# Patient Record
Sex: MALE | Race: WHITE | NOT HISPANIC OR LATINO | Employment: OTHER | URBAN - METROPOLITAN AREA
[De-identification: names, ages, dates, MRNs, and addresses within clinical notes are randomized per-mention and may not be internally consistent; named-entity substitution may affect disease eponyms.]

---

## 2023-02-20 LAB
ALANINE AMINOTRANSFERASE (SGPT) (U/L) IN SER/PLAS: 19 U/L (ref 10–52)
ALBUMIN (G/DL) IN SER/PLAS: 4.2 G/DL (ref 3.4–5)
ALKALINE PHOSPHATASE (U/L) IN SER/PLAS: 92 U/L (ref 33–136)
ANION GAP IN SER/PLAS: 14 MMOL/L (ref 10–20)
ASPARTATE AMINOTRANSFERASE (SGOT) (U/L) IN SER/PLAS: 25 U/L (ref 9–39)
BILIRUBIN TOTAL (MG/DL) IN SER/PLAS: 1.3 MG/DL (ref 0–1.2)
CALCIUM (MG/DL) IN SER/PLAS: 10.4 MG/DL (ref 8.6–10.6)
CARBON DIOXIDE, TOTAL (MMOL/L) IN SER/PLAS: 25 MMOL/L (ref 21–32)
CHLORIDE (MMOL/L) IN SER/PLAS: 110 MMOL/L (ref 98–107)
CREATININE (MG/DL) IN SER/PLAS: 1.31 MG/DL (ref 0.5–1.3)
GFR MALE: 54 ML/MIN/1.73M2
GLUCOSE (MG/DL) IN SER/PLAS: 88 MG/DL (ref 74–99)
POTASSIUM (MMOL/L) IN SER/PLAS: 5.3 MMOL/L (ref 3.5–5.3)
PROTEIN TOTAL: 6.7 G/DL (ref 6.4–8.2)
SODIUM (MMOL/L) IN SER/PLAS: 144 MMOL/L (ref 136–145)
UREA NITROGEN (MG/DL) IN SER/PLAS: 39 MG/DL (ref 6–23)

## 2023-09-04 PROBLEM — M81.0 OSTEOPOROSIS: Status: ACTIVE | Noted: 2023-09-04

## 2023-09-04 PROBLEM — K86.89: Status: ACTIVE | Noted: 2023-09-04

## 2023-09-04 PROBLEM — E78.5 DYSLIPIDEMIA: Status: ACTIVE | Noted: 2023-09-04

## 2023-09-04 PROBLEM — R35.0 BENIGN PROSTATIC HYPERPLASIA WITH URINARY FREQUENCY: Status: ACTIVE | Noted: 2023-09-04

## 2023-09-04 PROBLEM — R60.0 BILATERAL LEG EDEMA: Status: ACTIVE | Noted: 2023-09-04

## 2023-09-04 PROBLEM — I35.0 NONRHEUMATIC AORTIC VALVE STENOSIS: Status: ACTIVE | Noted: 2023-09-04

## 2023-09-04 PROBLEM — I49.3 PVC (PREMATURE VENTRICULAR CONTRACTION): Status: ACTIVE | Noted: 2023-09-04

## 2023-09-04 PROBLEM — K83.1 BILE DUCT STRICTURE (CMS-HCC): Status: ACTIVE | Noted: 2023-09-04

## 2023-09-04 PROBLEM — M65.30 TRIGGER FINGER, RIGHT: Status: ACTIVE | Noted: 2023-09-04

## 2023-09-04 PROBLEM — D64.9 ANEMIA: Status: ACTIVE | Noted: 2023-09-04

## 2023-09-04 PROBLEM — N40.0 BPH (BENIGN PROSTATIC HYPERPLASIA): Status: ACTIVE | Noted: 2023-09-04

## 2023-09-04 PROBLEM — I49.8 SINUS ARRHYTHMIA: Status: ACTIVE | Noted: 2023-09-04

## 2023-09-04 PROBLEM — R63.4 WEIGHT LOSS: Status: ACTIVE | Noted: 2023-09-04

## 2023-09-04 PROBLEM — D53.1 MEGALOBLASTIC ANEMIA: Status: ACTIVE | Noted: 2023-09-04

## 2023-09-04 PROBLEM — L98.9 SKIN LESION: Status: ACTIVE | Noted: 2023-09-04

## 2023-09-04 PROBLEM — F32.A DEPRESSION: Status: ACTIVE | Noted: 2023-09-04

## 2023-09-04 PROBLEM — N18.2 CHRONIC RENAL IMPAIRMENT, STAGE 2 (MILD): Status: ACTIVE | Noted: 2023-09-04

## 2023-09-04 PROBLEM — Z87.19 HISTORY OF GI BLEED: Status: ACTIVE | Noted: 2023-09-04

## 2023-09-04 PROBLEM — M70.61 GREATER TROCHANTERIC BURSITIS, RIGHT: Status: ACTIVE | Noted: 2023-09-04

## 2023-09-04 PROBLEM — K83.9 BILE LEAK: Status: ACTIVE | Noted: 2023-09-04

## 2023-09-04 PROBLEM — K80.50 CALCULUS OF COMMON BILE DUCT WITH ACUTE PANCREATITIS (HHS-HCC): Status: ACTIVE | Noted: 2023-09-04

## 2023-09-04 PROBLEM — I35.8 AORTIC VALVE SCLEROSIS: Status: ACTIVE | Noted: 2023-09-04

## 2023-09-04 PROBLEM — R97.20 ABNORMAL PROSTATE SPECIFIC ANTIGEN (PSA): Status: ACTIVE | Noted: 2023-09-04

## 2023-09-04 PROBLEM — M41.9 KYPHOSCOLIOSIS: Status: ACTIVE | Noted: 2023-09-04

## 2023-09-04 PROBLEM — K85.90 CALCULUS OF COMMON BILE DUCT WITH ACUTE PANCREATITIS (HHS-HCC): Status: ACTIVE | Noted: 2023-09-04

## 2023-09-04 PROBLEM — I10 BENIGN ESSENTIAL HYPERTENSION: Status: ACTIVE | Noted: 2023-09-04

## 2023-09-04 PROBLEM — E03.9 HYPOTHYROIDISM: Status: ACTIVE | Noted: 2023-09-04

## 2023-09-04 PROBLEM — L02.11 ABSCESS OF SKIN OF NECK: Status: ACTIVE | Noted: 2023-09-04

## 2023-09-04 PROBLEM — N40.1 BENIGN PROSTATIC HYPERPLASIA WITH URINARY FREQUENCY: Status: ACTIVE | Noted: 2023-09-04

## 2023-09-04 RX ORDER — DESONIDE 0.5 MG/G
CREAM TOPICAL
COMMUNITY
Start: 2013-09-30

## 2023-09-04 RX ORDER — AZILSARTAN KAMEDOXOMIL 40 MG/1
1 TABLET ORAL DAILY
COMMUNITY
Start: 2017-05-09 | End: 2023-11-28

## 2023-09-04 RX ORDER — LEVOTHYROXINE SODIUM 88 UG/1
1 TABLET ORAL DAILY
COMMUNITY
Start: 2013-12-02 | End: 2023-10-26

## 2023-09-04 RX ORDER — METOPROLOL SUCCINATE 50 MG/1
1 TABLET, EXTENDED RELEASE ORAL DAILY
COMMUNITY
Start: 2020-01-17 | End: 2023-10-26

## 2023-09-04 RX ORDER — TAMSULOSIN HYDROCHLORIDE 0.4 MG/1
1 CAPSULE ORAL DAILY
COMMUNITY
Start: 2021-07-21 | End: 2023-11-22

## 2023-09-04 RX ORDER — PANCRELIPASE 36000; 180000; 114000 [USP'U]/1; [USP'U]/1; [USP'U]/1
1 CAPSULE, DELAYED RELEASE PELLETS ORAL 3 TIMES DAILY
COMMUNITY
Start: 2017-02-09

## 2023-09-04 RX ORDER — HYDROCHLOROTHIAZIDE 12.5 MG/1
12.5 TABLET ORAL DAILY
COMMUNITY

## 2023-09-04 RX ORDER — ATORVASTATIN CALCIUM 10 MG/1
1 TABLET, FILM COATED ORAL DAILY
COMMUNITY
Start: 2013-12-13 | End: 2023-10-26

## 2023-09-04 RX ORDER — PANCRELIPASE LIPASE, PANCRELIPASE PROTEASE, PANCRELIPASE AMYLASE 40000; 126000; 168000 [USP'U]/1; [USP'U]/1; [USP'U]/1
1 CAPSULE, DELAYED RELEASE ORAL 3 TIMES DAILY
COMMUNITY
Start: 2017-07-22

## 2023-09-04 RX ORDER — DENOSUMAB 60 MG/ML
60 INJECTION SUBCUTANEOUS
COMMUNITY
Start: 2019-07-10

## 2023-09-04 RX ORDER — CHLORHEXIDINE GLUCONATE ORAL RINSE 1.2 MG/ML
SOLUTION DENTAL
COMMUNITY
Start: 2021-05-11

## 2023-09-04 RX ORDER — FLUOROURACIL 50 MG/G
CREAM TOPICAL
COMMUNITY
Start: 2013-09-13

## 2023-09-11 PROBLEM — H31.003 CHORIORETINAL SCAR OF BOTH EYES: Status: ACTIVE | Noted: 2020-09-17

## 2023-09-11 PROBLEM — I10 ESSENTIAL HYPERTENSION: Status: ACTIVE | Noted: 2017-08-23

## 2023-09-11 PROBLEM — H52.4 PRESBYOPIA: Status: ACTIVE | Noted: 2020-03-17

## 2023-09-11 PROBLEM — K86.89 PANCREATIC INSUFFICIENCY (HHS-HCC): Status: ACTIVE | Noted: 2017-08-23

## 2023-09-11 PROBLEM — Z96.1 S/P LENS IMPLANT: Status: ACTIVE | Noted: 2019-01-15

## 2023-09-11 PROBLEM — H25.12 NUCLEAR SCLEROTIC CATARACT, LEFT: Status: ACTIVE | Noted: 2019-03-14

## 2023-09-11 PROBLEM — H35.3132 INTERMEDIATE STAGE NONEXUDATIVE AGE-RELATED MACULAR DEGENERATION OF BOTH EYES: Status: ACTIVE | Noted: 2019-01-15

## 2023-09-11 PROBLEM — E78.5 HYPERLIPIDEMIA: Status: ACTIVE | Noted: 2017-08-23

## 2023-09-14 LAB
ALANINE AMINOTRANSFERASE (SGPT) (U/L) IN SER/PLAS: 23 U/L (ref 10–52)
ALBUMIN (G/DL) IN SER/PLAS: 4 G/DL (ref 3.4–5)
ALKALINE PHOSPHATASE (U/L) IN SER/PLAS: 91 U/L (ref 33–136)
ANION GAP IN SER/PLAS: 12 MMOL/L (ref 10–20)
ASPARTATE AMINOTRANSFERASE (SGOT) (U/L) IN SER/PLAS: 23 U/L (ref 9–39)
BILIRUBIN TOTAL (MG/DL) IN SER/PLAS: 1.1 MG/DL (ref 0–1.2)
CALCIUM (MG/DL) IN SER/PLAS: 10.1 MG/DL (ref 8.6–10.6)
CARBON DIOXIDE, TOTAL (MMOL/L) IN SER/PLAS: 28 MMOL/L (ref 21–32)
CHLORIDE (MMOL/L) IN SER/PLAS: 108 MMOL/L (ref 98–107)
CREATININE (MG/DL) IN SER/PLAS: 1.06 MG/DL (ref 0.5–1.3)
GFR MALE: 69 ML/MIN/1.73M2
GLUCOSE (MG/DL) IN SER/PLAS: 65 MG/DL (ref 74–99)
POTASSIUM (MMOL/L) IN SER/PLAS: 4.4 MMOL/L (ref 3.5–5.3)
PROTEIN TOTAL: 6.9 G/DL (ref 6.4–8.2)
SODIUM (MMOL/L) IN SER/PLAS: 144 MMOL/L (ref 136–145)
UREA NITROGEN (MG/DL) IN SER/PLAS: 34 MG/DL (ref 6–23)

## 2023-10-03 ENCOUNTER — TELEPHONE (OUTPATIENT)
Dept: PRIMARY CARE | Facility: CLINIC | Age: 83
End: 2023-10-03
Payer: MEDICARE

## 2023-10-06 NOTE — TELEPHONE ENCOUNTER
Spoke with pt. Let him know that the RSV vaccine is up to him, the doctor is not for or against the vaccine at this time.

## 2023-10-10 ENCOUNTER — OFFICE VISIT (OUTPATIENT)
Dept: RHEUMATOLOGY | Facility: CLINIC | Age: 83
End: 2023-10-10
Payer: MEDICARE

## 2023-10-10 VITALS
BODY MASS INDEX: 16.49 KG/M2 | DIASTOLIC BLOOD PRESSURE: 62 MMHG | HEART RATE: 63 BPM | TEMPERATURE: 96.8 F | WEIGHT: 115.2 LBS | HEIGHT: 70 IN | SYSTOLIC BLOOD PRESSURE: 131 MMHG

## 2023-10-10 DIAGNOSIS — M81.0 OSTEOPOROSIS WITHOUT CURRENT PATHOLOGICAL FRACTURE, UNSPECIFIED OSTEOPOROSIS TYPE: Primary | ICD-10-CM

## 2023-10-10 PROCEDURE — 99213 OFFICE O/P EST LOW 20 MIN: CPT | Performed by: INTERNAL MEDICINE

## 2023-10-10 PROCEDURE — 1159F MED LIST DOCD IN RCRD: CPT | Performed by: INTERNAL MEDICINE

## 2023-10-10 PROCEDURE — 1126F AMNT PAIN NOTED NONE PRSNT: CPT | Performed by: INTERNAL MEDICINE

## 2023-10-10 PROCEDURE — 3075F SYST BP GE 130 - 139MM HG: CPT | Performed by: INTERNAL MEDICINE

## 2023-10-10 PROCEDURE — 99213 OFFICE O/P EST LOW 20 MIN: CPT | Mod: PO | Performed by: INTERNAL MEDICINE

## 2023-10-10 PROCEDURE — 3078F DIAST BP <80 MM HG: CPT | Performed by: INTERNAL MEDICINE

## 2023-10-10 ASSESSMENT — PAIN SCALES - GENERAL: PAINLEVEL: 0-NO PAIN

## 2023-10-10 NOTE — PROGRESS NOTES
YAMILETH Franks is a 83 y.o. male  history of hypothyroidism, pancreatic insufficiency, macrocytic anemia, BPH, hypertension, osteopenia on Prolia injections every 6 months who presents for osteoporosis follow up     He has not fallen. He has not had any recent fractures. He continues to take Prolia injections every 6 months. He has been taking calcium and vitamin D supplements.     He has some mild bursitis and lateral aspect of the right hip but otherwise has been feeling fine. No joint pain or swelling. No morning stiffness.     Exercises three times a week, aerobics and treadmill and a little bit with the weight machines.     He has not been losing any more weight since taking Creon for pancreatic insufficiency but hasn't gained any either.    Was hospitalized in 06/2023 for acute pancreatitis with dilated pancreatic ducts. ERCP was performed and he has a biliary drain. No fever, chills, weight loss. No nausea, vomiting, abdominal pain, constipation, diarrhea, melena or hematochezia    Patient Active Problem List   Diagnosis    Abnormal prostate specific antigen (PSA)    Abscess of skin of neck    Anemia    Aortic valve sclerosis    Atrophy, pancreas    Benign essential hypertension    BPH (benign prostatic hyperplasia)    Depression    Osteoporosis    Dyslipidemia    Benign prostatic hyperplasia with urinary frequency    Greater trochanteric bursitis, right    Hypothyroidism    Kyphoscoliosis    Megaloblastic anemia    Nonrheumatic aortic valve stenosis    PVC (premature ventricular contraction)    Sinus arrhythmia    Skin lesion    Trigger finger, right    Weight loss    Bilateral leg edema    Bile duct stricture    Bile leak    Calculus of common bile duct with acute pancreatitis    Chronic renal impairment, stage 2 (mild)    History of GI bleed    Chorioretinal scar of both eyes    Conjunctival cyst of left eye    Conjunctival hemorrhage of left eye    Hyperlipidemia    Intermediate stage nonexudative  age-related macular degeneration of both eyes    Nuclear sclerotic cataract, left    Posterior subcapsular polar senile cataract    Presbyopia    S/P lens implant    Pancreatic insufficiency    Essential hypertension    Retinal vascular occlusion       Past Medical History:   Diagnosis Date    Epigastric pain 12/28/2016    Abdominal pain, epigastric    Gastrojejunal ulcer, unspecified as acute or chronic, without hemorrhage or perforation     Jejunal ulcer    Gilbert syndrome     Gilbert syndrome    Iron deficiency anemia secondary to blood loss (chronic)     Iron deficiency anemia due to chronic blood loss    Iron deficiency anemia secondary to blood loss (chronic) 12/27/2016    Anemia due to blood loss    Other long term (current) drug therapy 06/17/2016    High risk medication use    Personal history of (healed) traumatic fracture     History of fracture of left hip    Tinnitus, right ear 06/17/2016    Tinnitus of right ear       Past Surgical History:   Procedure Laterality Date    CHOLECYSTECTOMY  04/15/2014    Cholecystectomy    COLONOSCOPY  06/09/2015    Complete Colonoscopy    OTHER SURGICAL HISTORY  04/15/2014    Partial Gastrectomy [place] II    US GUIDED ABSCESS DRAIN  7/6/2023    US GUIDED ABSCESS DRAIN 7/6/2023 [facility]       Social History     Socioeconomic History    Marital status: Single     Spouse name: Not on file    Number of children: Not on file    Years of education: Not on file    Highest education level: Not on file   Occupational History    Not on file   Tobacco Use    Smoking status: Not on file    Smokeless tobacco: Not on file   Substance and Sexual Activity    Alcohol use: Not on file    Drug use: Not on file    Sexual activity: Not on file   Other Topics Concern    Not on file   Social History Narrative    Not on file     Social Determinants of Health     Financial Resource Strain: Not on file   Food Insecurity: Not on file   Transportation Needs: Not on file   Physical Activity: Not on  file   Stress: Not on file   Social Connections: Not on file   Intimate Partner Violence: Not on file   Housing Stability: Not on file       No Known Allergies      Current Outpatient Medications:     atorvastatin (Lipitor) 10 mg tablet, Take 1 tablet (10 mg) by mouth once daily., Disp: , Rfl:     calcium citrate/vitamin D3 (CITRACAL REGULAR ORAL), Take by mouth., Disp: , Rfl:     chlorhexidine (Peridex) 0.12 % solution, Use in the mouth or throat. AFTER BREAKFAST AND BEFORE BEDTIME FOR 1 WEEK, Disp: , Rfl:     denosumab (Prolia) 60 mg/mL syringe, Inject 1 mL (60 mg) under the skin every 6 months., Disp: , Rfl:     desonide (DesOwen) 0.05 % cream, Desonide 0.05 % External Cream  Quantity: 60  Refills: 0      Start : 30-Sep-2013  Active, Disp: , Rfl:     Edarbi 40 mg tablet, Take 1 tablet by mouth once daily., Disp: , Rfl:     fluorouracil (Efudex) 5 % cream, Fluorouracil 5 % External Cream  Quantity: 40  Refills: 0      Start : 13-Sep-2013  Active, Disp: , Rfl:     hydroCHLOROthiazide (HYDRODiuril) 12.5 mg tablet, Take 1 tablet (12.5 mg) by mouth once daily., Disp: , Rfl:     levothyroxine (Synthroid, Levoxyl) 88 mcg tablet, Take 1 tablet (88 mcg) by mouth once daily., Disp: , Rfl:     lipase-protease-amylase (Zenpep) 40,000-126,000- 168,000 unit capsule, Take 1 capsule by mouth 3 times a day., Disp: , Rfl:     metoprolol succinate XL (Toprol-XL) 50 mg 24 hr tablet, Take 1 tablet (50 mg) by mouth once daily., Disp: , Rfl:     pancrelipase, Lip-Prot-Amyl, (Creon) 36,000-114,000- 180,000 unit capsule,delayed release(DR/EC) capsule, Take 1 capsule by mouth 3 times a day., Disp: , Rfl:     tamsulosin (Flomax) 0.4 mg 24 hr capsule, Take 1 capsule (0.4 mg) by mouth once daily., Disp: , Rfl:     Objective     Visit Vitals  /62 (BP Location: Left arm, Patient Position: Sitting)   Pulse 63   Temp 36 °C (96.8 °F)     Physical Exam  He has a very thin body habitus. The abdomen benign. Extremities without edema.  Neurologic examination shows a mild apraxia on tandem gait. The Romberg test is normal. The musculoskeletal lamination does not show any joint effusions.     Lab Results   Component Value Date    WBC CANCELED 07/09/2023    HGB CANCELED 07/09/2023    HCT CANCELED 07/09/2023    MCV CANCELED 07/09/2023    PLT CANCELED 07/09/2023       Chemistry    Lab Results   Component Value Date/Time     09/14/2023 1518    K 4.4 09/14/2023 1518     (H) 09/14/2023 1518    CO2 28 09/14/2023 1518    BUN 34 (H) 09/14/2023 1518    CREATININE 1.06 09/14/2023 1518    Lab Results   Component Value Date/Time    CALCIUM 10.1 09/14/2023 1518    ALKPHOS 91 09/14/2023 1518    AST 23 09/14/2023 1518    ALT 23 09/14/2023 1518    BILITOT 1.1 09/14/2023 1518          Lab Results   Component Value Date    CALCIUM 10.1 09/14/2023    PHOS 3.2 02/14/2019     Alkaline Phosphatase   Date Value Ref Range Status   09/14/2023 91 33 - 136 U/L Final     AST   Date Value Ref Range Status   09/14/2023 23 9 - 39 U/L Final     Urea Nitrogen   Date Value Ref Range Status   09/14/2023 34 (H) 6 - 23 mg/dL Final     Sodium   Date Value Ref Range Status   09/14/2023 144 136 - 145 mmol/L Final     Potassium   Date Value Ref Range Status   09/14/2023 4.4 3.5 - 5.3 mmol/L Final     Bicarbonate   Date Value Ref Range Status   09/14/2023 28 21 - 32 mmol/L Final     ALT (SGPT)   Date Value Ref Range Status   09/14/2023 23 10 - 52 U/L Final     Comment:      Patients treated with Sulfasalazine may generate    falsely decreased results for ALT.       Vitamin D, 25-Hydroxy   Date Value Ref Range Status   06/26/2019 43 ng/mL Final     Comment:     .  DEFICIENCY:         < 20  NG/ML  INSUFFICIENCY:      20-29 NG/ML  OPTIMUM LEVEL:      30-80 NG/ML  POSSIBLE TOXICITY:  > 80  NG/ML    THIS ASSAY ACCURATELY QUANTIFIES THE SUM OF  VITAMIN D3, 25-HYDROXY AND VIT D2,25-HYDROXY.           Assessment/Plan   An 83 year old M here for osteopenia with high frax follow up.      DEXA bone density T score....... (3/11/2022)...... (6/6/2019)  L1-L4.............................................. -1.2.................. -1.5  Right femoral neck......................... -2.2.................. -2.3  Right total femur............................. -2.0.................. -2.3    - Labs from 09/2023 reviewed.    - He is to continue with Prolia injections every 6 months (scheduled for 03/2024). He is to do weightbearing exercises and take calcium and vitamin D supplements.   - CMP ordered for 03/2023.   - He already has an order for DEXA for 03/2023, he will call to make an appointment.   - RTC at next available appointment     Aurea Ramon MD  Rheumatology Fellow

## 2023-10-11 ENCOUNTER — TELEPHONE (OUTPATIENT)
Dept: PRIMARY CARE | Facility: CLINIC | Age: 83
End: 2023-10-11

## 2023-10-12 ENCOUNTER — PHARMACY VISIT (OUTPATIENT)
Dept: PHARMACY | Facility: CLINIC | Age: 83
End: 2023-10-12

## 2023-10-12 PROCEDURE — RXMED WILLOW AMBULATORY MEDICATION CHARGE

## 2023-10-12 RX ORDER — SODIUM CHLORIDE 0.9 % (FLUSH) 0.9 %
SYRINGE (ML) INJECTION
Qty: 600 ML | Refills: 1 | OUTPATIENT
Start: 2023-10-12 | End: 2024-03-08 | Stop reason: ALTCHOICE

## 2023-10-12 RX ORDER — SODIUM CHLORIDE 9 MG/ML
INJECTION, SOLUTION INTRAMUSCULAR; INTRAVENOUS; SUBCUTANEOUS
Qty: 600 ML | Refills: 1 | OUTPATIENT
Start: 2023-10-12

## 2023-10-13 ENCOUNTER — TELEPHONE (OUTPATIENT)
Dept: PRIMARY CARE | Facility: CLINIC | Age: 83
End: 2023-10-13
Payer: MEDICARE

## 2023-10-18 ENCOUNTER — TELEMEDICINE (OUTPATIENT)
Dept: GASTROENTEROLOGY | Facility: HOSPITAL | Age: 83
End: 2023-10-18
Payer: MEDICARE

## 2023-10-18 DIAGNOSIS — K83.1 BILE DUCT STRICTURE (CMS-HCC): Primary | ICD-10-CM

## 2023-10-18 PROCEDURE — 99214 OFFICE O/P EST MOD 30 MIN: CPT | Performed by: INTERNAL MEDICINE

## 2023-10-18 PROCEDURE — 99214 OFFICE O/P EST MOD 30 MIN: CPT | Mod: 95 | Performed by: INTERNAL MEDICINE

## 2023-10-18 NOTE — PROGRESS NOTES
"Department of Gastroenterology and Hepatology   Gastroenterology Clinic Progress Note     Subjective  83 year old with pertinent PMH of PUD s/p surgery in 1984, cholecystectomy, jaundice in 1989 s/p \"another surgery,\" chronic pancreatitis with biliary stricture and pancreatic insufficiency on pancreatic enzymes who was recently admitted to [facility] beginning of July this year for acute pancreatitis with abdominal pain and elevated lipase > 2000, elevated wbc, and slightly elevated bili. All resolved spontaneously. Since then he is doing well.    At [facility] we first tried ERCP. He had B2 anatomy but failed bile duct cannulation. This was followed by PTHC and wire. Repeat ERCP failed to see wire in ampulla. F/U PTHC shows that he had choledochojej. He still has PTHC drain.       LABS:  Lab Results   Component Value Date    WBC CANCELED 07/09/2023    WBC 10.8 07/08/2023    WBC 11.6 (H) 07/07/2023    WBC 9.6 07/06/2023    WBC 7.9 07/04/2023    HGB CANCELED 07/09/2023    HGB 9.4 (L) 07/08/2023    HGB 9.6 (L) 07/07/2023    HGB 9.3 (L) 07/06/2023    HGB 9.5 (L) 07/04/2023    HCT CANCELED 07/09/2023    HCT 28.9 (L) 07/08/2023    HCT 29.8 (L) 07/07/2023    HCT 28.5 (L) 07/06/2023    HCT 29.9 (L) 07/04/2023    MCV CANCELED 07/09/2023    MCV 94 07/08/2023    MCV 95 07/07/2023    MCV 94 07/06/2023    MCV 96 07/04/2023    PLT CANCELED 07/09/2023     07/08/2023     07/07/2023     07/06/2023     07/04/2023      Lab Results   Component Value Date    CREATININE 1.06 09/14/2023    CREATININE CANCELED 07/09/2023    CREATININE 1.58 (H) 07/08/2023    BUN 34 (H) 09/14/2023    BUN CANCELED 07/09/2023    BUN 23 07/08/2023     09/14/2023    NA CANCELED 07/09/2023     07/08/2023    K 4.4 09/14/2023    K CANCELED 07/09/2023    K 3.3 (L) 07/08/2023     (H) 09/14/2023    CL CANCELED 07/09/2023     (H) 07/08/2023    CO2 28 09/14/2023    CO2 CANCELED 07/09/2023    CO2 22 07/08/2023      Lab Results "   Component Value Date    INR 1.1 07/05/2023    PROTIME 12.4 07/05/2023      Lab Results   Component Value Date    ALT 23 09/14/2023    ALT CANCELED 07/09/2023    ALT 20 07/08/2023    AST 23 09/14/2023    AST CANCELED 07/09/2023    AST 22 07/08/2023    ALKPHOS 91 09/14/2023    ALKPHOS CANCELED 07/09/2023    ALKPHOS 56 07/08/2023    BILITOT 1.1 09/14/2023    BILITOT CANCELED 07/09/2023    BILITOT 0.6 07/08/2023       A&P:   Complicated history. Suspect he had B2 in '84 followed by [facility] choledochojej. Current [facility] is in choledochojej. However, his episode in [place] must have been GS pancreatitis through native bile duct and papolla. Ideally would do sphincterotomy but don't know if that is possible.     Repeat ERCP     Staffed with Dr. Gaudencio Souza MD

## 2023-11-22 DIAGNOSIS — R35.0 FREQUENCY OF MICTURITION: ICD-10-CM

## 2023-11-22 DIAGNOSIS — N40.1 BENIGN PROSTATIC HYPERPLASIA WITH LOWER URINARY TRACT SYMPTOMS: ICD-10-CM

## 2023-11-22 RX ORDER — TAMSULOSIN HYDROCHLORIDE 0.4 MG/1
0.4 CAPSULE ORAL DAILY
Qty: 90 CAPSULE | Refills: 0 | Status: SHIPPED | OUTPATIENT
Start: 2023-11-22 | End: 2024-02-23

## 2023-11-27 DIAGNOSIS — I10 BENIGN ESSENTIAL HYPERTENSION: ICD-10-CM

## 2023-11-28 RX ORDER — AZILSARTAN KAMEDOXOMIL 40 MG/1
1 TABLET ORAL DAILY
Qty: 90 TABLET | Refills: 0 | Status: SHIPPED | OUTPATIENT
Start: 2023-11-28 | End: 2024-01-30

## 2023-11-30 ENCOUNTER — HOSPITAL ENCOUNTER (OUTPATIENT)
Dept: GASTROENTEROLOGY | Facility: HOSPITAL | Age: 83
Setting detail: OUTPATIENT SURGERY
Discharge: HOME | End: 2023-11-30
Payer: MEDICARE

## 2023-11-30 ENCOUNTER — ANESTHESIA EVENT (OUTPATIENT)
Dept: GASTROENTEROLOGY | Facility: HOSPITAL | Age: 83
End: 2023-11-30
Payer: MEDICARE

## 2023-11-30 ENCOUNTER — ANESTHESIA (OUTPATIENT)
Dept: GASTROENTEROLOGY | Facility: HOSPITAL | Age: 83
End: 2023-11-30
Payer: MEDICARE

## 2023-11-30 VITALS
TEMPERATURE: 96.4 F | HEIGHT: 70 IN | BODY MASS INDEX: 17.18 KG/M2 | WEIGHT: 120 LBS | OXYGEN SATURATION: 96 % | HEART RATE: 78 BPM | RESPIRATION RATE: 22 BRPM | SYSTOLIC BLOOD PRESSURE: 136 MMHG | DIASTOLIC BLOOD PRESSURE: 75 MMHG

## 2023-11-30 DIAGNOSIS — K83.1 BILE DUCT STRICTURE (CMS-HCC): ICD-10-CM

## 2023-11-30 PROCEDURE — 7100000009 HC PHASE TWO TIME - INITIAL BASE CHARGE: Performed by: INTERNAL MEDICINE

## 2023-11-30 PROCEDURE — A43264 PR ERCP REMOVE CALCULI/DEBRIS BILIARY/PANCREAS DUCT

## 2023-11-30 PROCEDURE — 2720000007 HC OR 272 NO HCPCS: Performed by: INTERNAL MEDICINE

## 2023-11-30 PROCEDURE — 3700000002 HC GENERAL ANESTHESIA TIME - EACH INCREMENTAL 1 MINUTE: Performed by: INTERNAL MEDICINE

## 2023-11-30 PROCEDURE — 7100000002 HC RECOVERY ROOM TIME - EACH INCREMENTAL 1 MINUTE: Performed by: INTERNAL MEDICINE

## 2023-11-30 PROCEDURE — 3700000001 HC GENERAL ANESTHESIA TIME - INITIAL BASE CHARGE: Performed by: INTERNAL MEDICINE

## 2023-11-30 PROCEDURE — 7100000010 HC PHASE TWO TIME - EACH INCREMENTAL 1 MINUTE: Performed by: INTERNAL MEDICINE

## 2023-11-30 PROCEDURE — C1769 GUIDE WIRE: HCPCS | Performed by: INTERNAL MEDICINE

## 2023-11-30 PROCEDURE — 7100000001 HC RECOVERY ROOM TIME - INITIAL BASE CHARGE: Performed by: INTERNAL MEDICINE

## 2023-11-30 PROCEDURE — 2500000005 HC RX 250 GENERAL PHARMACY W/O HCPCS

## 2023-11-30 PROCEDURE — 99100 ANES PT EXTEME AGE<1 YR&>70: CPT | Performed by: STUDENT IN AN ORGANIZED HEALTH CARE EDUCATION/TRAINING PROGRAM

## 2023-11-30 PROCEDURE — 43264 ERCP REMOVE DUCT CALCULI: CPT | Performed by: INTERNAL MEDICINE

## 2023-11-30 PROCEDURE — 2500000004 HC RX 250 GENERAL PHARMACY W/ HCPCS (ALT 636 FOR OP/ED)

## 2023-11-30 PROCEDURE — A43264 PR ERCP REMOVE CALCULI/DEBRIS BILIARY/PANCREAS DUCT: Performed by: STUDENT IN AN ORGANIZED HEALTH CARE EDUCATION/TRAINING PROGRAM

## 2023-11-30 RX ORDER — ONDANSETRON HYDROCHLORIDE 2 MG/ML
4 INJECTION, SOLUTION INTRAVENOUS ONCE AS NEEDED
OUTPATIENT
Start: 2023-11-30

## 2023-11-30 RX ORDER — DEXAMETHASONE SODIUM PHOSPHATE 4 MG/ML
INJECTION, SOLUTION INTRA-ARTICULAR; INTRALESIONAL; INTRAMUSCULAR; INTRAVENOUS; SOFT TISSUE AS NEEDED
Status: DISCONTINUED | OUTPATIENT
Start: 2023-11-30 | End: 2023-11-30

## 2023-11-30 RX ORDER — ALBUTEROL SULFATE 0.83 MG/ML
2.5 SOLUTION RESPIRATORY (INHALATION) ONCE AS NEEDED
OUTPATIENT
Start: 2023-11-30

## 2023-11-30 RX ORDER — SODIUM CHLORIDE, SODIUM LACTATE, POTASSIUM CHLORIDE, CALCIUM CHLORIDE 600; 310; 30; 20 MG/100ML; MG/100ML; MG/100ML; MG/100ML
100 INJECTION, SOLUTION INTRAVENOUS CONTINUOUS
OUTPATIENT
Start: 2023-11-30

## 2023-11-30 RX ORDER — HYDROMORPHONE HYDROCHLORIDE 1 MG/ML
0.5 INJECTION, SOLUTION INTRAMUSCULAR; INTRAVENOUS; SUBCUTANEOUS EVERY 5 MIN PRN
OUTPATIENT
Start: 2023-11-30

## 2023-11-30 RX ORDER — PHENYLEPHRINE HCL IN 0.9% NACL 0.4MG/10ML
SYRINGE (ML) INTRAVENOUS AS NEEDED
Status: DISCONTINUED | OUTPATIENT
Start: 2023-11-30 | End: 2023-11-30

## 2023-11-30 RX ORDER — OXYCODONE HYDROCHLORIDE 5 MG/1
10 TABLET ORAL EVERY 4 HOURS PRN
OUTPATIENT
Start: 2023-11-30

## 2023-11-30 RX ORDER — LIDOCAINE HCL/PF 100 MG/5ML
SYRINGE (ML) INTRAVENOUS AS NEEDED
Status: DISCONTINUED | OUTPATIENT
Start: 2023-11-30 | End: 2023-11-30

## 2023-11-30 RX ORDER — OXYCODONE HYDROCHLORIDE 5 MG/1
5 TABLET ORAL EVERY 4 HOURS PRN
OUTPATIENT
Start: 2023-11-30

## 2023-11-30 RX ORDER — ROCURONIUM BROMIDE 10 MG/ML
INJECTION, SOLUTION INTRAVENOUS AS NEEDED
Status: DISCONTINUED | OUTPATIENT
Start: 2023-11-30 | End: 2023-11-30

## 2023-11-30 RX ORDER — ONDANSETRON HYDROCHLORIDE 2 MG/ML
INJECTION, SOLUTION INTRAVENOUS AS NEEDED
Status: DISCONTINUED | OUTPATIENT
Start: 2023-11-30 | End: 2023-11-30

## 2023-11-30 RX ORDER — HYDROMORPHONE HYDROCHLORIDE 1 MG/ML
0.2 INJECTION, SOLUTION INTRAMUSCULAR; INTRAVENOUS; SUBCUTANEOUS EVERY 5 MIN PRN
OUTPATIENT
Start: 2023-11-30

## 2023-11-30 RX ORDER — PROPOFOL 10 MG/ML
INJECTION, EMULSION INTRAVENOUS AS NEEDED
Status: DISCONTINUED | OUTPATIENT
Start: 2023-11-30 | End: 2023-11-30

## 2023-11-30 RX ORDER — ESMOLOL HYDROCHLORIDE 10 MG/ML
INJECTION INTRAVENOUS AS NEEDED
Status: DISCONTINUED | OUTPATIENT
Start: 2023-11-30 | End: 2023-11-30

## 2023-11-30 RX ORDER — ACETAMINOPHEN 325 MG/1
650 TABLET ORAL EVERY 4 HOURS PRN
OUTPATIENT
Start: 2023-11-30

## 2023-11-30 RX ADMIN — LIDOCAINE HYDROCHLORIDE 90 MG: 20 INJECTION INTRAVENOUS at 11:56

## 2023-11-30 RX ADMIN — ROCURONIUM BROMIDE 30 MG: 50 INJECTION, SOLUTION INTRAVENOUS at 11:57

## 2023-11-30 RX ADMIN — Medication 120 MCG: at 11:56

## 2023-11-30 RX ADMIN — DEXAMETHASONE SODIUM PHOSPHATE 4 MG: 4 INJECTION, SOLUTION INTRA-ARTICULAR; INTRALESIONAL; INTRAMUSCULAR; INTRAVENOUS; SOFT TISSUE at 12:03

## 2023-11-30 RX ADMIN — SUGAMMADEX 200 MG: 100 INJECTION, SOLUTION INTRAVENOUS at 12:46

## 2023-11-30 RX ADMIN — Medication 120 MCG: at 12:28

## 2023-11-30 RX ADMIN — Medication 120 MCG: at 12:06

## 2023-11-30 RX ADMIN — PROPOFOL 30 MG: 10 INJECTION, EMULSION INTRAVENOUS at 12:10

## 2023-11-30 RX ADMIN — PROPOFOL 150 MG: 10 INJECTION, EMULSION INTRAVENOUS at 11:56

## 2023-11-30 RX ADMIN — SODIUM CHLORIDE, SODIUM LACTATE, POTASSIUM CHLORIDE, AND CALCIUM CHLORIDE: 600; 310; 30; 20 INJECTION, SOLUTION INTRAVENOUS at 11:56

## 2023-11-30 RX ADMIN — ESMOLOL HYDROCHLORIDE 30 MG: 100 INJECTION, SOLUTION INTRAVENOUS at 12:27

## 2023-11-30 RX ADMIN — ONDANSETRON 4 MG: 2 INJECTION INTRAMUSCULAR; INTRAVENOUS at 12:03

## 2023-11-30 SDOH — HEALTH STABILITY: MENTAL HEALTH: CURRENT SMOKER: 1

## 2023-11-30 ASSESSMENT — PAIN - FUNCTIONAL ASSESSMENT
PAIN_FUNCTIONAL_ASSESSMENT: 0-10

## 2023-11-30 ASSESSMENT — PAIN SCALES - GENERAL
PAINLEVEL_OUTOF10: 0 - NO PAIN
PAIN_LEVEL: 0
PAINLEVEL_OUTOF10: 0 - NO PAIN

## 2023-11-30 ASSESSMENT — COLUMBIA-SUICIDE SEVERITY RATING SCALE - C-SSRS
2. HAVE YOU ACTUALLY HAD ANY THOUGHTS OF KILLING YOURSELF?: NO
1. IN THE PAST MONTH, HAVE YOU WISHED YOU WERE DEAD OR WISHED YOU COULD GO TO SLEEP AND NOT WAKE UP?: NO
6. HAVE YOU EVER DONE ANYTHING, STARTED TO DO ANYTHING, OR PREPARED TO DO ANYTHING TO END YOUR LIFE?: NO

## 2023-11-30 NOTE — ANESTHESIA PREPROCEDURE EVALUATION
Patient: Israel Franks    Procedure Information       Date/Time: 11/30/23 1200    Scheduled providers: Earnest Ratliff MD; Baldomero Cotton DO; Jazmine Padilla RN    Procedure: ERCP    Location: [facility]            Relevant Problems   Anesthesia (within normal limits)      Cardiovascular   (+) Aortic valve sclerosis   (+) Benign essential hypertension   (+) Essential hypertension   (+) Hyperlipidemia   (+) Nonrheumatic aortic valve stenosis   (+) PVC (premature ventricular contraction)   (+) Sinus arrhythmia      Endocrine   (+) Hypothyroidism      /Renal   (+) Chronic renal impairment, stage 2 (mild)      Neuro/Psych   (+) Depression      GI/Hepatic   (+) Calculus of common bile duct with acute pancreatitis      Hematology   (+) Anemia   (+) Megaloblastic anemia      Musculoskeletal   (+) Kyphoscoliosis       Clinical information reviewed:   Tobacco  Allergies  Meds   Med Hx  Surg Hx   Fam Hx  Soc Hx        NPO Detail:  NPO/Void Status  Date of Last Liquid: 11/30/23  Time of Last Liquid: 0800  Date of Last Solid: 11/29/23  Time of Last Solid: 2000  Last Intake Type: Clear fluids         Physical Exam    Airway  Mallampati: II  Neck ROM: full     Cardiovascular - normal exam     Dental - normal exam     Pulmonary - normal exam  Breath sounds clear to auscultation     Abdominal - normal exam             Anesthesia Plan    ASA 2     general     The patient is a current smoker.    intravenous induction   Postoperative administration of opioids is intended.  Trial extubation is planned.  Anesthetic plan and risks discussed with patient.    Plan discussed with CRNA.

## 2023-11-30 NOTE — ANESTHESIA POSTPROCEDURE EVALUATION
Patient: Israel Franks    Procedure Summary       Date: 11/30/23 Room / Location: [facility]    Anesthesia Start: 1150 Anesthesia Stop: 1256    Procedure: ERCP Diagnosis: Bile duct stricture    Scheduled Providers: Earnest Ratliff MD; Baldomero Cotton DO; Jazmine Padilla RN Responsible Provider: Baldomero Cotton DO    Anesthesia Type: general ASA Status: 2            Anesthesia Type: general    Vitals Value Taken Time   /73 11/30/23 1323   Temp 35.8 °C (96.4 °F) 11/30/23 1253   Pulse 66 11/30/23 1323   Resp 16 11/30/23 1323   SpO2 97 % 11/30/23 1323       Anesthesia Post Evaluation    Patient location during evaluation: PACU  Patient participation: complete - patient participated  Level of consciousness: awake  Pain score: 0  Pain management: adequate  Multimodal analgesia pain management approach  Airway patency: patent  Two or more strategies used to mitigate risk of obstructive sleep apnea  Cardiovascular status: acceptable  Respiratory status: acceptable  Hydration status: acceptable  Postoperative Nausea and Vomiting: none        There were no known notable events for this encounter.

## 2023-11-30 NOTE — ANESTHESIA PROCEDURE NOTES
Airway  Date/Time: 11/30/2023 11:59 AM  Urgency: elective    Airway not difficult    Staffing  Performed: CRNA   Authorized by: Baldomero Cotton DO    Performed by: DALIA Weber-RASHMI  Patient location during procedure: OR    Indications and Patient Condition  Indications for airway management: anesthesia  Spontaneous Ventilation: absent  Sedation level: deep  Preoxygenated: yes  Patient position: sniffing  Mask difficulty assessment: 1 - vent by mask  Planned trial extubation    Final Airway Details  Final airway type: endotracheal airway      Successful airway: ETT  Cuffed: yes   Successful intubation technique: direct laryngoscopy  Facilitating devices/methods: intubating stylet  Endotracheal tube insertion site: oral  Blade: Basilio  Blade size: #4  ETT size (mm): 7.5  Cormack-Lehane Classification: grade I - full view of glottis  Placement verified by: capnometry   Measured from: lips  ETT to lips (cm): 22  Number of attempts at approach: 1  Number of other approaches attempted: 0

## 2023-11-30 NOTE — H&P
Subjective     History of Present Illness:   Israel Franks is a 83 y.o. male who presents to endoscopy    Physical Exam  General: not in acute distress  CV: regular rate and rhythm  Resp: non-labored breathing

## 2024-01-29 DIAGNOSIS — I10 BENIGN ESSENTIAL HYPERTENSION: ICD-10-CM

## 2024-01-30 RX ORDER — AZILSARTAN KAMEDOXOMIL 40 MG/1
1 TABLET ORAL DAILY
Qty: 90 TABLET | Refills: 3 | Status: SHIPPED | OUTPATIENT
Start: 2024-01-30

## 2024-02-23 DIAGNOSIS — N40.1 BENIGN PROSTATIC HYPERPLASIA WITH LOWER URINARY TRACT SYMPTOMS: ICD-10-CM

## 2024-02-23 DIAGNOSIS — R35.0 FREQUENCY OF MICTURITION: ICD-10-CM

## 2024-02-23 RX ORDER — TAMSULOSIN HYDROCHLORIDE 0.4 MG/1
0.4 CAPSULE ORAL DAILY
Qty: 90 CAPSULE | Refills: 0 | Status: SHIPPED | OUTPATIENT
Start: 2024-02-23 | End: 2024-05-28

## 2024-02-29 DIAGNOSIS — E03.9 ACQUIRED HYPOTHYROIDISM: ICD-10-CM

## 2024-02-29 RX ORDER — LEVOTHYROXINE SODIUM 88 UG/1
88 TABLET ORAL DAILY
Qty: 90 TABLET | Refills: 0 | Status: SHIPPED | OUTPATIENT
Start: 2024-02-29 | End: 2024-05-21

## 2024-03-04 ENCOUNTER — TELEPHONE (OUTPATIENT)
Dept: RHEUMATOLOGY | Facility: CLINIC | Age: 84
End: 2024-03-04
Payer: MEDICARE

## 2024-03-04 NOTE — TELEPHONE ENCOUNTER
Patient called and wants Prolia re-scheduled from 3/18. He wants Tuesday, Thursday, or Friday of the following week. Please call him at [phone].

## 2024-03-05 DIAGNOSIS — E78.00 HYPERCHOLESTEROLEMIA: ICD-10-CM

## 2024-03-05 DIAGNOSIS — M81.0 OSTEOPOROSIS, UNSPECIFIED OSTEOPOROSIS TYPE, UNSPECIFIED PATHOLOGICAL FRACTURE PRESENCE: Primary | ICD-10-CM

## 2024-03-05 RX ORDER — ATORVASTATIN CALCIUM 10 MG/1
10 TABLET, FILM COATED ORAL DAILY
Qty: 90 TABLET | Refills: 3 | Status: SHIPPED | OUTPATIENT
Start: 2024-03-05 | End: 2024-03-08 | Stop reason: SDUPTHER

## 2024-03-05 NOTE — TELEPHONE ENCOUNTER
Pt returned this nurse's call on direct line. This nurse and pt discussed availability for rescheduling of Prolia. Pt made this nurse aware that provider appt was already done and that he just needed injection rescheduled. After discussing with provider, pt rescheduled for a nurse visit only for injection due to fuv with provider being too far out to have attached to provider schedule. No other concerns to address. Pharmacy team made aware date change for Prolia. Pt also added to Prolia calendar. Encouraged to call office with any other concerns.

## 2024-03-08 ENCOUNTER — LAB (OUTPATIENT)
Dept: LAB | Facility: LAB | Age: 84
End: 2024-03-08
Payer: MEDICARE

## 2024-03-08 ENCOUNTER — OFFICE VISIT (OUTPATIENT)
Dept: PRIMARY CARE | Facility: CLINIC | Age: 84
End: 2024-03-08
Payer: MEDICARE

## 2024-03-08 VITALS
BODY MASS INDEX: 17.18 KG/M2 | SYSTOLIC BLOOD PRESSURE: 120 MMHG | DIASTOLIC BLOOD PRESSURE: 70 MMHG | HEIGHT: 70 IN | HEART RATE: 72 BPM | RESPIRATION RATE: 16 BRPM | WEIGHT: 120 LBS

## 2024-03-08 DIAGNOSIS — K83.1 BILE DUCT STRICTURE (CMS-HCC): ICD-10-CM

## 2024-03-08 DIAGNOSIS — K86.89 PANCREATIC INSUFFICIENCY (HHS-HCC): ICD-10-CM

## 2024-03-08 DIAGNOSIS — E03.9 ACQUIRED HYPOTHYROIDISM: ICD-10-CM

## 2024-03-08 DIAGNOSIS — R35.0 BENIGN PROSTATIC HYPERPLASIA WITH URINARY FREQUENCY: ICD-10-CM

## 2024-03-08 DIAGNOSIS — E78.00 HYPERCHOLESTEROLEMIA: ICD-10-CM

## 2024-03-08 DIAGNOSIS — R13.12 OROPHARYNGEAL DYSPHAGIA: ICD-10-CM

## 2024-03-08 DIAGNOSIS — N40.1 BENIGN PROSTATIC HYPERPLASIA WITH URINARY FREQUENCY: ICD-10-CM

## 2024-03-08 DIAGNOSIS — K86.89 PANCREATIC INSUFFICIENCY (HHS-HCC): Primary | ICD-10-CM

## 2024-03-08 DIAGNOSIS — K86.89: ICD-10-CM

## 2024-03-08 DIAGNOSIS — E78.49 OTHER HYPERLIPIDEMIA: ICD-10-CM

## 2024-03-08 DIAGNOSIS — Z23 NEED FOR VACCINATION WITH 20-POLYVALENT PNEUMOCOCCAL CONJUGATE VACCINE: ICD-10-CM

## 2024-03-08 DIAGNOSIS — I35.8 AORTIC VALVE SCLEROSIS: ICD-10-CM

## 2024-03-08 DIAGNOSIS — I10 BENIGN ESSENTIAL HYPERTENSION: ICD-10-CM

## 2024-03-08 DIAGNOSIS — D53.1 MEGALOBLASTIC ANEMIA: ICD-10-CM

## 2024-03-08 DIAGNOSIS — N18.2 CHRONIC RENAL IMPAIRMENT, STAGE 2 (MILD): ICD-10-CM

## 2024-03-08 DIAGNOSIS — E78.5 DYSLIPIDEMIA: ICD-10-CM

## 2024-03-08 DIAGNOSIS — I49.3 PVC (PREMATURE VENTRICULAR CONTRACTION): ICD-10-CM

## 2024-03-08 LAB
ALBUMIN SERPL BCP-MCNC: 4.2 G/DL (ref 3.4–5)
ALP SERPL-CCNC: 86 U/L (ref 33–136)
ALT SERPL W P-5'-P-CCNC: 24 U/L (ref 10–52)
AMYLASE SERPL-CCNC: 48 U/L (ref 29–103)
ANION GAP SERPL CALC-SCNC: 12 MMOL/L (ref 10–20)
AST SERPL W P-5'-P-CCNC: 26 U/L (ref 9–39)
BASOPHILS # BLD AUTO: 0.05 X10*3/UL (ref 0–0.1)
BASOPHILS NFR BLD AUTO: 0.6 %
BILIRUB SERPL-MCNC: 1 MG/DL (ref 0–1.2)
BUN SERPL-MCNC: 35 MG/DL (ref 6–23)
CALCIUM SERPL-MCNC: 9.8 MG/DL (ref 8.6–10.6)
CHLORIDE SERPL-SCNC: 105 MMOL/L (ref 98–107)
CHOLEST SERPL-MCNC: 162 MG/DL (ref 0–199)
CHOLESTEROL/HDL RATIO: 2.3
CO2 SERPL-SCNC: 30 MMOL/L (ref 21–32)
CREAT SERPL-MCNC: 1.22 MG/DL (ref 0.5–1.3)
EGFRCR SERPLBLD CKD-EPI 2021: 58 ML/MIN/1.73M*2
EOSINOPHIL # BLD AUTO: 0 X10*3/UL (ref 0–0.4)
EOSINOPHIL NFR BLD AUTO: 0 %
ERYTHROCYTE [DISTWIDTH] IN BLOOD BY AUTOMATED COUNT: 13.2 % (ref 11.5–14.5)
GLUCOSE SERPL-MCNC: 86 MG/DL (ref 74–99)
HCT VFR BLD AUTO: 40.2 % (ref 41–52)
HDLC SERPL-MCNC: 71.3 MG/DL
HGB BLD-MCNC: 12.7 G/DL (ref 13.5–17.5)
IMM GRANULOCYTES # BLD AUTO: 0.02 X10*3/UL (ref 0–0.5)
IMM GRANULOCYTES NFR BLD AUTO: 0.2 % (ref 0–0.9)
LDLC SERPL CALC-MCNC: 77 MG/DL
LIPASE SERPL-CCNC: 16 U/L (ref 9–82)
LYMPHOCYTES # BLD AUTO: 1.27 X10*3/UL (ref 0.8–3)
LYMPHOCYTES NFR BLD AUTO: 15.1 %
MCH RBC QN AUTO: 31.5 PG (ref 26–34)
MCHC RBC AUTO-ENTMCNC: 31.6 G/DL (ref 32–36)
MCV RBC AUTO: 100 FL (ref 80–100)
MONOCYTES # BLD AUTO: 0.7 X10*3/UL (ref 0.05–0.8)
MONOCYTES NFR BLD AUTO: 8.3 %
NEUTROPHILS # BLD AUTO: 6.37 X10*3/UL (ref 1.6–5.5)
NEUTROPHILS NFR BLD AUTO: 75.8 %
NON HDL CHOLESTEROL: 91 MG/DL (ref 0–149)
NRBC BLD-RTO: 0 /100 WBCS (ref 0–0)
PLATELET # BLD AUTO: 240 X10*3/UL (ref 150–450)
POTASSIUM SERPL-SCNC: 4.7 MMOL/L (ref 3.5–5.3)
PROT SERPL-MCNC: 7.2 G/DL (ref 6.4–8.2)
PSA SERPL-MCNC: 7.23 NG/ML
RBC # BLD AUTO: 4.03 X10*6/UL (ref 4.5–5.9)
SODIUM SERPL-SCNC: 142 MMOL/L (ref 136–145)
TRIGL SERPL-MCNC: 71 MG/DL (ref 0–149)
TSH SERPL-ACNC: 7.18 MIU/L (ref 0.44–3.98)
VLDL: 14 MG/DL (ref 0–40)
WBC # BLD AUTO: 8.4 X10*3/UL (ref 4.4–11.3)

## 2024-03-08 PROCEDURE — 1036F TOBACCO NON-USER: CPT | Performed by: INTERNAL MEDICINE

## 2024-03-08 PROCEDURE — 83690 ASSAY OF LIPASE: CPT

## 2024-03-08 PROCEDURE — 1126F AMNT PAIN NOTED NONE PRSNT: CPT | Performed by: INTERNAL MEDICINE

## 2024-03-08 PROCEDURE — 84439 ASSAY OF FREE THYROXINE: CPT

## 2024-03-08 PROCEDURE — 84153 ASSAY OF PSA TOTAL: CPT

## 2024-03-08 PROCEDURE — 3078F DIAST BP <80 MM HG: CPT | Performed by: INTERNAL MEDICINE

## 2024-03-08 PROCEDURE — 36415 COLL VENOUS BLD VENIPUNCTURE: CPT

## 2024-03-08 PROCEDURE — 99214 OFFICE O/P EST MOD 30 MIN: CPT | Performed by: INTERNAL MEDICINE

## 2024-03-08 PROCEDURE — G0009 ADMIN PNEUMOCOCCAL VACCINE: HCPCS | Performed by: INTERNAL MEDICINE

## 2024-03-08 PROCEDURE — 90677 PCV20 VACCINE IM: CPT | Performed by: INTERNAL MEDICINE

## 2024-03-08 PROCEDURE — 85025 COMPLETE CBC W/AUTO DIFF WBC: CPT

## 2024-03-08 PROCEDURE — 80053 COMPREHEN METABOLIC PANEL: CPT

## 2024-03-08 PROCEDURE — 84443 ASSAY THYROID STIM HORMONE: CPT

## 2024-03-08 PROCEDURE — 3074F SYST BP LT 130 MM HG: CPT | Performed by: INTERNAL MEDICINE

## 2024-03-08 PROCEDURE — 1159F MED LIST DOCD IN RCRD: CPT | Performed by: INTERNAL MEDICINE

## 2024-03-08 PROCEDURE — 80061 LIPID PANEL: CPT

## 2024-03-08 PROCEDURE — 82150 ASSAY OF AMYLASE: CPT

## 2024-03-08 RX ORDER — ATORVASTATIN CALCIUM 10 MG/1
10 TABLET, FILM COATED ORAL DAILY
Qty: 90 TABLET | Refills: 0 | Status: SHIPPED | OUTPATIENT
Start: 2024-03-08

## 2024-03-08 ASSESSMENT — ENCOUNTER SYMPTOMS
MUSCULOSKELETAL NEGATIVE: 1
NEUROLOGICAL NEGATIVE: 1
CONSTITUTIONAL NEGATIVE: 1
GASTROINTESTINAL NEGATIVE: 1
RESPIRATORY NEGATIVE: 1
PSYCHIATRIC NEGATIVE: 1
CARDIOVASCULAR NEGATIVE: 1
ENDOCRINE NEGATIVE: 1
HEMATOLOGIC/LYMPHATIC NEGATIVE: 1
EYES NEGATIVE: 1
ALLERGIC/IMMUNOLOGIC NEGATIVE: 1

## 2024-03-08 NOTE — PROGRESS NOTES
"Subjective   Patient ID: Israel Franks is a 84 y.o. male who presents for Follow-up (Follow up for refills ).    HPI     Review of Systems   Constitutional: Negative.    HENT: Negative.     Eyes: Negative.    Respiratory: Negative.     Cardiovascular: Negative.    Gastrointestinal: Negative.    Endocrine: Negative.    Musculoskeletal: Negative.    Skin: Negative.    Allergic/Immunologic: Negative.    Neurological: Negative.    Hematological: Negative.    Psychiatric/Behavioral: Negative.     All other systems reviewed and are negative.      Objective   Ht 1.778 m (5' 10\")   Wt 54.4 kg (120 lb)   BMI 17.22 kg/m²   Blood pressure 120/70, pulse 72, resp. rate 16, height 1.778 m (5' 10\"), weight 54.4 kg (120 lb).   Physical Exam  Vitals and nursing note reviewed.   Constitutional:       Appearance: Normal appearance.   HENT:      Head: Normocephalic and atraumatic.      Right Ear: Tympanic membrane, ear canal and external ear normal.      Left Ear: Tympanic membrane, ear canal and external ear normal. There is no impacted cerumen.      Nose: Nose normal.      Mouth/Throat:      Mouth: Mucous membranes are moist.      Pharynx: Oropharynx is clear.   Eyes:      Extraocular Movements: Extraocular movements intact.      Conjunctiva/sclera: Conjunctivae normal.      Pupils: Pupils are equal, round, and reactive to light.   Cardiovascular:      Rate and Rhythm: Normal rate and regular rhythm.      Pulses: Normal pulses.      Heart sounds: Normal heart sounds. No murmur heard.  Pulmonary:      Effort: Pulmonary effort is normal. No respiratory distress.      Breath sounds: Normal breath sounds. No stridor. No wheezing, rhonchi or rales.   Chest:      Chest wall: No tenderness.   Abdominal:      General: Abdomen is flat. Bowel sounds are normal. There is no distension.      Palpations: Abdomen is soft. There is no mass.      Tenderness: There is no abdominal tenderness. There is no right CVA tenderness, left CVA tenderness, " guarding or rebound.      Hernia: No hernia is present.   Musculoskeletal:         General: Normal range of motion.      Cervical back: Normal range of motion and neck supple.   Skin:     General: Skin is warm.      Capillary Refill: Capillary refill takes less than 2 seconds.   Neurological:      General: No focal deficit present.      Mental Status: He is alert.      Cranial Nerves: No cranial nerve deficit.      Sensory: No sensory deficit.      Motor: No weakness.      Coordination: Coordination normal.      Gait: Gait normal.      Deep Tendon Reflexes: Reflexes normal.   Psychiatric:         Mood and Affect: Mood normal.         Behavior: Behavior normal. Behavior is cooperative.         Thought Content: Thought content normal.         Judgment: Judgment normal.         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    Chronic renal impairment, 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    Hyperlipidemia E78.5    Pancreatic insufficiency K86.89    Relevant Orders    Amylase    Lipase     Other Visit Diagnoses         Codes    Hypercholesterolemia     E78.00    Relevant Medications    atorvastatin (Lipitor) 10 mg tablet    Other Relevant Orders    Lipid Panel    Need for vaccination with 20-polyvalent pneumococcal conjugate vaccine     Z23    Relevant Orders    Pneumococcal conjugate vaccine, 20-valent (PREVNAR 20)            Status Post  chronic pancreatitis due to pancreatic and biliary ducts  stones - with a drainage placement =      Leg edema and swelling - due to Venous Insufficiency. Improved and feels well - now - Recommend Compression stockings at 18-30 mmHg( and elevation of legs. Avoid standing still.Recommend walking and increase in exercise activity and weight loss. Also rule out sleep apnea and nocturnal hypoxia with overnight oximetry.Avoid sitting with legs down for along period of time (more than 30 to 40 minutes at the time). Recommend low salt diet - and rule out DVT in light of recent hospitalization - and immobilization - start Eliquis 5 mg BID pending Ultrasound of the legs to rule out DVT      HTN - hypertension well/controlled.Target BP < 130/80 well/not achieved. Educate low salt diet and exercise with weight loss. Educate home self monitoring and diary keeping. Educate risks of elevate blood pressure and benefits of prompt treatment. Hold the Edarbi 40 mg daily because his blood pressure now is OK and continues the Metoprolol succinate 25 mg daily      CRI - Chronic Renal Insuficiency. Secondary to DM/HTN/Atherosclerosis. Follow BUN/Cr. and lytes. RENOPROTECTION with ACEI/ARB (). Monitor microalbuminuria. Avoid hypotension and renal hypoperfusion. The patient was instructed to avoid NSAIDS and educate compliance with medications to control HTN(hypertension) and cholesterol and diabetes. get US of the kidneys and monitor function -     Anemia - possible of chronic disease - check Thyroid function and give B12 IM now and consider possible Metformin effect - and monitor colonoscopy and EGD - also monitor Hematocrit and Hemoglobin      BPH - Benign PROSTATIC Hypertrophy, + /Nocturia, Reviewed with the patient the PSA, prescribe Tamsulosin 0.4 mg qD. Educate exercises and Change in life style, prescribe vitamin E 400 IU qD. Consider referral to urology.     Dehydration - check renal function and encourage PO fluid intake -      Hypothyroidism - Symptoms well controlled (weight gain,  fatigue, constipation, cold intolerance). Check TSH and continues dose of Synthroid/Levothyroxine of 88 mcg/qD.     Aortic stenosis - moderate and asymptomatic - Reviewed with the patient the ECHO - cardiogram and monitor closely and Educate extensively symptoms of worsening      Tachycardia and Sinus arrhythmia - with a first degree AV block - asymptomatic but irregular rhythm- monitor and get an ECHO - cardiogram and increase the Metoprolol succinate 25 mg daily to 50 mg daily      Skin abscess - use rubbing alcohol post shave and start Doxycycline 100 mg BID for 14 days      Hypercholesterolemia - Monitor lipid profile and educate patient upon risks of high cholesterol and targets. Educate diet and change in lifestyle and increase in exercises - Refill: Atorvastatin 10 mg daily and educate compliance with medication and diet.      PVC - frequent - Reviewed with the patient the EKG and Rhythm strip and start Metoprolol 25 mg daily and get a Holter monitor for 2 weeks - and follows with cardiology -      Pancreatic insufficiency - cause ?? - supplement with Creon - 68555 units daily - monitor pancreas function - and weight loss -      High complexity visit of 45 minutes face-to-face with at least half of the time discussing my clinical findings, diagnosis and results of the blood work. The patient demonstrated good understanding of instructions and plans.      Risk Factors Identified During Visit: None.   Influenza: influenza vaccine was previously given.   Pneumovax 23/Prevnar 15: Pneumovax 23/Prevnar 15 vaccine was previously given.   Prevnar 20: Prevnar 20 vaccine was previously given.   Shingles Vaccine: Shingles vaccine was previously given.   Prostate cancer screening: Screening is current.   Colorectal Cancer Screening: screening is current.   Abdominal Aortic Aneurysm screening: screening is current.   HIV screening: screening not indicated.

## 2024-03-09 LAB — T4 FREE SERPL-MCNC: 0.94 NG/DL (ref 0.78–1.48)

## 2024-03-12 ENCOUNTER — TELEPHONE (OUTPATIENT)
Dept: PRIMARY CARE | Facility: CLINIC | Age: 84
End: 2024-03-12
Payer: MEDICARE

## 2024-03-12 DIAGNOSIS — R97.20 ABNORMAL PSA: ICD-10-CM

## 2024-03-14 NOTE — TELEPHONE ENCOUNTER
Pt informed of blood work results, he has already scheduled to see dr. Markham. Pt states that he was not on synthroid at the time of the blood test but has since restarted the medication so he would like to hold off on the increase at this time.

## 2024-03-18 ENCOUNTER — APPOINTMENT (OUTPATIENT)
Dept: GASTROENTEROLOGY | Facility: CLINIC | Age: 84
End: 2024-03-18
Payer: MEDICARE

## 2024-03-18 ENCOUNTER — APPOINTMENT (OUTPATIENT)
Dept: RHEUMATOLOGY | Facility: CLINIC | Age: 84
End: 2024-03-18
Payer: MEDICARE

## 2024-03-19 ENCOUNTER — CLINICAL SUPPORT (OUTPATIENT)
Dept: GASTROENTEROLOGY | Facility: CLINIC | Age: 84
End: 2024-03-19
Payer: MEDICARE

## 2024-03-19 PROCEDURE — 96372 THER/PROPH/DIAG INJ SC/IM: CPT | Performed by: INTERNAL MEDICINE

## 2024-03-19 PROCEDURE — 2500000004 HC RX 250 GENERAL PHARMACY W/ HCPCS (ALT 636 FOR OP/ED): Mod: JZ | Performed by: INTERNAL MEDICINE

## 2024-03-19 RX ADMIN — DENOSUMAB 60 MG: 60 INJECTION SUBCUTANEOUS at 13:04

## 2024-03-19 NOTE — PROGRESS NOTES
Patient received denosumab Prolia 60mg/ml in left upper arm subcutaneously once per order. Tolerated well. Received medication from [facility] pharmacy.

## 2024-04-10 ENCOUNTER — OFFICE VISIT (OUTPATIENT)
Dept: UROLOGY | Facility: CLINIC | Age: 84
End: 2024-04-10
Payer: MEDICARE

## 2024-04-10 VITALS — BODY MASS INDEX: 16.8 KG/M2 | WEIGHT: 120 LBS | HEIGHT: 71 IN | TEMPERATURE: 97.6 F

## 2024-04-10 DIAGNOSIS — R97.20 ABNORMAL PSA: Primary | ICD-10-CM

## 2024-04-10 LAB
POC APPEARANCE, URINE: CLEAR
POC BILIRUBIN, URINE: NEGATIVE
POC BLOOD, URINE: NEGATIVE
POC COLOR, URINE: YELLOW
POC GLUCOSE, URINE: NEGATIVE MG/DL
POC KETONES, URINE: NEGATIVE MG/DL
POC LEUKOCYTES, URINE: NEGATIVE
POC NITRITE,URINE: NEGATIVE
POC PH, URINE: 5.5 PH
POC PROTEIN, URINE: NEGATIVE MG/DL
POC SPECIFIC GRAVITY, URINE: 1.02
POC UROBILINOGEN, URINE: 0.2 EU/DL

## 2024-04-10 PROCEDURE — 51798 US URINE CAPACITY MEASURE: CPT | Performed by: UROLOGY

## 2024-04-10 PROCEDURE — 1159F MED LIST DOCD IN RCRD: CPT | Performed by: UROLOGY

## 2024-04-10 PROCEDURE — 1126F AMNT PAIN NOTED NONE PRSNT: CPT | Performed by: UROLOGY

## 2024-04-10 PROCEDURE — 99213 OFFICE O/P EST LOW 20 MIN: CPT | Performed by: UROLOGY

## 2024-04-10 PROCEDURE — 81002 URINALYSIS NONAUTO W/O SCOPE: CPT | Performed by: UROLOGY

## 2024-04-10 PROCEDURE — 1036F TOBACCO NON-USER: CPT | Performed by: UROLOGY

## 2024-04-10 ASSESSMENT — PAIN SCALES - GENERAL: PAINLEVEL: 0-NO PAIN

## 2024-04-10 NOTE — PROGRESS NOTES
History of Present Illness (HPI):  TODAY: (04/10/24)  Israel Franks is a 84 y.o. male with history of elevated PSA BPH controlled with Tamsulosin,     Presents today for follow up of his trend of rising PSA.     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    Iron deficiency anemia secondary to blood loss (chronic) 12/27/2016    Anemia due to blood loss    Other long term (current) drug therapy 06/17/2016    High risk medication use    Personal history of (healed) traumatic fracture     History of fracture of left hip    Tinnitus, right ear 06/17/2016    Tinnitus of right ear     Past Surgical History:   Procedure Laterality Date    CHOLECYSTECTOMY  04/15/2014    Cholecystectomy    COLONOSCOPY  06/09/2015    Complete Colonoscopy    OTHER SURGICAL HISTORY  04/15/2014    Partial Gastrectomy [place] II    US GUIDED ABSCESS DRAIN  7/6/2023    US GUIDED ABSCESS DRAIN 7/6/2023 [facility]     Family History   Problem Relation Name Age of Onset    Hypertension Father      Colon cancer Sister       Social History     Tobacco Use   Smoking Status Never   Smokeless Tobacco Never     Current Outpatient Medications   Medication Sig Dispense Refill    0.9 % sodium chloride (sodium chloride, PF, 0.9%) injection Flush biliary drain with 10 mL of solution twice daily. 600 mL 1    atorvastatin (Lipitor) 10 mg tablet Take 1 tablet (10 mg) by mouth once daily. 90 tablet 0    calcium citrate/vitamin D3 (CITRACAL REGULAR ORAL) Take by mouth.       chlorhexidine (Peridex) 0.12 % solution Use in the mouth or throat. AFTER BREAKFAST AND BEFORE BEDTIME FOR 1 WEEK      denosumab (Prolia) 60 mg/mL syringe Inject 1 mL (60 mg) under the skin every 6 months.      desonide (DesOwen) 0.05 % cream Desonide 0.05 % External Cream   Quantity: 60  Refills: 0        Start : 30-Sep-2013   Active      Edarbi 40 mg tablet TAKE 1 TABLET BY MOUTH DAILY 90 tablet 3    fluorouracil (Efudex) 5 % cream Fluorouracil 5 % External Cream   Quantity: 40  Refills: 0        Start : 13-Sep-2013   Active      hydroCHLOROthiazide (HYDRODiuril) 12.5 mg tablet Take 1 tablet (12.5 mg) by mouth once daily.      levothyroxine (Synthroid, Levoxyl) 88 mcg tablet Take 1 tablet (88 mcg) by mouth once daily. 90 tablet 0    lipase-protease-amylase (Zenpep) 40,000-126,000- 168,000 unit capsule Take 1 capsule by mouth 3 times a day.      metoprolol succinate XL (Toprol-XL) 50 mg 24 hr tablet TAKE 1 TABLET BY MOUTH DAILY 90 tablet 3    pancrelipase, Lip-Prot-Amyl, (Creon) 36,000-114,000- 180,000 unit capsule,delayed release(DR/EC) capsule Take 1 capsule by mouth 3 times a day.      tamsulosin (Flomax) 0.4 mg 24 hr capsule TAKE 1 CAPSULE BY MOUTH EVERY DAY 90 capsule 0     Current Facility-Administered Medications   Medication Dose Route Frequency Provider Last Rate Last Admin    denosumab (Prolia) injection 60 mg  60 mg subcutaneous q6 months Gio Leroy MD   60 mg at 03/19/24 1304     No Known Allergies  Past medical, surgical, family and social history in the chart was reviewed and is accurate including any additions to what is in this HPI.    Review of systems (ROS):   Pertinent information as listed in the HPI.        Objective   Visit Vitals  Temp 36.4 °C (97.6 °F) (Temporal)     Physical Exam:  Constitutional: NAD  HEENT: AT/NC  Resp: Non labored respirations.  Skin: No jaundice or visible skin lesions.  Neuro: No focal deficits.  Psych: Appropriate mood and affect.    Lab Review:  Lab Results    Component Value Date    WBC 8.4 03/08/2024    RBC 4.03 (L) 03/08/2024    HGB 12.7 (L) 03/08/2024    HCT 40.2 (L) 03/08/2024     03/08/2024      Lab Results   Component Value Date    BUN 35 (H) 03/08/2024    CREATININE 1.22 03/08/2024      Lab Results   Component Value Date    PSA 7.23 (H) 03/08/2024     Lab Results   Component Value Date    CHOL 162 03/08/2024    TRIG 71 03/08/2024    HDL 71.3 03/08/2024    ALT 24 03/08/2024    AST 26 03/08/2024     03/08/2024    K 4.7 03/08/2024     03/08/2024    CO2 30 03/08/2024    TSH 7.18 (H) 03/08/2024    INR 1.1 07/05/2023        ASSESSMENT:  Problem List Items Addressed This Visit    None  Visit Diagnoses       Abnormal PSA        Relevant Orders    Measure post void residual (Completed)    POCT UA (nonautomated) manually resulted (Completed)           PLAN:  -Repeat PSA in 6 months    E&M visit today is associated with current or anticipated ongoing medical care services related to a patient's single, serious condition or a complex condition.    All questions were answered to the patient’s satisfaction.  Patient agrees with the plan and wishes to proceed.  Continue follow-up for ongoing care of  chronic medical conditions.    Scribe Attestation  By signing my name below, ILoli Scribe   attest that this documentation has been prepared under the direction and in the presence of Nia Markham MD.

## 2024-04-12 ENCOUNTER — OFFICE VISIT (OUTPATIENT)
Dept: RHEUMATOLOGY | Facility: CLINIC | Age: 84
End: 2024-04-12
Payer: MEDICARE

## 2024-04-12 VITALS
TEMPERATURE: 98.1 F | HEIGHT: 71 IN | BODY MASS INDEX: 19.8 KG/M2 | HEART RATE: 79 BPM | DIASTOLIC BLOOD PRESSURE: 57 MMHG | WEIGHT: 141.4 LBS | SYSTOLIC BLOOD PRESSURE: 118 MMHG

## 2024-04-12 DIAGNOSIS — M81.0 OSTEOPOROSIS WITHOUT CURRENT PATHOLOGICAL FRACTURE, UNSPECIFIED OSTEOPOROSIS TYPE: Primary | ICD-10-CM

## 2024-04-12 PROCEDURE — 3074F SYST BP LT 130 MM HG: CPT | Performed by: INTERNAL MEDICINE

## 2024-04-12 PROCEDURE — 1036F TOBACCO NON-USER: CPT | Performed by: INTERNAL MEDICINE

## 2024-04-12 PROCEDURE — 3078F DIAST BP <80 MM HG: CPT | Performed by: INTERNAL MEDICINE

## 2024-04-12 PROCEDURE — 1159F MED LIST DOCD IN RCRD: CPT | Performed by: INTERNAL MEDICINE

## 2024-04-12 PROCEDURE — 1160F RVW MEDS BY RX/DR IN RCRD: CPT | Performed by: INTERNAL MEDICINE

## 2024-04-12 PROCEDURE — 1126F AMNT PAIN NOTED NONE PRSNT: CPT | Performed by: INTERNAL MEDICINE

## 2024-04-12 PROCEDURE — 99213 OFFICE O/P EST LOW 20 MIN: CPT | Performed by: INTERNAL MEDICINE

## 2024-04-12 RX ORDER — VIT A/VIT C/VIT E/ZINC/COPPER 4296-226
CAPSULE ORAL
COMMUNITY

## 2024-04-12 ASSESSMENT — PAIN SCALES - GENERAL: PAINLEVEL: 0-NO PAIN

## 2024-04-12 NOTE — PROGRESS NOTES
He presents for follow-up evaluation without any significant musculoskeletal complaints.  He is planned to have follow-up bone mineral density study in 6/2024.  The most recent Prolia injection was in 3/2024.  He has not fallen.  He has not noted any fractures.  He did not have any side effects from the Prolia injection.  He has been followed by urology for the elevated PSA and is on thyroid hormone replacement therapy for hypothyroidism.    He has a thin body habitus.  There is slight limitation to extension and right and leftward rotation of the neck.  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  There is preserved passive range of motion of the upper and lower extremity joints.  The lungs are clear to auscultation.  The heart has a regular rate and rhythm with a grade 3/6 systolic murmur at the left sternal border with extension to the apex and into the carotids.  The abdomen is benign.  The extremities are without edema.    DEXA bone density T-score..  (6/6/2019).  (3/11/2022)  L1-L4................................................. -1.5................. -1.2  Right femoral neck...................... -2.3................. -2.2  Right total femur...........................-2.3................. -2.0    Laboratory (3/8/2024) calcium 9.8, albumin 4.2, PSA 7.23, AST 26, ALT 24, alkaline phosphatase 86, BUN 35, creatinine 1.22, glucose 86, TSH 7.18, lipase 16, amylase 48.    He has elevated PSA, BPH, hypothyroidism, pancreatic insufficiency, hypertension, macrocytic anemia, osteopenia on Prolia, biliary duct dilatation status post ERCP, moderate aortic stenosis with mild aortic regurgitation.    He is to continue with plans for follow-up bone mineral density study.  He is to continue with plans for repeat Prolia injection in 9/2024.  He is to return in 4 to 5 months for reevaluation.

## 2024-04-30 NOTE — PROGRESS NOTES
History of Present Illness    Israel Franks is a 84 y.o. male who is seen at the request of Dr. Judah White.  Approximately a month ago or so he had an episode of having some difficulty swallowing that lasted maybe a week or 2.  He is now back to his baseline.  He does have some occasional choking.  He also occasionally will have some things that will tend to catch up on the right side of his throat and he uses Q-tips to dislodge the particle.  He denies any pain or discomfort.      Past Medical History    His past medical history and review the system shows elevated blood pressure, elevated cholesterol, hypothyroidism, prostate issues.  His medications are documented in the chart.  He does not have any known allergies to medicine.  He has not smoked in many many years.  He drinks minimally.  He is retired from [employer].  He is here alone today.    Physical Exam    The patient is alert and oriented. Examination of the external ears, ear canals, and eardrums, is within normal limits. Examination of the anterior and external nose is negative. Examination of the oral cavity and oropharynx is negative except from some scarring from the prior tonsillectomy when he was a kid.  In the right tonsillar fossa he has a string of mucosa that sits in the breeze.  I went ahead and I cut that string of mucosa.  There is no evidence of any mucosal lesions. There is good mobility of the tongue and palate. There is good mandibular excursion. Palpation of the parotid, neck, and thyroid field fails to show any worrisome masses or adenopathies.    A flexible laryngoscopy was carried out. Under topical Xylocaine and Pranav-Synephrine the scope was introduced through the nostril. The nasopharynx, base of tongue, hypopharynx, and larynx are visualized.  The vocal cords are normally mobile. There is no pooling of secretions in the piriform sinuses. There is no evidence of any mucosal lesions.    Assessment and Plan    Brief episode of  significant dysphagia which has now resolved.  1 has to wonder if this was not secondary to reflux.  I gave him a list of instructions for dietary modifications.    Mucosal band in the right tonsillar fossa status post tonsillectomy many years ago.  I elected to divide that level band.  That should prevent food from getting caught in that area.    I will see him on a as needed basis.

## 2024-05-01 ENCOUNTER — OFFICE VISIT (OUTPATIENT)
Dept: OTOLARYNGOLOGY | Facility: CLINIC | Age: 84
End: 2024-05-01
Payer: MEDICARE

## 2024-05-01 VITALS — TEMPERATURE: 97.1 F | WEIGHT: 118.8 LBS | HEIGHT: 71 IN | BODY MASS INDEX: 16.63 KG/M2

## 2024-05-01 DIAGNOSIS — R13.12 OROPHARYNGEAL DYSPHAGIA: ICD-10-CM

## 2024-05-01 PROCEDURE — 1036F TOBACCO NON-USER: CPT | Performed by: OTOLARYNGOLOGY

## 2024-05-01 PROCEDURE — 31575 DIAGNOSTIC LARYNGOSCOPY: CPT | Performed by: OTOLARYNGOLOGY

## 2024-05-01 PROCEDURE — 1160F RVW MEDS BY RX/DR IN RCRD: CPT | Performed by: OTOLARYNGOLOGY

## 2024-05-01 PROCEDURE — 1159F MED LIST DOCD IN RCRD: CPT | Performed by: OTOLARYNGOLOGY

## 2024-05-01 PROCEDURE — 99203 OFFICE O/P NEW LOW 30 MIN: CPT | Performed by: OTOLARYNGOLOGY

## 2024-05-20 DIAGNOSIS — E03.9 ACQUIRED HYPOTHYROIDISM: ICD-10-CM

## 2024-05-21 RX ORDER — LEVOTHYROXINE SODIUM 88 UG/1
88 TABLET ORAL DAILY
Qty: 90 TABLET | Refills: 0 | Status: SHIPPED | OUTPATIENT
Start: 2024-05-21

## 2024-05-26 DIAGNOSIS — R35.0 FREQUENCY OF MICTURITION: ICD-10-CM

## 2024-05-26 DIAGNOSIS — N40.1 BENIGN PROSTATIC HYPERPLASIA WITH LOWER URINARY TRACT SYMPTOMS: ICD-10-CM

## 2024-05-28 RX ORDER — TAMSULOSIN HYDROCHLORIDE 0.4 MG/1
0.4 CAPSULE ORAL DAILY
Qty: 90 CAPSULE | Refills: 0 | Status: SHIPPED | OUTPATIENT
Start: 2024-05-28

## 2024-06-06 ENCOUNTER — HOSPITAL ENCOUNTER (OUTPATIENT)
Dept: RADIOLOGY | Facility: EXTERNAL LOCATION | Age: 84
Discharge: HOME | End: 2024-06-06

## 2024-06-06 DIAGNOSIS — M25.551 RIGHT HIP PAIN: ICD-10-CM

## 2024-06-06 DIAGNOSIS — M54.50 LOW BACK PAIN, UNSPECIFIED BACK PAIN LATERALITY, UNSPECIFIED CHRONICITY, UNSPECIFIED WHETHER SCIATICA PRESENT: ICD-10-CM

## 2024-06-07 ENCOUNTER — APPOINTMENT (OUTPATIENT)
Dept: PRIMARY CARE | Facility: CLINIC | Age: 84
End: 2024-06-07
Payer: MEDICARE

## 2024-06-12 ENCOUNTER — APPOINTMENT (OUTPATIENT)
Dept: PRIMARY CARE | Facility: CLINIC | Age: 84
End: 2024-06-12
Payer: MEDICARE

## 2024-06-12 VITALS
BODY MASS INDEX: 16.24 KG/M2 | WEIGHT: 116 LBS | DIASTOLIC BLOOD PRESSURE: 72 MMHG | HEART RATE: 72 BPM | RESPIRATION RATE: 16 BRPM | HEIGHT: 71 IN | SYSTOLIC BLOOD PRESSURE: 130 MMHG

## 2024-06-12 DIAGNOSIS — E78.5 DYSLIPIDEMIA: ICD-10-CM

## 2024-06-12 DIAGNOSIS — R97.20 ABNORMAL PROSTATE SPECIFIC ANTIGEN (PSA): ICD-10-CM

## 2024-06-12 DIAGNOSIS — M70.61 GREATER TROCHANTERIC BURSITIS, RIGHT: ICD-10-CM

## 2024-06-12 DIAGNOSIS — I10 BENIGN ESSENTIAL HYPERTENSION: ICD-10-CM

## 2024-06-12 DIAGNOSIS — Z00.00 MEDICARE ANNUAL WELLNESS VISIT, SUBSEQUENT: Primary | ICD-10-CM

## 2024-06-12 DIAGNOSIS — E03.9 ACQUIRED HYPOTHYROIDISM: ICD-10-CM

## 2024-06-12 PROBLEM — E80.4 GILBERT'S SYNDROME: Status: ACTIVE | Noted: 2024-06-12

## 2024-06-12 PROBLEM — D50.0 ANEMIA DUE TO CHRONIC BLOOD LOSS: Status: ACTIVE | Noted: 2024-06-12

## 2024-06-12 PROBLEM — R13.10 DYSPHAGIA: Status: ACTIVE | Noted: 2024-05-20

## 2024-06-12 PROBLEM — S37.009A INJURY OF KIDNEY: Status: ACTIVE | Noted: 2024-06-12

## 2024-06-12 PROBLEM — M79.89 SWELLING OF LOWER EXTREMITY: Status: ACTIVE | Noted: 2024-06-12

## 2024-06-12 PROBLEM — S69.90XA FINGER INJURY: Status: RESOLVED | Noted: 2024-06-12 | Resolved: 2024-06-12

## 2024-06-12 PROBLEM — M81.8 DISUSE OSTEOPOROSIS: Status: ACTIVE | Noted: 2023-09-04

## 2024-06-12 PROBLEM — H93.19 TINNITUS: Status: ACTIVE | Noted: 2024-06-12

## 2024-06-12 PROBLEM — K28.9 JEJUNAL ULCER: Status: ACTIVE | Noted: 2024-06-12

## 2024-06-12 PROBLEM — I95.9 HYPOTENSION: Status: ACTIVE | Noted: 2024-06-12

## 2024-06-12 PROCEDURE — 1123F ACP DISCUSS/DSCN MKR DOCD: CPT | Performed by: INTERNAL MEDICINE

## 2024-06-12 PROCEDURE — 99214 OFFICE O/P EST MOD 30 MIN: CPT | Performed by: INTERNAL MEDICINE

## 2024-06-12 PROCEDURE — 3075F SYST BP GE 130 - 139MM HG: CPT | Performed by: INTERNAL MEDICINE

## 2024-06-12 PROCEDURE — 1036F TOBACCO NON-USER: CPT | Performed by: INTERNAL MEDICINE

## 2024-06-12 PROCEDURE — 1170F FXNL STATUS ASSESSED: CPT | Performed by: INTERNAL MEDICINE

## 2024-06-12 PROCEDURE — 3078F DIAST BP <80 MM HG: CPT | Performed by: INTERNAL MEDICINE

## 2024-06-12 PROCEDURE — 1160F RVW MEDS BY RX/DR IN RCRD: CPT | Performed by: INTERNAL MEDICINE

## 2024-06-12 PROCEDURE — 1158F ADVNC CARE PLAN TLK DOCD: CPT | Performed by: INTERNAL MEDICINE

## 2024-06-12 PROCEDURE — G0439 PPPS, SUBSEQ VISIT: HCPCS | Performed by: INTERNAL MEDICINE

## 2024-06-12 PROCEDURE — 1159F MED LIST DOCD IN RCRD: CPT | Performed by: INTERNAL MEDICINE

## 2024-06-12 RX ORDER — PREDNISOLONE ACETATE 10 MG/ML
1 SUSPENSION/ DROPS OPHTHALMIC 4 TIMES DAILY
COMMUNITY
Start: 2024-05-13

## 2024-06-12 ASSESSMENT — PATIENT HEALTH QUESTIONNAIRE - PHQ9
1. LITTLE INTEREST OR PLEASURE IN DOING THINGS: NOT AT ALL
SUM OF ALL RESPONSES TO PHQ9 QUESTIONS 1 AND 2: 0
2. FEELING DOWN, DEPRESSED OR HOPELESS: NOT AT ALL

## 2024-06-12 ASSESSMENT — ENCOUNTER SYMPTOMS
CARDIOVASCULAR NEGATIVE: 1
ENDOCRINE NEGATIVE: 1
DEPRESSION: 0
OCCASIONAL FEELINGS OF UNSTEADINESS: 0
EYES NEGATIVE: 1
CONSTITUTIONAL NEGATIVE: 1
RESPIRATORY NEGATIVE: 1
GASTROINTESTINAL NEGATIVE: 1
ALLERGIC/IMMUNOLOGIC NEGATIVE: 1
PSYCHIATRIC NEGATIVE: 1
HEMATOLOGIC/LYMPHATIC NEGATIVE: 1
MUSCULOSKELETAL NEGATIVE: 1
NEUROLOGICAL NEGATIVE: 1
LOSS OF SENSATION IN FEET: 0

## 2024-06-12 ASSESSMENT — ACTIVITIES OF DAILY LIVING (ADL)
TAKING_MEDICATION: INDEPENDENT
DRESSING: INDEPENDENT
DOING_HOUSEWORK: INDEPENDENT
BATHING: INDEPENDENT
GROCERY_SHOPPING: INDEPENDENT
MANAGING_FINANCES: INDEPENDENT

## 2024-06-12 NOTE — ASSESSMENT & PLAN NOTE
HTN - hypertension well/controlled .Target BP < 130/80  achieved. Educate low salt diet and exercise with weight loss. Educate home self monitoring and diary keeping. Educate risks of elevate blood pressure and benefits of prompt treatment.  Refill metoprolol and hydrochlorothiazide

## 2024-06-12 NOTE — ASSESSMENT & PLAN NOTE
Bursitis - of the Right Trochanteric Bursa. Tenderness to deep palpation of the R/L Trochanter. Recommend NSAIDS (Naprosin 500mg BID vs. Arthrotec 75mg BID), rest and mild exercises . Also consider  Injection of 120 mg of KENALOG along with 5cc of 2% Lidocaine into the bursa at the tender site. Procedure was tolerated well. Check X - ray of the Right  hip. Educate stretches and exercises

## 2024-06-12 NOTE — ASSESSMENT & PLAN NOTE
Hypothyroidism - Symptoms are  controlled (weight gain, fatigue, constipation, cold intolerance). Check TSH continues dose of Synthroid/Levothyroxine  of 88 mcg/qD.

## 2024-06-12 NOTE — PROGRESS NOTES
"Subjective   Patient ID: Israel Franks is a 84 y.o. male who presents for Medicare Annual Wellness Visit Subsequent (Mcr/Follow up).    HPI     Review of Systems   Constitutional: Negative.    HENT: Negative.     Eyes: Negative.    Respiratory: Negative.     Cardiovascular: Negative.    Gastrointestinal: Negative.    Endocrine: Negative.    Musculoskeletal: Negative.    Skin: Negative.    Allergic/Immunologic: Negative.    Neurological: Negative.    Hematological: Negative.    Psychiatric/Behavioral: Negative.     All other systems reviewed and are negative.      Objective   Ht 1.803 m (5' 11\")   Wt 52.6 kg (116 lb)   BMI 16.18 kg/m²   Blood pressure 130/72, pulse 72, resp. rate 16, height 1.803 m (5' 11\"), weight 52.6 kg (116 lb).   Physical Exam  Vitals and nursing note reviewed.   Constitutional:       Appearance: Normal appearance.   HENT:      Head: Normocephalic and atraumatic.      Right Ear: Tympanic membrane, ear canal and external ear normal.      Left Ear: Tympanic membrane, ear canal and external ear normal. There is no impacted cerumen.      Nose: Nose normal.      Mouth/Throat:      Mouth: Mucous membranes are moist.      Pharynx: Oropharynx is clear.   Eyes:      Extraocular Movements: Extraocular movements intact.      Conjunctiva/sclera: Conjunctivae normal.      Pupils: Pupils are equal, round, and reactive to light.   Cardiovascular:      Rate and Rhythm: Normal rate and regular rhythm.      Pulses: Normal pulses.      Heart sounds: Normal heart sounds. No murmur heard.  Pulmonary:      Effort: Pulmonary effort is normal. No respiratory distress.      Breath sounds: Normal breath sounds. No stridor. No wheezing, rhonchi or rales.   Chest:      Chest wall: No tenderness.   Abdominal:      General: Abdomen is flat. Bowel sounds are normal. There is no distension.      Palpations: Abdomen is soft. There is no mass.      Tenderness: There is no abdominal tenderness. There is no right CVA tenderness, " left CVA tenderness, guarding or rebound.      Hernia: No hernia is present.   Musculoskeletal:         General: Normal range of motion.      Cervical back: Normal range of motion and neck supple.   Skin:     General: Skin is warm.      Capillary Refill: Capillary refill takes less than 2 seconds.   Neurological:      General: No focal deficit present.      Mental Status: He is alert.      Cranial Nerves: No cranial nerve deficit.      Sensory: No sensory deficit.      Motor: No weakness.      Coordination: Coordination normal.      Gait: Gait normal.      Deep Tendon Reflexes: Reflexes normal.   Psychiatric:         Mood and Affect: Mood normal.         Behavior: Behavior normal. Behavior is cooperative.         Thought Content: Thought content normal.         Judgment: Judgment normal.         Assessment/Plan   Problem List Items Addressed This Visit             ICD-10-CM    Abnormal prostate specific antigen (PSA) R97.20     Monitor and follows the PSA and follows with urology         Benign essential hypertension I10     HTN - hypertension well/controlled .Target BP < 130/80  achieved. Educate low salt diet and exercise with weight loss. Educate home self monitoring and diary keeping. Educate risks of elevate blood pressure and benefits of prompt treatment.  Refill metoprolol and hydrochlorothiazide          Dyslipidemia E78.5     Hypercholesterolemia - Monitor lipid profile and educate patient upon risks of high cholesterol and targets. Educate diet and change in lifestyle and increase in exercises - Refill: Atorvastatin     and educate compliance with medication and diet.           Greater trochanteric bursitis, right M70.61     Bursitis - of the Right Trochanteric Bursa. Tenderness to deep palpation of the R/L Trochanter. Recommend NSAIDS (Naprosin 500mg BID vs. Arthrotec 75mg BID), rest and mild exercises . Also consider  Injection of 120 mg of KENALOG along with 5cc of 2% Lidocaine into the bursa at the  tender site. Procedure was tolerated well. Check X - ray of the Right  hip. Educate stretches and exercises          Hypothyroidism E03.9     Hypothyroidism - Symptoms are  controlled (weight gain, fatigue, constipation, cold intolerance). Check TSH continues dose of Synthroid/Levothyroxine  of 88 mcg/qD.          Medicare annual wellness visit, subsequent - Primary Z00.00     No recent hospitalizations.    All medications reviewed and reconciled by me the provider..  No use of controlled substances or opiates.    Family history, social history reviewed, no changes.    Patient does not smoke.    Patient does not drink.    Patient hydrates adequately daily.  Eats a well-balanced healthy diet.     Exercises adequately including walking and doing weightbearing exercises.    Patient denies any difficulty with memory or cognition.     Denies any difficulty with hearing.  Patient does not wear hearing aids.    No fall risk.  No recent falls.  Denies any difficulty walking.    Patient with no history of depression anxiety, denies any loss of interest, no feeling of sadness, no lack of motivation.    Patient is independent in all ADLs and IADLs.  Independent bathing, dressing, walking.  Takes care of own finances, shopping and cooking.     End-of-life decision-making power of  reviewed with patient.     Risk Factors Identified During Visit: None.   Influenza: influenza vaccine was previously given.   Pneumovax 23: Pneumovax 23 vaccine was previously given.   Prevnar 13: Prevnar 13 vaccine was previously given.   Shingles Vaccine: Shingles vaccine was previously given.   Prostate cancer screening: Screening is current.   Colorectal Cancer Screening: screening is current.   Abdominal Aortic Aneurysm screening: screening is current.   HIV screening: screening not indicated.       Preventive measures - Recommend ASAP : . Colonoscopy (educate patient risks of colon cancer) refer patient to GI specialist. Ophthalmology  and retina exam recommend yearly exams refer patient to an Ophthalmologist. BPH - (Benign Prostatic Hypertrophy) refer patient to an Urologist for rectal exam and PSA check.             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 Improved and feels well - now - Recommend Compression stockings at 18-30 mmHg( and elevation of legs. Avoid standing still.Recommend walking and increase in exercise activity and weight loss. Also rule out sleep apnea and nocturnal hypoxia with overnight oximetry.Avoid sitting with legs down for along period of time (more than 30 to 40 minutes at the time). Recommend low salt diet - and rule out DVT in light of recent hospitalization - and immobilization - start Eliquis 5 mg BID pending Ultrasound of the legs to rule out DVT      HTN - hypertension well/controlled.Target BP < 130/80 well/not achieved. Educate low salt diet and exercise with weight loss. Educate home self monitoring and diary keeping. Educate risks of elevate blood pressure and benefits of prompt treatment. Hold the Edarbi 40 mg daily because his blood pressure now is OK and continues the Metoprolol succinate 25 mg daily      CRI - Chronic Renal Insuficiency. Secondary to DM/HTN/Atherosclerosis. Follow BUN/Cr. and lytes. RENOPROTECTION with ACEI/ARB (). Monitor microalbuminuria. Avoid hypotension and renal hypoperfusion. The patient was instructed to avoid NSAIDS and educate compliance with medications to control HTN(hypertension) and cholesterol and diabetes. get US of the kidneys and monitor function -     Anemia - possible of chronic disease - check Thyroid function and give B12 IM now and consider possible Metformin effect - and monitor colonoscopy and EGD - also monitor Hematocrit and Hemoglobin      BPH - Benign PROSTATIC Hypertrophy, + /Nocturia, Reviewed with the patient the PSA, prescribe  Tamsulosin 0.4 mg qD. Educate exercises and Change in life style, prescribe vitamin E 400 IU qD. Consider referral to urology.     Dehydration - check renal function and encourage PO fluid intake -      Hypothyroidism - Symptoms well controlled (weight gain, fatigue, constipation, cold intolerance). Check TSH and continues dose of Synthroid/Levothyroxine of 88 mcg/qD.     Aortic stenosis - moderate and asymptomatic - Reviewed with the patient the ECHO - cardiogram and monitor closely and Educate extensively symptoms of worsening      Tachycardia and Sinus arrhythmia - with a first degree AV block - asymptomatic but irregular rhythm- monitor and get an ECHO - cardiogram and increase the Metoprolol succinate 25 mg daily to 50 mg daily      Skin abscess - use rubbing alcohol post shave and start Doxycycline 100 mg BID for 14 days      Hypercholesterolemia - Monitor lipid profile and educate patient upon risks of high cholesterol and targets. Educate diet and change in lifestyle and increase in exercises - Refill: Atorvastatin 10 mg daily and educate compliance with medication and diet.      PVC - frequent - Reviewed with the patient the EKG and Rhythm strip and start Metoprolol 25 mg daily and get a Holter monitor for 2 weeks - and follows with cardiology -      Pancreatic insufficiency - cause ?? - supplement with Creon - 69883 units daily - monitor pancreas function - and weight loss -      High complexity visit of 45 minutes face-to-face with at least half of the time discussing my clinical findings, diagnosis and results of the blood work. The patient demonstrated good understanding of instructions and plans.      Risk Factors Identified During Visit: None.   Influenza: influenza vaccine was previously given.   Pneumovax 23/Prevnar 15: Pneumovax 23/Prevnar 15 vaccine was previously given.   Prevnar 20: Prevnar 20 vaccine was previously given.   Shingles Vaccine: Shingles vaccine was previously given.   Prostate  cancer screening: Screening is current.   Colorectal Cancer Screening: screening is current.   Abdominal Aortic Aneurysm screening: screening is current.   HIV screening: screening not indicated.                  Immunizations/Injections      very important  Immunizations from outside sources need reconciliation.      Flu vaccine, quadrivalent, high-dose, preservative free, age 65y+ (FLUZONE)10/24/2022, 10/1/2021, 9/14/2020  Influenza, High Dose Seasonal, Preservative Free10/4/2019  Influenza, Unspecified1/3/2011  Moderna COVID-19 vaccine, bivalent, blue cap/gray label *Check age/dose*10/24/2022  Pneumococcal conjugate vaccine, 13-valent (PREVNAR 13)4/15/2014  Pneumococcal conjugate vaccine, 20-valent (PREVNAR 20)3/8/2024  Pneumococcal polysaccharide vaccine, 23-valent, age 2 years and older (PNEUMOVAX 23)11/30/2007  Tdap vaccine, age 7 year and older (BOOSTRIX, ADACEL)2/21/2012  Zoster vaccine, recombinant, adult (SHINGRIX)12/28/2020, 9/28/2020  Zoster, live5/31/2012

## 2024-06-26 ENCOUNTER — HOSPITAL ENCOUNTER (OUTPATIENT)
Dept: RADIOLOGY | Facility: CLINIC | Age: 84
Discharge: HOME | End: 2024-06-26
Payer: MEDICARE

## 2024-06-26 DIAGNOSIS — M81.0 AGE-RELATED OSTEOPOROSIS WITHOUT CURRENT PATHOLOGICAL FRACTURE: ICD-10-CM

## 2024-06-26 PROCEDURE — 77080 DXA BONE DENSITY AXIAL: CPT | Performed by: RADIOLOGY

## 2024-06-26 PROCEDURE — 77080 DXA BONE DENSITY AXIAL: CPT

## 2024-07-09 ENCOUNTER — APPOINTMENT (OUTPATIENT)
Dept: CARDIOLOGY | Facility: CLINIC | Age: 84
End: 2024-07-09
Payer: MEDICARE

## 2024-07-12 ENCOUNTER — ANCILLARY PROCEDURE (OUTPATIENT)
Dept: CARDIOLOGY | Facility: CLINIC | Age: 84
End: 2024-07-12
Payer: MEDICARE

## 2024-07-12 DIAGNOSIS — I35.8 AORTIC VALVE SCLEROSIS: ICD-10-CM

## 2024-07-12 PROCEDURE — 93306 TTE W/DOPPLER COMPLETE: CPT

## 2024-07-12 PROCEDURE — 93306 TTE W/DOPPLER COMPLETE: CPT | Performed by: INTERNAL MEDICINE

## 2024-07-15 DIAGNOSIS — K64.4 EXTERNAL HEMORRHOID: ICD-10-CM

## 2024-07-15 LAB
AORTIC VALVE MEAN GRADIENT: 35.2 MMHG
AORTIC VALVE PEAK VELOCITY: 3.95 M/S
AV PEAK GRADIENT: 62.5 MMHG
EJECTION FRACTION: 58 %
LEFT VENTRICLE INTERNAL DIMENSION DIASTOLE: 4.57 CM (ref 3.5–6)
LEFT VENTRICULAR OUTFLOW TRACT DIAMETER: 2.09 CM
MITRAL VALVE E/A RATIO: 0.92
RIGHT VENTRICLE FREE WALL PEAK S': 14 CM/S
RIGHT VENTRICLE PEAK SYSTOLIC PRESSURE: 29.2 MMHG
TRICUSPID ANNULAR PLANE SYSTOLIC EXCURSION: 2.5 CM

## 2024-07-15 RX ORDER — HYDROCORTISONE ACETATE PRAMOXINE HCL 1; 1 G/100G; G/100G
CREAM TOPICAL 2 TIMES DAILY
Qty: 30 G | Refills: 0 | Status: SHIPPED | OUTPATIENT
Start: 2024-07-15

## 2024-07-18 DIAGNOSIS — I35.0 AORTIC VALVE STENOSIS, ETIOLOGY OF CARDIAC VALVE DISEASE UNSPECIFIED: ICD-10-CM

## 2024-07-22 DIAGNOSIS — E03.9 ACQUIRED HYPOTHYROIDISM: ICD-10-CM

## 2024-07-23 RX ORDER — LEVOTHYROXINE SODIUM 88 UG/1
88 TABLET ORAL DAILY
Qty: 90 TABLET | Refills: 0 | Status: SHIPPED | OUTPATIENT
Start: 2024-07-23

## 2024-07-31 NOTE — PROGRESS NOTES
Referred by Dr. Miranda for New Patient Visit (Referred by Dr White for aortic valve sclerosis. Pt denies CP, palpitations, and SOB)     HPI:    Israel Franks is a 84 y.o. male with pertinent history of  has a past medical history of Epigastric pain (12/28/2016), Finger injury (06/12/2024), Gastrojejunal ulcer, unspecified as acute or chronic, without hemorrhage or perforation, Gilbert syndrome, Hyperlipidemia, Hypertension, Iron deficiency anemia secondary to blood loss (chronic), Iron deficiency anemia secondary to blood loss (chronic) (12/27/2016), Other long term (current) drug therapy (06/17/2016), Personal history of (healed) traumatic fracture, and Tinnitus, right ear (06/17/2016).   presents to cardiology clinic to establish care.        No exacerbating or relieving factors.  Patient denies chest pain and angina.  Pt denies orthopnea, and paroxysmal nocturnal dyspnea.  Pt denies worsening lower extremity edema.  Pt denies palpitations or syncope.  No recent falls.  No fever or chills.  No cough.  No change in bowel or bladder habits.  No sick contacts.  No recent travel.    12 point review of systems including (Constitutional, Eyes, ENMT, Respiratory, Cardiac, Gastrointestinal, Neurological, Psychiatric, and Hematologic) was performed and is otherwise negative.    Past medical history reviewed:   has a past medical history of Epigastric pain (12/28/2016), Finger injury (06/12/2024), Gastrojejunal ulcer, unspecified as acute or chronic, without hemorrhage or perforation, Gilbert syndrome, Hyperlipidemia, Hypertension, Iron deficiency anemia secondary to blood loss (chronic), Iron deficiency anemia secondary to blood loss (chronic) (12/27/2016), Other long term (current) drug therapy (06/17/2016), Personal history of (healed) traumatic fracture, and Tinnitus, right ear (06/17/2016).    Past surgical history reviewed:   has a past surgical history that includes Cholecystectomy (04/15/2014); Other surgical  "history (04/15/2014); Colonoscopy (06/09/2015); and US guided abscess drain (7/6/2023).    Social history reviewed:   reports that he quit smoking about 61 years ago. His smoking use included cigarettes. He started smoking about 55 years ago. He has never used smokeless tobacco. He reports that he does not currently use alcohol after a past usage of about 3.0 standard drinks of alcohol per week. He reports that he does not use drugs.     Family history reviewed:    Family History   Problem Relation Name Age of Onset    Hypertension Father      Colon cancer Sister         Allergies reviewed: Patient has no known allergies.     Medications reviewed:   Current Outpatient Medications   Medication Instructions    atorvastatin (LIPITOR) 10 mg, oral, Daily    calcium citrate/vitamin D3 (CITRACAL REGULAR ORAL) oral    Edarbi 40 mg tablet 1 tablet, oral, Daily    hydrocortisone-pramoxine (Analpram-HC) 1-1 % rectal cream rectal, 2 times daily    levothyroxine (SYNTHROID, LEVOXYL) 88 mcg, oral, Daily    metoprolol succinate XL (TOPROL-XL) 50 mg, oral, Daily    Prolia 60 mg, subcutaneous, Every 6 months    tamsulosin (FLOMAX) 0.4 mg, oral, Daily    vitamins A,C,E-zinc-copper (ICaps AREDS) 4,296 mcg-226 mg-90 mg capsule oral        Vitals reviewed: Visit Vitals  /69 (Patient Position: Sitting)   Pulse 74       Physical Exam: /69 (Patient Position: Sitting)   Pulse 74   Ht 1.778 m (5' 10\")   Wt 52.2 kg (115 lb)   SpO2 97%   BMI 16.50 kg/m²     General:  Patient is awake, alert, and oriented.  Patient is in no acute distress.  HEENT:  Pupils equal and reactive.  Normocephalic.  Moist mucosa.    Neck:  No thyromegaly.  Normal Jugular Venous Pressure.  Cardiovascular:  Regular rate and rhythm.  Normal S1 and S2.  3/6 KILO.  Pulmonary:  Clear to auscultation bilaterally.  Abdomen:  Soft. Non-tender.   Non-distended.  Positive bowel sounds.  Lower Extremities:  2+ pedal pulses. No LE edema.  Neurologic:  Cranial nerves " "intact.  No focal deficit.   Skin: Skin warm and dry, normal skin turgor.   Psychiatric: Normal affect.    Last Labs:  CBC -      Lab Results   Component Value Date    WBC 8.4 03/08/2024    HGB 12.7 (L) 03/08/2024    HCT 40.2 (L) 03/08/2024     03/08/2024        CMP-  Lab Results   Component Value Date    GLUCOSE 86 03/08/2024     03/08/2024    K 4.7 03/08/2024     03/08/2024    CO2 30 03/08/2024    ANIONGAP 12 03/08/2024    BUN 35 (H) 03/08/2024    CREATININE 1.22 03/08/2024    EGFR 58 (L) 03/08/2024    CALCIUM 9.8 03/08/2024    PHOS 3.2 02/14/2019    PROT 7.2 03/08/2024    ALBUMIN 4.2 03/08/2024    AST 26 03/08/2024    ALT 24 03/08/2024    ALKPHOS 86 03/08/2024    BILITOT 1.0 03/08/2024        LIPIDS-  Lab Results   Component Value Date    CHOL 162 03/08/2024    TRIG 71 03/08/2024    HDL 71.3 03/08/2024    CHHDL 2.3 03/08/2024    VLDL 14 03/08/2024        OTHERS-  No results found for: \"HGBA1C\", \"BNP\"     I personally reviewed the patient's recent vitals, labs, medications, orders, EKGs, pertinent cardiac imaging/ echocardiography and ischemic evaluations including stress testing/ cardiac catheterization.    Echocardiogram 07/2024  CONCLUSIONS:   1. Poorly visualized anatomical structures due to suboptimal image quality.   2. Left ventricular ejection fraction is normal, by visual estimate at 55-60%.   3. Apical 2 Chamber and Apical 3 chamnber were not obtained due to anatomical issues. As such, wall motion is indeterminate. LVEF is estimated off limited views but may not accurately reflect true LVEF.   4. Spectral Doppler shows an abnormal pattern of left ventricular diastolic filling.   5. There is an elevated mean left atrial pressure.   6. There is normal right ventricular global systolic function.   7. There is moderate mitral annular calcification.   8. RVSP within normal limits.   9. Aortic valve appears abnormal.  10. Moderate to severe aortic valve stenosis.  11. There is severe aortic " "valve cusp calcification.  12. Mild to moderate aortic valve regurgitation.  13. Within the limitations of current study, the LVEF appears stable in contect ot limited views. Diastology abnormal in the setting of moderate MAC. Aortic Stenosis has progressed.    Echocardiogram 08/2022  CONCLUSIONS:   1. The left ventricular systolic function is normal with a 65-70% estimated ejection fraction.   2. Spectral Doppler shows an impaired relaxation pattern of left ventricular diastolic filling.   3. There is moderate mitral annular calcification.   4. Moderate aortic valve stenosis.   5. There is moderate to severe aortic valve cusp calcification.   6. There is mild aortic valve regurgitation.       Assessment and Plan:  Problem List Items Addressed This Visit       Benign essential hypertension - Primary    Overview     Blood pressure curerntly well controlled   -- Continue current medication          Relevant Orders    ECG 12 lead (Clinic Performed) (Completed)    Dyslipidemia    Overview     The ASCVD Risk score (Sarbjit ANN, et al., 2019) failed to calculate for the following reasons:    The 2019 ASCVD risk score is only valid for ages 40 to 79    Lab Results   Component Value Date    CHOL 162 03/08/2024    CHOL 93 07/01/2023    CHOL 137 11/04/2022     Lab Results   Component Value Date    HDL 71.3 03/08/2024    HDL 50.1 07/01/2023    HDL 65.9 11/04/2022     Lab Results   Component Value Date    LDLCALC 77 03/08/2024     Lab Results   Component Value Date    TRIG 71 03/08/2024    TRIG 61 07/01/2023    TRIG 72 11/04/2022     No components found for: \"CHOLHDL\"             Current Assessment & Plan     Currently managed with Atorvastatin 10 mg          Nonrheumatic aortic valve stenosis    Overview     Echocardiogram 07/2024  There is evidence of moderate to severe aortic valve stenosis. There is mild to moderate aortic valve regurgitation. The peak instantaneous gradient of the aortic valve is 62.5 mmHg. The mean gradient " "of the aortic valve is 35.2 mmHg.            Current Assessment & Plan     Patient remains asymptomatic and participates in strenuous exercisexs 3X / week   -- He engages with Aerobic Exercises by Treadmill / Eliptical / Exercise Bike for 30-35 minutes. He does 20 minutes with one and then finishes on another machines due to \"boredom\"   -- repeat echocardiogram in 6 months for gradients  -- Anticipate TAVR in the future          Relevant Orders    Transthoracic echo (TTE) limited    PVC (premature ventricular contraction)         Please followup with me in Cardiology clinic within the next 6 months.  Please return to clinic sooner or seek emergent care if your symptoms reoccur or worsen.    Thank you for allowing me to participate in their care.  Please feel free to call me with any further questions or concerns.        Ascencion Miranda DO   Division of Cardiovascular Medicine  [facility], [facility]           "

## 2024-08-01 ENCOUNTER — OFFICE VISIT (OUTPATIENT)
Dept: CARDIOLOGY | Facility: CLINIC | Age: 84
End: 2024-08-01
Payer: MEDICARE

## 2024-08-01 VITALS
HEART RATE: 74 BPM | SYSTOLIC BLOOD PRESSURE: 125 MMHG | WEIGHT: 115 LBS | BODY MASS INDEX: 16.46 KG/M2 | OXYGEN SATURATION: 97 % | HEIGHT: 70 IN | DIASTOLIC BLOOD PRESSURE: 69 MMHG

## 2024-08-01 DIAGNOSIS — I10 BENIGN ESSENTIAL HYPERTENSION: Primary | ICD-10-CM

## 2024-08-01 DIAGNOSIS — E78.5 DYSLIPIDEMIA: ICD-10-CM

## 2024-08-01 DIAGNOSIS — K64.4 EXTERNAL HEMORRHOID: ICD-10-CM

## 2024-08-01 DIAGNOSIS — I35.0 NONRHEUMATIC AORTIC VALVE STENOSIS: ICD-10-CM

## 2024-08-01 DIAGNOSIS — I49.3 PVC (PREMATURE VENTRICULAR CONTRACTION): ICD-10-CM

## 2024-08-01 PROCEDURE — 3074F SYST BP LT 130 MM HG: CPT | Performed by: INTERNAL MEDICINE

## 2024-08-01 PROCEDURE — 93005 ELECTROCARDIOGRAM TRACING: CPT | Performed by: INTERNAL MEDICINE

## 2024-08-01 PROCEDURE — 99214 OFFICE O/P EST MOD 30 MIN: CPT | Performed by: INTERNAL MEDICINE

## 2024-08-01 PROCEDURE — G2211 COMPLEX E/M VISIT ADD ON: HCPCS | Performed by: INTERNAL MEDICINE

## 2024-08-01 PROCEDURE — 99204 OFFICE O/P NEW MOD 45 MIN: CPT | Performed by: INTERNAL MEDICINE

## 2024-08-01 PROCEDURE — 1123F ACP DISCUSS/DSCN MKR DOCD: CPT | Performed by: INTERNAL MEDICINE

## 2024-08-01 PROCEDURE — 3078F DIAST BP <80 MM HG: CPT | Performed by: INTERNAL MEDICINE

## 2024-08-01 PROCEDURE — 1159F MED LIST DOCD IN RCRD: CPT | Performed by: INTERNAL MEDICINE

## 2024-08-01 PROCEDURE — 1126F AMNT PAIN NOTED NONE PRSNT: CPT | Performed by: INTERNAL MEDICINE

## 2024-08-01 RX ORDER — HYDROCORTISONE ACETATE PRAMOXINE HCL 1; 1 G/100G; G/100G
CREAM TOPICAL 2 TIMES DAILY
Qty: 30 G | Refills: 0 | Status: SHIPPED | OUTPATIENT
Start: 2024-08-01

## 2024-08-01 ASSESSMENT — PAIN SCALES - GENERAL: PAINLEVEL: 0-NO PAIN

## 2024-08-01 ASSESSMENT — ENCOUNTER SYMPTOMS
DEPRESSION: 0
LOSS OF SENSATION IN FEET: 0
OCCASIONAL FEELINGS OF UNSTEADINESS: 0

## 2024-08-01 NOTE — ASSESSMENT & PLAN NOTE
"Patient remains asymptomatic and participates in strenuous exercisexs 3X / week   -- He engages with Aerobic Exercises by Treadmill / Eliptical / Exercise Bike for 30-35 minutes. He does 20 minutes with one and then finishes on another machines due to \"boredom\"   -- repeat echocardiogram in 6 months for gradients  -- Anticipate TAVR in the future   "

## 2024-08-05 LAB
ATRIAL RATE: 81 BPM
P AXIS: 88 DEGREES
P OFFSET: 179 MS
P ONSET: 113 MS
PR INTERVAL: 234 MS
Q ONSET: 230 MS
QRS COUNT: 13 BEATS
QRS DURATION: 126 MS
QT INTERVAL: 388 MS
QTC CALCULATION(BAZETT): 450 MS
QTC FREDERICIA: 428 MS
R AXIS: -83 DEGREES
T AXIS: 80 DEGREES
T OFFSET: 424 MS
VENTRICULAR RATE: 81 BPM

## 2024-08-08 NOTE — PATIENT INSTRUCTIONS
"It was a pleasure seeing you today. I would like to see you back in clinic in  6  months. You can call my office if questions arise between now and our next visit.     Today, we talked about your aortic valve   -- You have Aortic Stenosis. At some point, the valve will need to be replaced by a catheter based system ( Not Open Heart) . We will continue to follow up and repeat echcoardiogram about every 6 months to reassess the gradients.     -- It is important to stay as active as you can. This helps us to gauge how you are doing.  Please reach out if you \"Start Slowing down\" or if you begin to struggle with  activities you previously could do.     Below are some Heart Health Tips that we provide to all of our patients. I hope you fing them useful.     - If you are having problems with medications, consider looking at the following websites.   --  \"GoodRx\"   --  \"[facility]\"      - We are happy to supply written prescriptions if needed to allow you to obtain your medications from different pharmacies. Additionally, if you are having issues with mail order delivery, please let us know. We can send a limited supply of your medications to your local pharmacy.     -  We recommend you follow a heart healthy diet. Watch food labels and try not to eat more than 2,500 mg of sodium per day. Avoid foods high in salt like processed meats (lunch meats, alvarez, and sausage), processed foods (boxed dinners, canned soups), fried and fast foods. Monitor serving sizes and if the sodium per serving size is more than 200 mg, avoid those foods. If the sodium per serving size is between 100-200 mg, you can use those in limited quantities. Try to choose foods where the amount of sodium per serving size is less than 100 mg. Try to eat a diet rich in fruits and vegetables, whole grains, low fat dairy products, skinless poultry and fish, nuts, beans, non-tropical vegetable oils. Limit saturated fat, trans fat, sodium, " red meats, and sugar-sweetened beverages.   Limit alcohol     -The combination of a reduced-calorie diet and increased physical activity is recommended. Adults should aim to get at least 150 minutes of moderate physical activity per week (30 minutes of moderate physical activities at least 5 days per week). Examples of moderate physical activities include brisk walking, swimming, aerobic dancing, heavy gardening, jumping rope, bicycling 10 MPH or faster, tennis, hiking uphill or with a heavy backpack. Please let us know if you would like to learn more about your nutrition and calories and additional options including weight loss programs to help you reach your goal.     -If you smoke, stop smoking. If you stop smoking you can help get rid of a major source of stress to your heart. Smoking makes your heart rate and blood pressure go up and increases your risk or developing cardiovascular diseases and worsen symptoms associated with heart failure.     -Obtain a BP monitor and monitor your BP daily. Check it around the same time each day; at least 1 hour after taking your medications. Record your BP in a log and bring your log with you to your doctors appointment.     -F/u with your PCP as recommended.

## 2024-09-03 DIAGNOSIS — M81.0 OSTEOPOROSIS, UNSPECIFIED OSTEOPOROSIS TYPE, UNSPECIFIED PATHOLOGICAL FRACTURE PRESENCE: Primary | ICD-10-CM

## 2024-09-03 RX ORDER — DIPHENHYDRAMINE HYDROCHLORIDE 50 MG/ML
50 INJECTION INTRAMUSCULAR; INTRAVENOUS AS NEEDED
OUTPATIENT
Start: 2024-09-19

## 2024-09-03 RX ORDER — EPINEPHRINE 0.3 MG/.3ML
0.3 INJECTION SUBCUTANEOUS EVERY 5 MIN PRN
OUTPATIENT
Start: 2024-09-19

## 2024-09-03 RX ORDER — ALBUTEROL SULFATE 0.83 MG/ML
3 SOLUTION RESPIRATORY (INHALATION) AS NEEDED
OUTPATIENT
Start: 2024-09-19

## 2024-09-03 RX ORDER — FAMOTIDINE 10 MG/ML
20 INJECTION INTRAVENOUS ONCE AS NEEDED
OUTPATIENT
Start: 2024-09-19

## 2024-09-04 ENCOUNTER — SPECIALTY PHARMACY (OUTPATIENT)
Dept: PHARMACY | Facility: CLINIC | Age: 84
End: 2024-09-04

## 2024-09-04 DIAGNOSIS — I10 PRIMARY HYPERTENSION: ICD-10-CM

## 2024-09-05 ENCOUNTER — DOCUMENTATION (OUTPATIENT)
Dept: INFUSION THERAPY | Facility: CLINIC | Age: 84
End: 2024-09-05
Payer: MEDICARE

## 2024-09-05 RX ORDER — METOPROLOL SUCCINATE 50 MG/1
50 TABLET, EXTENDED RELEASE ORAL DAILY
Qty: 90 TABLET | Refills: 1 | Status: SHIPPED | OUTPATIENT
Start: 2024-09-05

## 2024-09-05 NOTE — PROGRESS NOTES
"CLINICAL CLEARANCE FOR OUTPATIENT INJECTION      Patient to be scheduled for Continuation of Prolia injections.    For Diagnosis: Osteoporosis    Labs..  Calcium drawn on:   Lab Results   Component Value Date    CALCIUM 9.8 03/08/2024    PHOS 3.2 02/14/2019    No results found for: \"CAION\"   Lab Results   Component Value Date    GLUCOSE 86 03/08/2024    CALCIUM 9.8 03/08/2024     03/08/2024    K 4.7 03/08/2024    CO2 30 03/08/2024     03/08/2024    BUN 35 (H) 03/08/2024    CREATININE 1.22 03/08/2024        Chemistry    Lab Results   Component Value Date/Time     03/08/2024 1229    K 4.7 03/08/2024 1229     03/08/2024 1229    CO2 30 03/08/2024 1229    BUN 35 (H) 03/08/2024 1229    CREATININE 1.22 03/08/2024 1229    Lab Results   Component Value Date/Time    CALCIUM 9.8 03/08/2024 1229    ALKPHOS 86 03/08/2024 1229    AST 26 03/08/2024 1229    ALT 24 03/08/2024 1229    BILITOT 1.0 03/08/2024 1229           Calcium >8.6? Yes - will need updated labs prior to injection   (CA must be >8.6mg/dl or ionized calcium WNL.  Hold / Contact provider if <8.6) (must be within 28 days of scheduled injection)    Lab Results   Component Value Date    CREATININE 1.22 03/08/2024        Crcl: 32.302  (Patients with a crcl <30 are at increased risk of hypocalcemia)      *Not a hard stop. If crcl < 30 pt to discuss supplementation with prescribing provider.    Calcium and Vitamin D supplement on medication list? Yes    Current Outpatient Medications:     atorvastatin (Lipitor) 10 mg tablet, Take 1 tablet (10 mg) by mouth once daily., Disp: 90 tablet, Rfl: 0    calcium citrate/vitamin D3 (CITRACAL REGULAR ORAL), Take by mouth., Disp: , Rfl:     denosumab (Prolia) 60 mg/mL syringe, Inject 1 mL (60 mg total) under the skin every 6 months., Disp: 1 mL, Rfl: 1    Edarbi 40 mg tablet, TAKE 1 TABLET BY MOUTH DAILY, Disp: 90 tablet, Rfl: 3    hydrocortisone-pramoxine (Analpram-HC) 1-1 % rectal cream, Insert into the rectum " 2 times a day., Disp: 30 g, Rfl: 0    levothyroxine (Synthroid, Levoxyl) 88 mcg tablet, TAKE 1 TABLET BY MOUTH ONCE  DAILY, Disp: 90 tablet, Rfl: 0    metoprolol succinate XL (Toprol-XL) 50 mg 24 hr tablet, TAKE 1 TABLET BY MOUTH DAILY, Disp: 90 tablet, Rfl: 1    tamsulosin (Flomax) 0.4 mg 24 hr capsule, TAKE 1 CAPSULE BY MOUTH EVERY DAY, Disp: 90 capsule, Rfl: 0    vitamins A,C,E-zinc-copper (ICaps AREDS) 4,296 mcg-226 mg-90 mg capsule, Take by mouth., Disp: , Rfl:    (if no nurse to encourage discussion of supplementation at visit)    No obvious recent dental work per chart review. Nurse to confirm no dental within past/next 4 weeks at encounter.    Urine Hcg test ordered prior to first injection?Not applicable (If female pt <60 years of age and with reproductive capability)       Due:      Signed         Cont of prolia inj - due 9/19/24. Sending referral to [facility]                 Ok to schedule for prolia; please instruct pt to have labs drawn no more than 28 days prior to their scheduled appointment

## 2024-09-13 ENCOUNTER — OFFICE VISIT (OUTPATIENT)
Dept: RHEUMATOLOGY | Facility: CLINIC | Age: 84
End: 2024-09-13
Payer: MEDICARE

## 2024-09-13 VITALS
OXYGEN SATURATION: 94 % | WEIGHT: 114.6 LBS | HEIGHT: 71 IN | DIASTOLIC BLOOD PRESSURE: 50 MMHG | TEMPERATURE: 96.7 F | HEART RATE: 72 BPM | BODY MASS INDEX: 16.04 KG/M2 | SYSTOLIC BLOOD PRESSURE: 91 MMHG

## 2024-09-13 DIAGNOSIS — M81.0 OSTEOPOROSIS WITHOUT CURRENT PATHOLOGICAL FRACTURE, UNSPECIFIED OSTEOPOROSIS TYPE: Primary | ICD-10-CM

## 2024-09-13 PROCEDURE — 1036F TOBACCO NON-USER: CPT | Performed by: INTERNAL MEDICINE

## 2024-09-13 PROCEDURE — 1159F MED LIST DOCD IN RCRD: CPT | Performed by: INTERNAL MEDICINE

## 2024-09-13 PROCEDURE — 1123F ACP DISCUSS/DSCN MKR DOCD: CPT | Performed by: INTERNAL MEDICINE

## 2024-09-13 PROCEDURE — 99213 OFFICE O/P EST LOW 20 MIN: CPT | Performed by: INTERNAL MEDICINE

## 2024-09-13 PROCEDURE — 3078F DIAST BP <80 MM HG: CPT | Performed by: INTERNAL MEDICINE

## 2024-09-13 PROCEDURE — 3074F SYST BP LT 130 MM HG: CPT | Performed by: INTERNAL MEDICINE

## 2024-09-13 NOTE — PROGRESS NOTES
He presents for follow-up evaluation without any significant musculoskeletal pain.  He has not fallen or had any bone fractures.  He does not have any planned dental procedures for the near future.  He has been able to tolerate Prolia injections previously.  He has been doing weightbearing exercise.  He had left cataract extraction (6/3/2024).    He has a thin body habitus with scoliosis of the thoracolumbar spine and exaggerated kyphosis of the dorsal spine.  There are no swollen joints.  There is no tenderness to palpation of the paraspinous muscles.    DEXA bone density T-score..  (6/26/2024)..  (3/11/2022)  L1-L4................................................... -1.9................... -1.2  Right total femur............................ -1.9................... -2.0  Right femoral neck......................... -1.9................... -2.2    He has osteopenia with slight improvement in bone density of the right femur and slight worsening of the bone mineral density of the lumbar spine.  He has history of BPH with elevated PSA, hypothyroidism, pancreatic insufficiency, hypertension, macrocytic anemia, aortic stenosis with mild aortic regurgitation.    He is to continue Prolia injections every 6 months.  He is to have laboratory for comprehensive metabolic panel prior to the Prolia injection.

## 2024-09-27 ENCOUNTER — LAB (OUTPATIENT)
Dept: LAB | Facility: LAB | Age: 84
End: 2024-09-27
Payer: MEDICARE

## 2024-09-27 DIAGNOSIS — M81.0 OSTEOPOROSIS, UNSPECIFIED OSTEOPOROSIS TYPE, UNSPECIFIED PATHOLOGICAL FRACTURE PRESENCE: ICD-10-CM

## 2024-09-27 PROCEDURE — 80053 COMPREHEN METABOLIC PANEL: CPT

## 2024-09-27 PROCEDURE — 36415 COLL VENOUS BLD VENIPUNCTURE: CPT

## 2024-09-28 LAB
ALBUMIN SERPL BCP-MCNC: 4.3 G/DL (ref 3.4–5)
ALP SERPL-CCNC: 71 U/L (ref 33–136)
ALT SERPL W P-5'-P-CCNC: 24 U/L (ref 10–52)
ANION GAP SERPL CALC-SCNC: 13 MMOL/L (ref 10–20)
AST SERPL W P-5'-P-CCNC: 26 U/L (ref 9–39)
BILIRUB SERPL-MCNC: 1 MG/DL (ref 0–1.2)
BUN SERPL-MCNC: 44 MG/DL (ref 6–23)
CALCIUM SERPL-MCNC: 9.8 MG/DL (ref 8.6–10.6)
CHLORIDE SERPL-SCNC: 110 MMOL/L (ref 98–107)
CO2 SERPL-SCNC: 23 MMOL/L (ref 21–32)
CREAT SERPL-MCNC: 1.22 MG/DL (ref 0.5–1.3)
EGFRCR SERPLBLD CKD-EPI 2021: 58 ML/MIN/1.73M*2
GLUCOSE SERPL-MCNC: 81 MG/DL (ref 74–99)
POTASSIUM SERPL-SCNC: 5.1 MMOL/L (ref 3.5–5.3)
PROT SERPL-MCNC: 7.1 G/DL (ref 6.4–8.2)
SODIUM SERPL-SCNC: 141 MMOL/L (ref 136–145)

## 2024-09-30 ENCOUNTER — APPOINTMENT (OUTPATIENT)
Dept: INFUSION THERAPY | Facility: CLINIC | Age: 84
End: 2024-09-30
Payer: MEDICARE

## 2024-09-30 VITALS
DIASTOLIC BLOOD PRESSURE: 59 MMHG | HEART RATE: 68 BPM | SYSTOLIC BLOOD PRESSURE: 127 MMHG | RESPIRATION RATE: 16 BRPM | OXYGEN SATURATION: 97 % | TEMPERATURE: 97.3 F

## 2024-09-30 DIAGNOSIS — M81.0 OSTEOPOROSIS, UNSPECIFIED OSTEOPOROSIS TYPE, UNSPECIFIED PATHOLOGICAL FRACTURE PRESENCE: ICD-10-CM

## 2024-09-30 PROCEDURE — 96372 THER/PROPH/DIAG INJ SC/IM: CPT | Performed by: NURSE PRACTITIONER

## 2024-09-30 RX ORDER — EPINEPHRINE 0.3 MG/.3ML
0.3 INJECTION SUBCUTANEOUS EVERY 5 MIN PRN
OUTPATIENT
Start: 2025-03-26

## 2024-09-30 RX ORDER — ALBUTEROL SULFATE 0.83 MG/ML
3 SOLUTION RESPIRATORY (INHALATION) AS NEEDED
OUTPATIENT
Start: 2025-03-26

## 2024-09-30 RX ORDER — FAMOTIDINE 10 MG/ML
20 INJECTION INTRAVENOUS ONCE AS NEEDED
OUTPATIENT
Start: 2025-03-26

## 2024-09-30 RX ORDER — DIPHENHYDRAMINE HYDROCHLORIDE 50 MG/ML
50 INJECTION INTRAMUSCULAR; INTRAVENOUS AS NEEDED
OUTPATIENT
Start: 2025-03-26

## 2024-09-30 ASSESSMENT — ENCOUNTER SYMPTOMS
COUGH: 0
SHORTNESS OF BREATH: 0
LEG SWELLING: 0
EXTREMITY WEAKNESS: 0
LIGHT-HEADEDNESS: 0
WOUND: 0
NUMBNESS: 0
WHEEZING: 0
PALPITATIONS: 0
DIZZINESS: 0

## 2024-09-30 NOTE — PROGRESS NOTES
[facility]   Infusion Clinic Note   Date: 2024   Name: Israel Franks  : 1940   MRN: [id number]          Reason for Visit: Injections (Every 6 months Prolia 60mg subcutaneous injection)         Today: We administered denosumab.       Visit Type: INJECTION       Ordered By: Gio Leroy MD       Accompanied by: Self       Diagnosis: Osteoporosis, unspecified osteoporosis type, unspecified pathological fracture presence        Allergies:   Allergies as of 2024    (No Known Allergies)          Current Medications has a current medication list which includes the following prescription(s): atorvastatin, calcium citrate/vitamin d3, denosumab, edarbi, hydrocortisone-pramoxine, levothyroxine, metoprolol succinate xl, tamsulosin, and icaps areds.       Vitals:   Vitals:    24 1256   BP: 127/59   Pulse: 68   Resp: 16   Temp: 36.3 °C (97.3 °F)   TempSrc: Temporal   SpO2: 97%             Infusion Pre-procedure Checklist:   - Allergies reviewed: yes   - Medications reviewed: yes       - Previous reaction to current treatment: no      Assess patient for the concerns below. Document provider notification as appropriate.  - Active or recent infection with/without current antibiotic use: no  - Recent or planned invasive dental work: no  - Recent or planned surgeries: no  - Recently received or plans to receive vaccinations: Planning on receiving the Flu and Covid, instructed to wait at least 2 weeks, verbalizes understanding  - Has treatment related toxicities: no  - Is pregnant:  n/a      Pain: 0   - Is the pain different from normal: n/a   - Is your Doctor aware:  n/a                Fall Risk Screening: Jenniffer Fall Risk  History of Falling, Immediate or Within 3 Months: No  Ambulatory Aid: Walks without aid/bedrest/nurse assist  Intravenous Therapy/Heparin Lock: No  Gait/Transferring: Normal/bedrest/immobile  Mental Status: Oriented to own ability       Review Of Systems:  Review  of Systems   Respiratory:  Negative for cough, shortness of breath and wheezing.    Cardiovascular:  Negative for chest pain, leg swelling and palpitations.   Skin:  Negative for itching, rash and wound.   Neurological:  Negative for dizziness, extremity weakness, light-headedness and numbness.         ROS completed? Yes      Infusion Readiness:  - Assessment Concerns Related to Infusion: No  - Provider notified: n/a      Document Below Only If Indicated:   New Patient Education:    N/A (returning patient for continuation of therapy. Ongoing education provided as needed.)  Received Prolia prior, new to the [facility]      Treatment Conditions & Drug Specific Questions:    Denosumab  (PROLIA. XGEVA)    (Unless otherwise specified on patient specific therapy plan):     TREATMENT CONDITIONS:  Unless otherwise specified on patient specific therapy plan HOLD and notify provider prior to proceeding with today's injection if patients:  o Calcium is LESS THAN 8.6 mg/dL OR  Ionized Calcium LESS THAN 1.1 mmol/L  o Recent or planned invasive dental procedure (within 4 weeks)    Lab Results   Component Value Date    CALCIUM 9.8 09/27/2024    PHOS 3.2 02/14/2019      Labs reviewed and patient meets treatment conditions? Yes    DRUG SPECIFIC QUESTIONS:  Is the patient taking calcium and vitamin D? Yes  (Recommended)    Pt Instructed on following risks: (1) hypocalcemia, (2) osteonecrosis of the jaw, (3) atypical femoral fractures, (4) serious infections, and (5) dermatologic reactions?  Yes      REMINDER:  PREGNANCY CATEGORY X DRUG. OBTAIN NEGTATIVE PREGNANCY TEST PRIOR TO FIRST INFUSION FOR WOMEN OF CHILDBEARING ABILITY   REMS DRUG    Recommended Vitals/Observation:  Vitals: Obtain vitals prior to injection.  Observation: Patient may leave immediately following injection.        Weight Based Drug Calculations:    WEIGHT BASED DRUGS: NOT APPLICABLE / FLAT DOSE          Initiated By: Tiffany Conner LPN

## 2024-10-06 DIAGNOSIS — E03.9 ACQUIRED HYPOTHYROIDISM: ICD-10-CM

## 2024-10-07 RX ORDER — LEVOTHYROXINE SODIUM 88 UG/1
88 TABLET ORAL DAILY
Qty: 90 TABLET | Refills: 0 | Status: SHIPPED | OUTPATIENT
Start: 2024-10-07

## 2024-10-14 ENCOUNTER — LAB (OUTPATIENT)
Dept: LAB | Facility: LAB | Age: 84
End: 2024-10-14
Payer: MEDICARE

## 2024-10-14 DIAGNOSIS — R97.20 ABNORMAL PSA: ICD-10-CM

## 2024-10-14 LAB — PSA SERPL-MCNC: 7.43 NG/ML

## 2024-10-14 PROCEDURE — 84153 ASSAY OF PSA TOTAL: CPT

## 2024-10-14 PROCEDURE — 36415 COLL VENOUS BLD VENIPUNCTURE: CPT

## 2024-10-14 NOTE — PROGRESS NOTES
Subjective     This visit was completed via telemedicine. All issues as below were discussed and addressed but no physical exam was performed unless allowed by visual confirmation. If it was felt that the patient should be evaluated in clinic, then they were directed there. Patient verbally consented to visit.      Israel Franks is a 84 y.o. male  with history of elevated PSA and BPH controlled with Tamsulosin. Presents today for follow up of his trend of rising PSA. Patient has no new complaints.        Past Medical History:   Diagnosis Date    Epigastric pain 12/28/2016    Abdominal pain, epigastric    Finger injury 06/12/2024    Gastrojejunal ulcer, unspecified as acute or chronic, without hemorrhage or perforation     Jejunal ulcer    Gilbert syndrome     Gilbert syndrome    Hyperlipidemia     Hypertension     Iron deficiency anemia secondary to blood loss (chronic)     Iron deficiency anemia due to chronic blood loss    Iron deficiency anemia secondary to blood loss (chronic) 12/27/2016    Anemia due to blood loss    Other long term (current) drug therapy 06/17/2016    High risk medication use    Personal history of (healed) traumatic fracture     History of fracture of left hip    Tinnitus, right ear 06/17/2016    Tinnitus of right ear     Past Surgical History:   Procedure Laterality Date    CHOLECYSTECTOMY  04/15/2014    Cholecystectomy    COLONOSCOPY  06/09/2015    Complete Colonoscopy    OTHER SURGICAL HISTORY  04/15/2014    Partial Gastrectomy [place]th II    US GUIDED ABSCESS DRAIN  7/6/2023    US GUIDED ABSCESS DRAIN 7/6/2023 [facility]     Family History   Problem Relation Name Age of Onset    Hypertension Father      Colon cancer Sister       Current Outpatient Medications   Medication Sig Dispense Refill    atorvastatin (Lipitor) 10 mg tablet Take 1 tablet (10 mg) by mouth once daily. 90 tablet 0    calcium citrate/vitamin D3 (CITRACAL REGULAR ORAL) Take by mouth.      denosumab (Prolia) 60 mg/mL syringe  Inject 1 mL (60 mg total) under the skin every 6 months. 1 mL 1    Edarbi 40 mg tablet TAKE 1 TABLET BY MOUTH DAILY 90 tablet 3    hydrocortisone-pramoxine (Analpram-HC) 1-1 % rectal cream Insert into the rectum 2 times a day. (Patient not taking: Reported on 2024) 30 g 0    levothyroxine (Synthroid, Levoxyl) 88 mcg tablet TAKE 1 TABLET BY MOUTH ONCE  DAILY 90 tablet 0    metoprolol succinate XL (Toprol-XL) 50 mg 24 hr tablet TAKE 1 TABLET BY MOUTH DAILY 90 tablet 1    tamsulosin (Flomax) 0.4 mg 24 hr capsule TAKE 1 CAPSULE BY MOUTH EVERY DAY 90 capsule 0    vitamins A,C,E-zinc-copper (ICaps AREDS) 4,296 mcg-226 mg-90 mg capsule Take by mouth.       No current facility-administered medications for this visit.     No Known Allergies  Social History     Socioeconomic History    Marital status: Single     Spouse name: Not on file    Number of children: Not on file    Years of education: Not on file    Highest education level: Not on file   Occupational History    Not on file   Tobacco Use    Smoking status: Former     Types: Cigarettes     Start date:      Quit date:      Years since quittin.8    Smokeless tobacco: Never   Substance and Sexual Activity    Alcohol use: Not Currently     Alcohol/week: 3.0 standard drinks of alcohol     Types: 3 Glasses of wine per week    Drug use: Never    Sexual activity: Not on file   Other Topics Concern    Not on file   Social History Narrative    Not on file     Social Determinants of Health     Financial Resource Strain: Not on file   Food Insecurity: Not on file   Transportation Needs: Not on file   Physical Activity: Not on file   Stress: Not on file   Social Connections: Not on file   Intimate Partner Violence: Not on file   Housing Stability: Not on file       Review of Systems  Pertinent items are noted in HPI.    Objective       Lab Review  Lab Results   Component Value Date    WBC 8.4 2024    RBC 4.03 (L) 2024    HGB 12.7 (L) 2024    HCT  40.2 (L) 03/08/2024     03/08/2024      Lab Results   Component Value Date    BUN 44 (H) 09/27/2024    CREATININE 1.22 09/27/2024        Lab Results   Component Value Date    PSA 7.43 (H) 10/14/2024    PSA 7.23 (H) 03/08/2024    PSA 4.68 (H) 01/06/2023           Assessment/Plan   There are no diagnoses linked to this encounter.  Elevated PSA     PSA is 7.43 on 10/14/2024, stable.    We will check PSA annually and follow up virtually to review.       All questions were answered to the patient's satisfaction. Patient agrees with the plan and wishes to proceed. Follow-up will be scheduled appropriately.     E&M visit today is associated with current or anticipated ongoing medical care services related to a patient's single, serious condition or a complex condition.    Scribed for Dr. Markham by Savanna Morgan. I , Dr Markham, have personally reviewed and agreed with the information entered by the Virtual Scribe.

## 2024-10-15 ENCOUNTER — APPOINTMENT (OUTPATIENT)
Dept: RHEUMATOLOGY | Facility: CLINIC | Age: 84
End: 2024-10-15
Payer: MEDICARE

## 2024-10-15 ENCOUNTER — APPOINTMENT (OUTPATIENT)
Dept: UROLOGY | Facility: CLINIC | Age: 84
End: 2024-10-15
Payer: MEDICARE

## 2024-10-15 DIAGNOSIS — R97.20 ELEVATED PSA: Primary | ICD-10-CM

## 2024-10-15 PROCEDURE — G2211 COMPLEX E/M VISIT ADD ON: HCPCS | Performed by: UROLOGY

## 2024-10-15 PROCEDURE — 1123F ACP DISCUSS/DSCN MKR DOCD: CPT | Performed by: UROLOGY

## 2024-10-15 PROCEDURE — 99213 OFFICE O/P EST LOW 20 MIN: CPT | Performed by: UROLOGY

## 2024-10-28 ENCOUNTER — APPOINTMENT (OUTPATIENT)
Dept: PRIMARY CARE | Facility: CLINIC | Age: 84
End: 2024-10-28
Payer: MEDICARE

## 2024-11-06 ENCOUNTER — APPOINTMENT (OUTPATIENT)
Dept: PRIMARY CARE | Facility: CLINIC | Age: 84
End: 2024-11-06
Payer: MEDICARE

## 2024-11-06 VITALS
WEIGHT: 112 LBS | BODY MASS INDEX: 15.68 KG/M2 | OXYGEN SATURATION: 99 % | HEART RATE: 75 BPM | RESPIRATION RATE: 18 BRPM | SYSTOLIC BLOOD PRESSURE: 140 MMHG | DIASTOLIC BLOOD PRESSURE: 75 MMHG | HEIGHT: 71 IN

## 2024-11-06 DIAGNOSIS — E03.9 ACQUIRED HYPOTHYROIDISM: ICD-10-CM

## 2024-11-06 DIAGNOSIS — Z23 FLU VACCINE NEED: ICD-10-CM

## 2024-11-06 DIAGNOSIS — I10 BENIGN ESSENTIAL HYPERTENSION: Primary | ICD-10-CM

## 2024-11-06 DIAGNOSIS — E78.49 OTHER HYPERLIPIDEMIA: ICD-10-CM

## 2024-11-06 DIAGNOSIS — D53.1 MEGALOBLASTIC ANEMIA: ICD-10-CM

## 2024-11-06 DIAGNOSIS — R97.20 ABNORMAL PSA: ICD-10-CM

## 2024-11-06 DIAGNOSIS — R35.0 FREQUENCY OF MICTURITION: ICD-10-CM

## 2024-11-06 DIAGNOSIS — N18.2 CHRONIC RENAL IMPAIRMENT, STAGE 2 (MILD): ICD-10-CM

## 2024-11-06 DIAGNOSIS — N40.1 BENIGN PROSTATIC HYPERPLASIA WITH LOWER URINARY TRACT SYMPTOMS: ICD-10-CM

## 2024-11-06 PROBLEM — M54.50 LOW BACK PAIN, UNSPECIFIED: Status: ACTIVE | Noted: 2024-06-06

## 2024-11-06 PROCEDURE — 1159F MED LIST DOCD IN RCRD: CPT | Performed by: INTERNAL MEDICINE

## 2024-11-06 PROCEDURE — 3078F DIAST BP <80 MM HG: CPT | Performed by: INTERNAL MEDICINE

## 2024-11-06 PROCEDURE — 99214 OFFICE O/P EST MOD 30 MIN: CPT | Performed by: INTERNAL MEDICINE

## 2024-11-06 PROCEDURE — 1123F ACP DISCUSS/DSCN MKR DOCD: CPT | Performed by: INTERNAL MEDICINE

## 2024-11-06 PROCEDURE — G0008 ADMIN INFLUENZA VIRUS VAC: HCPCS | Performed by: INTERNAL MEDICINE

## 2024-11-06 PROCEDURE — G2211 COMPLEX E/M VISIT ADD ON: HCPCS | Performed by: INTERNAL MEDICINE

## 2024-11-06 PROCEDURE — 90662 IIV NO PRSV INCREASED AG IM: CPT | Performed by: INTERNAL MEDICINE

## 2024-11-06 PROCEDURE — 3077F SYST BP >= 140 MM HG: CPT | Performed by: INTERNAL MEDICINE

## 2024-11-06 RX ORDER — TAMSULOSIN HYDROCHLORIDE 0.4 MG/1
0.4 CAPSULE ORAL DAILY
Qty: 90 CAPSULE | Refills: 0 | Status: SHIPPED | OUTPATIENT
Start: 2024-11-06

## 2024-11-06 ASSESSMENT — ENCOUNTER SYMPTOMS
GASTROINTESTINAL NEGATIVE: 1
CARDIOVASCULAR NEGATIVE: 1
RESPIRATORY NEGATIVE: 1
HEMATOLOGIC/LYMPHATIC NEGATIVE: 1
ENDOCRINE NEGATIVE: 1
CONSTITUTIONAL NEGATIVE: 1
EYES NEGATIVE: 1
ALLERGIC/IMMUNOLOGIC NEGATIVE: 1
PSYCHIATRIC NEGATIVE: 1
MUSCULOSKELETAL NEGATIVE: 1
NEUROLOGICAL NEGATIVE: 1

## 2024-11-06 NOTE — ASSESSMENT & PLAN NOTE
CRI - Chronic Renal Insuficiency. Secondary to DM/HTN/Atherosclerosis. Follow BUN/Cr. and lytes.   RENOPROTECTION with ACEI/ARB (). Monitor microalbuminuria. Avoid hypotension and renal hypoperfusion. The patient was instructed to avoid NSAIDS and educate compliance with medications to control HTN(hypertension) and cholesterol and diabetes.                               declines

## 2024-11-06 NOTE — PROGRESS NOTES
"Subjective   Patient ID: Israel Franks is a 84 y.o. male who presents for Follow-up (3 month follow up).    HPI     Review of Systems   Constitutional: Negative.    HENT: Negative.     Eyes: Negative.    Respiratory: Negative.     Cardiovascular: Negative.    Gastrointestinal: Negative.    Endocrine: Negative.    Musculoskeletal: Negative.    Skin: Negative.    Allergic/Immunologic: Negative.    Neurological: Negative.    Hematological: Negative.    Psychiatric/Behavioral: Negative.     All other systems reviewed and are negative.      Objective   Pulse 75   Resp 18   Ht 1.791 m (5' 10.5\")   Wt 50.8 kg (112 lb)   SpO2 99%   BMI 15.84 kg/m²   Blood pressure 140/75, pulse 75, resp. rate 18, height 1.791 m (5' 10.5\"), weight 50.8 kg (112 lb), SpO2 99%.   Physical Exam  Vitals and nursing note reviewed.   Constitutional:       Appearance: Normal appearance.   HENT:      Head: Normocephalic and atraumatic.      Right Ear: Tympanic membrane, ear canal and external ear normal.      Left Ear: Tympanic membrane, ear canal and external ear normal. There is no impacted cerumen.      Nose: Nose normal.      Mouth/Throat:      Mouth: Mucous membranes are moist.      Pharynx: Oropharynx is clear.   Eyes:      Extraocular Movements: Extraocular movements intact.      Conjunctiva/sclera: Conjunctivae normal.      Pupils: Pupils are equal, round, and reactive to light.   Cardiovascular:      Rate and Rhythm: Normal rate and regular rhythm.      Pulses: Normal pulses.      Heart sounds: Normal heart sounds. No murmur heard.  Pulmonary:      Effort: Pulmonary effort is normal. No respiratory distress.      Breath sounds: Normal breath sounds. No stridor. No wheezing, rhonchi or rales.   Chest:      Chest wall: No tenderness.   Abdominal:      General: Abdomen is flat. Bowel sounds are normal. There is no distension.      Palpations: Abdomen is soft. There is no mass.      Tenderness: There is no abdominal tenderness. There is no " right CVA tenderness, left CVA tenderness, guarding or rebound.      Hernia: No hernia is present.   Musculoskeletal:         General: Normal range of motion.      Cervical back: Normal range of motion and neck supple.   Skin:     General: Skin is warm.      Capillary Refill: Capillary refill takes less than 2 seconds.   Neurological:      General: No focal deficit present.      Mental Status: He is alert.      Cranial Nerves: No cranial nerve deficit.      Sensory: No sensory deficit.      Motor: No weakness.      Coordination: Coordination normal.      Gait: Gait normal.      Deep Tendon Reflexes: Reflexes normal.   Psychiatric:         Mood and Affect: Mood normal.         Behavior: Behavior normal. Behavior is cooperative.         Thought Content: Thought content normal.         Judgment: Judgment normal.         Assessment/Plan   Problem List Items Addressed This Visit             ICD-10-CM    Abnormal PSA R97.20     Monitor and follows the PSA and follows with urology            Benign essential hypertension - Primary I10     HTN - hypertension well/controlled .Target BP < 130/80  achieved. Educate low salt diet and exercise with weight loss. Educate home self monitoring and diary keeping. Educate risks of elevate blood pressure and benefits of prompt treatment.  Refill metoprolol and hydrochlorothiazide          Hypothyroidism E03.9     Hypothyroidism - Symptoms are  controlled (weight gain, fatigue, constipation, cold intolerance). Check TSH continues dose of Synthroid/Levothyroxine  of 88 mcg/qD.          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 Check CBC , B12 and Folic Acid levels, Iron/Ferritn/TIBC, refer patient to endoscopy (upper/ lower). Target HCT > 30 %. 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 Secondary to DM/HTN/Atherosclerosis. Follow BUN/Cr. and lytes.   RENOPROTECTION with ACEI/ARB (). Monitor  microalbuminuria. Avoid hypotension and renal hypoperfusion. The patient was instructed to avoid NSAIDS and educate compliance with medications to control HTN(hypertension) and cholesterol and diabetes.                                     Hyperlipidemia E78.5     Hypercholesterolemia - Monitor lipid profile and educate patient upon risks of high cholesterol and targets. Educate diet and change in lifestyle and increase in exercises - Refill:   and educate compliance with medication and diet.            Other Visit Diagnoses         Codes    Benign prostatic hyperplasia with lower urinary tract symptoms     N40.1    Relevant Medications    tamsulosin (Flomax) 0.4 mg 24 hr capsule    Frequency of micturition     R35.0    Relevant Medications    tamsulosin (Flomax) 0.4 mg 24 hr capsule               Abnormal prostate specific antigen (PSA) R97.20        Monitor and follows the PSA and follows with urology           Benign essential hypertension I10       HTN - hypertension well/controlled .Target BP < 130/80  achieved. Educate low salt diet and exercise with weight loss. Educate home self monitoring and diary keeping. Educate risks of elevate blood pressure and benefits of prompt treatment.  Refill metoprolol and hydrochlorothiazide            Dyslipidemia E78.5       Hypercholesterolemia - Monitor lipid profile and educate patient upon risks of high cholesterol and targets. Educate diet and change in lifestyle and increase in exercises - Refill: Atorvastatin     and educate compliance with medication and diet.             Greater trochanteric bursitis, right M70.61       Bursitis - of the Right Trochanteric Bursa. Tenderness to deep palpation of the R/L Trochanter. Recommend NSAIDS (Naprosin 500mg BID vs. Arthrotec 75mg BID), rest and mild exercises . Also consider  Injection of 120 mg of KENALOG along with 5cc of 2% Lidocaine into the bursa at the tender site. Procedure was tolerated well. Check X - ray of the Right   hip. Educate stretches and exercises            Hypothyroidism E03.9       Hypothyroidism - Symptoms are  controlled (weight gain, fatigue, constipation, cold intolerance). Check TSH continues dose of Synthroid/Levothyroxine  of 88 mcg/qD.

## 2024-12-14 DIAGNOSIS — I10 BENIGN ESSENTIAL HYPERTENSION: ICD-10-CM

## 2024-12-14 DIAGNOSIS — E03.9 ACQUIRED HYPOTHYROIDISM: ICD-10-CM

## 2024-12-16 RX ORDER — LEVOTHYROXINE SODIUM 88 UG/1
88 TABLET ORAL DAILY
Qty: 90 TABLET | Refills: 0 | Status: SHIPPED | OUTPATIENT
Start: 2024-12-16

## 2024-12-16 RX ORDER — AZILSARTAN KAMEDOXOMIL 40 MG/1
1 TABLET ORAL DAILY
Qty: 90 TABLET | Refills: 0 | Status: SHIPPED | OUTPATIENT
Start: 2024-12-16

## 2025-01-19 DIAGNOSIS — I10 BENIGN ESSENTIAL HYPERTENSION: ICD-10-CM

## 2025-01-20 RX ORDER — AZILSARTAN KAMEDOXOMIL 40 MG/1
1 TABLET ORAL DAILY
Qty: 30 TABLET | Refills: 2 | Status: SHIPPED | OUTPATIENT
Start: 2025-01-20

## 2025-01-23 ENCOUNTER — HOSPITAL ENCOUNTER (OUTPATIENT)
Dept: RADIOLOGY | Facility: CLINIC | Age: 85
Discharge: HOME | End: 2025-01-23
Payer: COMMERCIAL

## 2025-01-23 ENCOUNTER — OFFICE VISIT (OUTPATIENT)
Dept: URGENT CARE | Age: 85
End: 2025-01-23
Payer: COMMERCIAL

## 2025-01-23 VITALS
BODY MASS INDEX: 16.97 KG/M2 | WEIGHT: 120 LBS | OXYGEN SATURATION: 96 % | HEART RATE: 84 BPM | RESPIRATION RATE: 16 BRPM | DIASTOLIC BLOOD PRESSURE: 74 MMHG | SYSTOLIC BLOOD PRESSURE: 150 MMHG

## 2025-01-23 DIAGNOSIS — S89.91XA INJURY OF RIGHT KNEE, INITIAL ENCOUNTER: ICD-10-CM

## 2025-01-23 DIAGNOSIS — S89.91XA INJURY OF RIGHT KNEE, INITIAL ENCOUNTER: Primary | ICD-10-CM

## 2025-01-23 PROCEDURE — 73564 X-RAY EXAM KNEE 4 OR MORE: CPT | Mod: RT

## 2025-01-23 ASSESSMENT — PAIN SCALES - GENERAL: PAINLEVEL_OUTOF10: 0-NO PAIN

## 2025-01-23 NOTE — PROGRESS NOTES
HPI:  Patient states that a week ago he was walking into a house and his right foot slipped and he twisted his right knee.  Pt states that swelling started in the knee right after injury. Pt states that he has some pain with weight bearing.  Pt states that swelling has decreased and the knee feels better now.  Pt does not recall previous hx/o injury to the knee.             ROS:  No numbness/weakness  No ankle pain    PE:    A&O x3  NCAT  Normal respiratory effort  +swelling/tndop over right anterior knee  +pain with right knee rotation and flexion/extension  No focal deficit  Judgement normal    Results:  Xray right knee: initial read no fx; arthritic changes; +swelling    A/P:   Right knee injury  Right knee pain    We will call you with xray results if you need further treatment.  Ice.  Elevate.  Rest.  Wrap.  Tylenol as needed for pain.  Follow up with orthopedist for further evaluation.  Keep a diary of symptoms.  Go to the ER if starts getting worse.

## 2025-01-28 ENCOUNTER — OFFICE VISIT (OUTPATIENT)
Dept: ORTHOPEDIC SURGERY | Facility: CLINIC | Age: 85
End: 2025-01-28
Payer: COMMERCIAL

## 2025-01-28 ENCOUNTER — APPOINTMENT (OUTPATIENT)
Dept: ORTHOPEDIC SURGERY | Facility: CLINIC | Age: 85
End: 2025-01-28
Payer: COMMERCIAL

## 2025-01-28 DIAGNOSIS — S76.111A QUADRICEPS TENDON RUPTURE, RIGHT, INITIAL ENCOUNTER: ICD-10-CM

## 2025-01-28 DIAGNOSIS — S89.91XA INJURY OF RIGHT KNEE, INITIAL ENCOUNTER: ICD-10-CM

## 2025-01-28 PROCEDURE — L1833 KO ADJ JNT POS R SUP PRE OTS: HCPCS

## 2025-01-28 PROCEDURE — 1123F ACP DISCUSS/DSCN MKR DOCD: CPT

## 2025-01-28 PROCEDURE — 99204 OFFICE O/P NEW MOD 45 MIN: CPT

## 2025-01-28 NOTE — PROGRESS NOTES
Israel Franks  is a 85 y.o. year-old  male. he  is a new patient to our office and presents with a chief complaint of Right  knee injury. States that on 1/16 he slipped while walking into his house, twisted on the knee and fell landing on his bottom. Had immediate swelling to the knee, minimal pain. He was seen at , xrays negative for fracture, told to follow up with ortho. Notes inability to straighten his leg. Mild pain medially. Notes instability to the knee when walking. No prior knee injury or surgery.       Past Medical, Family, and Social History reviewed   Review of Systems  A complete review of systems was conducted, pertinent only to the HPI noted above.  Constitutional: None  Eyes: No additions to above history  Ears, Nose, Throat: No additions to above history  Cardiovascular: No additions to above history  Respiratory: No additions to above history  GI: No additions to above history  : No additions to above history  Skin/Neuro: No additions to above history  Endocrine/Heme/Lymph: No additions to above history  Immunologic: No additions to above history  Psychiatric: No additions to above history  Musculoskeletal: see above    GEN: Alert and Oriented x 3  Constitutional: Well appearing , in no apparent distress.  Skin: No rashes, erythema, or induration around knee    MUSCULOSKELETAL EXAM:     Right KNEE:  ROM:  [0]/ [0] / 125  Effusion: + large  Alignment: [neutral]      Gait: [normal]  Sensation intact bilaterally sural/saphenous/sp/dp/tibial nerve bilaterally  Motor 5/5 knee flexion/extension/foot DF/PF/EHL/FHL bilaterally  Palpable/symmetric DP and PT pulse bilaterally      PATELLAR/EXTENSOR MECHANISM:   KNEE:  Patellar Crepitus: n  Patellar Compression Pain:n  Patellar Apprehension: [no]  Extensor Mechanism: unable to perform straight leg raise, extensor lag.  Straight Leg Raise: unable to perform       LIGAMENTS:  ACL: Lachmans: [negative]  PCL: [stable]  Valgus at 0 degrees: [stable]  Valgus at  30 degrees: [stable]  Varus at 0 degrees: [stable]  Varus at 30 degrees: [stable]    MENISCUS EXAM:  Joint Line Tenderness:medial joint line tenderness  McMurrays: [negative]  Pain with Deep Flexion: [No]    Xrays independently viewed and interpreted: mild tricompartmental DJD. Arthrosclerosis within vessels noted. Large effusion. No fracture or dislocation    Patient was prescribed a [tscope] for [quad tendon rupture].The patient is ambulatory with or without aid; but, has weakness, instability and/or deformity of their [right] [extremity] which requires stabilization from this orthosis to improve their function. Verbal and written instructions for the use, wear schedule, cleaning and application of this item were given.  Patient was instructed that should the brace result in increased pain, decreased sensation, increased swelling, or an overall worsening of their medical condition, to please contact our office immediately. Orthotic management and training was provided for skin care, modifications due to healing tissues, edema changes, interruption in skin integrity, and safety precautions with the orthosis.    PLAN:  Discussed diagnosis with patient with likely distal quad tendon rupture. Ordered a STAT MRI to evaluate this. He was placed into a Tscope locked in extension. He did drive himself here, it was highly advised to him to have him get someone to drive him home. Discussed with him it is not recommend he drive in the brace and with his knee given his injury. He will need to get medical clearance prior to repair. Surgical date tentative on clearance and availability, will look at getting date of  2/12 at [facility]. Will call him with MRI read. All questions answered, patient in agreement with plan.    67

## 2025-01-29 DIAGNOSIS — I10 PRIMARY HYPERTENSION: ICD-10-CM

## 2025-01-29 RX ORDER — METOPROLOL SUCCINATE 50 MG/1
50 TABLET, EXTENDED RELEASE ORAL DAILY
Qty: 90 TABLET | Refills: 1 | Status: SHIPPED | OUTPATIENT
Start: 2025-01-29

## 2025-01-30 ENCOUNTER — TELEPHONE (OUTPATIENT)
Dept: ORTHOPEDIC SURGERY | Facility: CLINIC | Age: 85
End: 2025-01-30
Payer: COMMERCIAL

## 2025-01-31 ENCOUNTER — HOSPITAL ENCOUNTER (OUTPATIENT)
Dept: RADIOLOGY | Facility: CLINIC | Age: 85
Discharge: HOME | End: 2025-01-31
Payer: COMMERCIAL

## 2025-01-31 DIAGNOSIS — S76.111A QUADRICEPS TENDON RUPTURE, RIGHT, INITIAL ENCOUNTER: ICD-10-CM

## 2025-01-31 PROCEDURE — 73721 MRI JNT OF LWR EXTRE W/O DYE: CPT | Mod: RT

## 2025-02-04 ENCOUNTER — APPOINTMENT (OUTPATIENT)
Dept: CARDIOLOGY | Facility: CLINIC | Age: 85
End: 2025-02-04
Payer: COMMERCIAL

## 2025-02-04 DIAGNOSIS — N40.1 BENIGN PROSTATIC HYPERPLASIA WITH LOWER URINARY TRACT SYMPTOMS: ICD-10-CM

## 2025-02-04 DIAGNOSIS — R35.0 FREQUENCY OF MICTURITION: ICD-10-CM

## 2025-02-04 RX ORDER — TAMSULOSIN HYDROCHLORIDE 0.4 MG/1
0.4 CAPSULE ORAL DAILY
Qty: 90 CAPSULE | Refills: 0 | Status: ON HOLD | OUTPATIENT
Start: 2025-02-04

## 2025-02-05 ENCOUNTER — APPOINTMENT (OUTPATIENT)
Dept: PRIMARY CARE | Facility: CLINIC | Age: 85
End: 2025-02-05
Payer: MEDICARE

## 2025-02-07 ENCOUNTER — OFFICE VISIT (OUTPATIENT)
Dept: ORTHOPEDIC SURGERY | Facility: CLINIC | Age: 85
End: 2025-02-07
Payer: COMMERCIAL

## 2025-02-07 DIAGNOSIS — S76.111A QUADRICEPS TENDON RUPTURE, RIGHT, INITIAL ENCOUNTER: Primary | ICD-10-CM

## 2025-02-07 PROCEDURE — 1123F ACP DISCUSS/DSCN MKR DOCD: CPT | Performed by: STUDENT IN AN ORGANIZED HEALTH CARE EDUCATION/TRAINING PROGRAM

## 2025-02-07 PROCEDURE — 1036F TOBACCO NON-USER: CPT | Performed by: STUDENT IN AN ORGANIZED HEALTH CARE EDUCATION/TRAINING PROGRAM

## 2025-02-07 PROCEDURE — 99214 OFFICE O/P EST MOD 30 MIN: CPT | Performed by: STUDENT IN AN ORGANIZED HEALTH CARE EDUCATION/TRAINING PROGRAM

## 2025-02-07 PROCEDURE — 1159F MED LIST DOCD IN RCRD: CPT | Performed by: STUDENT IN AN ORGANIZED HEALTH CARE EDUCATION/TRAINING PROGRAM

## 2025-02-09 ENCOUNTER — PREP FOR PROCEDURE (OUTPATIENT)
Dept: ORTHOPEDIC SURGERY | Facility: CLINIC | Age: 85
End: 2025-02-09

## 2025-02-09 ENCOUNTER — APPOINTMENT (OUTPATIENT)
Dept: RADIOLOGY | Facility: HOSPITAL | Age: 85
DRG: 500 | End: 2025-02-09
Payer: COMMERCIAL

## 2025-02-09 ENCOUNTER — APPOINTMENT (OUTPATIENT)
Dept: CARDIOLOGY | Facility: HOSPITAL | Age: 85
DRG: 500 | End: 2025-02-09
Payer: COMMERCIAL

## 2025-02-09 ENCOUNTER — HOSPITAL ENCOUNTER (INPATIENT)
Facility: HOSPITAL | Age: 85
End: 2025-02-09
Attending: EMERGENCY MEDICINE | Admitting: INTERNAL MEDICINE
Payer: COMMERCIAL

## 2025-02-09 VITALS
SYSTOLIC BLOOD PRESSURE: 90 MMHG | WEIGHT: 120 LBS | TEMPERATURE: 98.2 F | HEART RATE: 70 BPM | RESPIRATION RATE: 18 BRPM | HEIGHT: 71 IN | BODY MASS INDEX: 16.8 KG/M2 | OXYGEN SATURATION: 96 % | DIASTOLIC BLOOD PRESSURE: 44 MMHG

## 2025-02-09 DIAGNOSIS — K59.00 CONSTIPATION, UNSPECIFIED CONSTIPATION TYPE: ICD-10-CM

## 2025-02-09 DIAGNOSIS — E78.5 DYSLIPIDEMIA: ICD-10-CM

## 2025-02-09 DIAGNOSIS — Z79.899 ON DEEP VEIN THROMBOSIS (DVT) PROPHYLAXIS: ICD-10-CM

## 2025-02-09 DIAGNOSIS — S76.111A RUPTURE OF RIGHT QUADRICEPS TENDON, INITIAL ENCOUNTER: Primary | ICD-10-CM

## 2025-02-09 DIAGNOSIS — K83.9 BILE LEAK: ICD-10-CM

## 2025-02-09 DIAGNOSIS — Z01.818 PRE-OP EVALUATION: ICD-10-CM

## 2025-02-09 DIAGNOSIS — I49.8 SINUS ARRHYTHMIA: ICD-10-CM

## 2025-02-09 DIAGNOSIS — R52 PAIN: ICD-10-CM

## 2025-02-09 DIAGNOSIS — I35.0 NONRHEUMATIC AORTIC VALVE STENOSIS: ICD-10-CM

## 2025-02-09 LAB
ALBUMIN SERPL BCP-MCNC: 4.1 G/DL (ref 3.4–5)
ALP SERPL-CCNC: 81 U/L (ref 33–136)
ALT SERPL W P-5'-P-CCNC: 25 U/L (ref 10–52)
ANION GAP SERPL CALC-SCNC: 13 MMOL/L (ref 10–20)
APPEARANCE UR: CLEAR
AST SERPL W P-5'-P-CCNC: 29 U/L (ref 9–39)
BASOPHILS # BLD AUTO: 0.07 X10*3/UL (ref 0–0.1)
BASOPHILS NFR BLD AUTO: 0.8 %
BILIRUB DIRECT SERPL-MCNC: 0.2 MG/DL (ref 0–0.3)
BILIRUB SERPL-MCNC: 1.1 MG/DL (ref 0–1.2)
BILIRUB UR STRIP.AUTO-MCNC: NEGATIVE MG/DL
BUN SERPL-MCNC: 38 MG/DL (ref 6–23)
CALCIUM SERPL-MCNC: 10 MG/DL (ref 8.6–10.3)
CHLORIDE SERPL-SCNC: 105 MMOL/L (ref 98–107)
CO2 SERPL-SCNC: 26 MMOL/L (ref 21–32)
COLOR UR: YELLOW
CREAT SERPL-MCNC: 1.26 MG/DL (ref 0.5–1.3)
EGFRCR SERPLBLD CKD-EPI 2021: 56 ML/MIN/1.73M*2
EOSINOPHIL # BLD AUTO: 0 X10*3/UL (ref 0–0.4)
EOSINOPHIL NFR BLD AUTO: 0 %
ERYTHROCYTE [DISTWIDTH] IN BLOOD BY AUTOMATED COUNT: 12.8 % (ref 11.5–14.5)
GLUCOSE SERPL-MCNC: 95 MG/DL (ref 74–99)
GLUCOSE UR STRIP.AUTO-MCNC: NORMAL MG/DL
HCT VFR BLD AUTO: 35.7 % (ref 41–52)
HGB BLD-MCNC: 11.5 G/DL (ref 13.5–17.5)
IMM GRANULOCYTES # BLD AUTO: 0.03 X10*3/UL (ref 0–0.5)
IMM GRANULOCYTES NFR BLD AUTO: 0.3 % (ref 0–0.9)
INR PPP: 1 (ref 0.9–1.1)
KETONES UR STRIP.AUTO-MCNC: NEGATIVE MG/DL
LEUKOCYTE ESTERASE UR QL STRIP.AUTO: NEGATIVE
LYMPHOCYTES # BLD AUTO: 1.13 X10*3/UL (ref 0.8–3)
LYMPHOCYTES NFR BLD AUTO: 13.1 %
MCH RBC QN AUTO: 32.1 PG (ref 26–34)
MCHC RBC AUTO-ENTMCNC: 32.2 G/DL (ref 32–36)
MCV RBC AUTO: 100 FL (ref 80–100)
MONOCYTES # BLD AUTO: 0.79 X10*3/UL (ref 0.05–0.8)
MONOCYTES NFR BLD AUTO: 9.2 %
NEUTROPHILS # BLD AUTO: 6.58 X10*3/UL (ref 1.6–5.5)
NEUTROPHILS NFR BLD AUTO: 76.6 %
NITRITE UR QL STRIP.AUTO: NEGATIVE
NRBC BLD-RTO: 0 /100 WBCS (ref 0–0)
PH UR STRIP.AUTO: 5 [PH]
PLATELET # BLD AUTO: 257 X10*3/UL (ref 150–450)
POTASSIUM SERPL-SCNC: 4.5 MMOL/L (ref 3.5–5.3)
PROT SERPL-MCNC: 7.5 G/DL (ref 6.4–8.2)
PROT UR STRIP.AUTO-MCNC: NEGATIVE MG/DL
PROTHROMBIN TIME: 11.5 SECONDS (ref 9.8–12.8)
RBC # BLD AUTO: 3.58 X10*6/UL (ref 4.5–5.9)
RBC # UR STRIP.AUTO: NEGATIVE MG/DL
SODIUM SERPL-SCNC: 139 MMOL/L (ref 136–145)
SP GR UR STRIP.AUTO: 1.02
TSH SERPL-ACNC: 4.71 MIU/L (ref 0.44–3.98)
UROBILINOGEN UR STRIP.AUTO-MCNC: NORMAL MG/DL
WBC # BLD AUTO: 8.6 X10*3/UL (ref 4.4–11.3)

## 2025-02-09 PROCEDURE — 85610 PROTHROMBIN TIME: CPT | Performed by: EMERGENCY MEDICINE

## 2025-02-09 PROCEDURE — 2500000001 HC RX 250 WO HCPCS SELF ADMINISTERED DRUGS (ALT 637 FOR MEDICARE OP)

## 2025-02-09 PROCEDURE — 82248 BILIRUBIN DIRECT: CPT | Performed by: EMERGENCY MEDICINE

## 2025-02-09 PROCEDURE — 71045 X-RAY EXAM CHEST 1 VIEW: CPT | Performed by: RADIOLOGY

## 2025-02-09 PROCEDURE — 36415 COLL VENOUS BLD VENIPUNCTURE: CPT | Performed by: EMERGENCY MEDICINE

## 2025-02-09 PROCEDURE — 99285 EMERGENCY DEPT VISIT HI MDM: CPT | Performed by: EMERGENCY MEDICINE

## 2025-02-09 PROCEDURE — 84443 ASSAY THYROID STIM HORMONE: CPT

## 2025-02-09 PROCEDURE — 1200000002 HC GENERAL ROOM WITH TELEMETRY DAILY

## 2025-02-09 PROCEDURE — 99222 1ST HOSP IP/OBS MODERATE 55: CPT | Performed by: STUDENT IN AN ORGANIZED HEALTH CARE EDUCATION/TRAINING PROGRAM

## 2025-02-09 PROCEDURE — 93010 ELECTROCARDIOGRAM REPORT: CPT | Performed by: STUDENT IN AN ORGANIZED HEALTH CARE EDUCATION/TRAINING PROGRAM

## 2025-02-09 PROCEDURE — 82374 ASSAY BLOOD CARBON DIOXIDE: CPT | Performed by: EMERGENCY MEDICINE

## 2025-02-09 PROCEDURE — 2500000002 HC RX 250 W HCPCS SELF ADMINISTERED DRUGS (ALT 637 FOR MEDICARE OP, ALT 636 FOR OP/ED)

## 2025-02-09 PROCEDURE — 85025 COMPLETE CBC W/AUTO DIFF WBC: CPT | Performed by: EMERGENCY MEDICINE

## 2025-02-09 PROCEDURE — 2500000004 HC RX 250 GENERAL PHARMACY W/ HCPCS (ALT 636 FOR OP/ED)

## 2025-02-09 PROCEDURE — 81003 URINALYSIS AUTO W/O SCOPE: CPT

## 2025-02-09 PROCEDURE — 93005 ELECTROCARDIOGRAM TRACING: CPT

## 2025-02-09 PROCEDURE — 71045 X-RAY EXAM CHEST 1 VIEW: CPT

## 2025-02-09 RX ORDER — ONDANSETRON HYDROCHLORIDE 2 MG/ML
4 INJECTION, SOLUTION INTRAVENOUS EVERY 8 HOURS PRN
Status: ACTIVE | OUTPATIENT
Start: 2025-02-09

## 2025-02-09 RX ORDER — ONDANSETRON 4 MG/1
4 TABLET, FILM COATED ORAL EVERY 8 HOURS PRN
Status: ACTIVE | OUTPATIENT
Start: 2025-02-09

## 2025-02-09 RX ORDER — ATORVASTATIN CALCIUM 10 MG/1
10 TABLET, FILM COATED ORAL NIGHTLY
Status: DISPENSED | OUTPATIENT
Start: 2025-02-09

## 2025-02-09 RX ORDER — TAMSULOSIN HYDROCHLORIDE 0.4 MG/1
0.4 CAPSULE ORAL DAILY
Status: DISPENSED | OUTPATIENT
Start: 2025-02-09

## 2025-02-09 RX ORDER — MULTIVIT-MIN/IRON FUM/FOLIC AC 7.5 MG-4
1 TABLET ORAL
Status: ON HOLD | COMMUNITY

## 2025-02-09 RX ORDER — LOSARTAN POTASSIUM 50 MG/1
50 TABLET ORAL DAILY
Status: ACTIVE | OUTPATIENT
Start: 2025-02-09

## 2025-02-09 RX ORDER — MORPHINE SULFATE 2 MG/ML
2 INJECTION, SOLUTION INTRAMUSCULAR; INTRAVENOUS EVERY 4 HOURS PRN
Status: ACTIVE | OUTPATIENT
Start: 2025-02-09

## 2025-02-09 RX ORDER — HEPARIN SODIUM 5000 [USP'U]/ML
5000 INJECTION, SOLUTION INTRAVENOUS; SUBCUTANEOUS EVERY 8 HOURS
Status: ACTIVE | OUTPATIENT
Start: 2025-02-09

## 2025-02-09 RX ORDER — METOPROLOL SUCCINATE 25 MG/1
25 TABLET, EXTENDED RELEASE ORAL DAILY
Status: ACTIVE | OUTPATIENT
Start: 2025-02-10

## 2025-02-09 RX ORDER — FERROUS SULFATE 325(65) MG
325 TABLET ORAL
Status: DISPENSED | OUTPATIENT
Start: 2025-02-09

## 2025-02-09 RX ORDER — POLYETHYLENE GLYCOL 3350 17 G/17G
17 POWDER, FOR SOLUTION ORAL DAILY
Status: DISPENSED | OUTPATIENT
Start: 2025-02-09

## 2025-02-09 RX ORDER — ACETAMINOPHEN 650 MG/1
650 SUPPOSITORY RECTAL EVERY 4 HOURS PRN
Status: ACTIVE | OUTPATIENT
Start: 2025-02-09

## 2025-02-09 RX ORDER — LEVOTHYROXINE SODIUM 88 UG/1
88 TABLET ORAL DAILY
Status: ACTIVE | OUTPATIENT
Start: 2025-02-10

## 2025-02-09 RX ORDER — ACETAMINOPHEN 160 MG/5ML
650 SOLUTION ORAL EVERY 4 HOURS PRN
Status: ACTIVE | OUTPATIENT
Start: 2025-02-09

## 2025-02-09 RX ORDER — ACETAMINOPHEN 325 MG/1
650 TABLET ORAL EVERY 4 HOURS PRN
Status: ACTIVE | OUTPATIENT
Start: 2025-02-09

## 2025-02-09 RX ORDER — FERROUS SULFATE 325(65) MG
325 TABLET, DELAYED RELEASE (ENTERIC COATED) ORAL
Status: ON HOLD | COMMUNITY

## 2025-02-09 RX ADMIN — FERROUS SULFATE TAB 325 MG (65 MG ELEMENTAL FE) 325 MG: 325 (65 FE) TAB at 12:28

## 2025-02-09 RX ADMIN — TAMSULOSIN HYDROCHLORIDE 0.4 MG: 0.4 CAPSULE ORAL at 12:28

## 2025-02-09 RX ADMIN — ATORVASTATIN CALCIUM 10 MG: 10 TABLET, FILM COATED ORAL at 20:20

## 2025-02-09 RX ADMIN — HEPARIN SODIUM 5000 UNITS: 5000 INJECTION INTRAVENOUS; SUBCUTANEOUS at 16:09

## 2025-02-09 SDOH — SOCIAL STABILITY: SOCIAL INSECURITY: WITHIN THE LAST YEAR, HAVE YOU BEEN HUMILIATED OR EMOTIONALLY ABUSED IN OTHER WAYS BY YOUR PARTNER OR EX-PARTNER?: NO

## 2025-02-09 SDOH — SOCIAL STABILITY: SOCIAL INSECURITY: DOES ANYONE TRY TO KEEP YOU FROM HAVING/CONTACTING OTHER FRIENDS OR DOING THINGS OUTSIDE YOUR HOME?: NO

## 2025-02-09 SDOH — ECONOMIC STABILITY: FOOD INSECURITY
WITHIN THE PAST 12 MONTHS, YOU WORRIED THAT YOUR FOOD WOULD RUN OUT BEFORE YOU GOT THE MONEY TO BUY MORE.: PATIENT DECLINED

## 2025-02-09 SDOH — SOCIAL STABILITY: SOCIAL INSECURITY: HAS ANYONE EVER THREATENED TO HURT YOUR FAMILY OR YOUR PETS?: NO

## 2025-02-09 SDOH — SOCIAL STABILITY: SOCIAL INSECURITY: DO YOU FEEL UNSAFE GOING BACK TO THE PLACE WHERE YOU ARE LIVING?: NO

## 2025-02-09 SDOH — SOCIAL STABILITY: SOCIAL INSECURITY: WITHIN THE LAST YEAR, HAVE YOU BEEN AFRAID OF YOUR PARTNER OR EX-PARTNER?: NO

## 2025-02-09 SDOH — SOCIAL STABILITY: SOCIAL INSECURITY: HAVE YOU HAD THOUGHTS OF HARMING ANYONE ELSE?: NO

## 2025-02-09 SDOH — SOCIAL STABILITY: SOCIAL INSECURITY
WITHIN THE LAST YEAR, HAVE YOU BEEN KICKED, HIT, SLAPPED, OR OTHERWISE PHYSICALLY HURT BY YOUR PARTNER OR EX-PARTNER?: NO

## 2025-02-09 SDOH — ECONOMIC STABILITY: FOOD INSECURITY: WITHIN THE PAST 12 MONTHS, THE FOOD YOU BOUGHT JUST DIDN'T LAST AND YOU DIDN'T HAVE MONEY TO GET MORE.: PATIENT DECLINED

## 2025-02-09 SDOH — SOCIAL STABILITY: SOCIAL INSECURITY: ABUSE: ADULT

## 2025-02-09 SDOH — SOCIAL STABILITY: SOCIAL INSECURITY: DO YOU FEEL ANYONE HAS EXPLOITED OR TAKEN ADVANTAGE OF YOU FINANCIALLY OR OF YOUR PERSONAL PROPERTY?: NO

## 2025-02-09 SDOH — SOCIAL STABILITY: SOCIAL INSECURITY: HAVE YOU HAD ANY THOUGHTS OF HARMING ANYONE ELSE?: NO

## 2025-02-09 SDOH — SOCIAL STABILITY: SOCIAL INSECURITY: ARE YOU OR HAVE YOU BEEN THREATENED OR ABUSED PHYSICALLY, EMOTIONALLY, OR SEXUALLY BY ANYONE?: NO

## 2025-02-09 SDOH — ECONOMIC STABILITY: FOOD INSECURITY: HOW HARD IS IT FOR YOU TO PAY FOR THE VERY BASICS LIKE FOOD, HOUSING, MEDICAL CARE, AND HEATING?: PATIENT DECLINED

## 2025-02-09 SDOH — SOCIAL STABILITY: SOCIAL INSECURITY
WITHIN THE LAST YEAR, HAVE YOU BEEN RAPED OR FORCED TO HAVE ANY KIND OF SEXUAL ACTIVITY BY YOUR PARTNER OR EX-PARTNER?: NO

## 2025-02-09 SDOH — ECONOMIC STABILITY: HOUSING INSECURITY: AT ANY TIME IN THE PAST 12 MONTHS, WERE YOU HOMELESS OR LIVING IN A SHELTER (INCLUDING NOW)?: PATIENT DECLINED

## 2025-02-09 SDOH — ECONOMIC STABILITY: HOUSING INSECURITY: IN THE LAST 12 MONTHS, WAS THERE A TIME WHEN YOU WERE NOT ABLE TO PAY THE MORTGAGE OR RENT ON TIME?: PATIENT DECLINED

## 2025-02-09 SDOH — ECONOMIC STABILITY: TRANSPORTATION INSECURITY
IN THE PAST 12 MONTHS, HAS LACK OF TRANSPORTATION KEPT YOU FROM MEDICAL APPOINTMENTS OR FROM GETTING MEDICATIONS?: PATIENT DECLINED

## 2025-02-09 SDOH — ECONOMIC STABILITY: HOUSING INSECURITY: IN THE PAST 12 MONTHS, HOW MANY TIMES HAVE YOU MOVED WHERE YOU WERE LIVING?: 0

## 2025-02-09 SDOH — ECONOMIC STABILITY: INCOME INSECURITY
IN THE PAST 12 MONTHS HAS THE ELECTRIC, GAS, OIL, OR WATER COMPANY THREATENED TO SHUT OFF SERVICES IN YOUR HOME?: PATIENT DECLINED

## 2025-02-09 SDOH — SOCIAL STABILITY: SOCIAL INSECURITY: ARE THERE ANY APPARENT SIGNS OF INJURIES/BEHAVIORS THAT COULD BE RELATED TO ABUSE/NEGLECT?: NO

## 2025-02-09 SDOH — SOCIAL STABILITY: SOCIAL INSECURITY: WERE YOU ABLE TO COMPLETE ALL THE BEHAVIORAL HEALTH SCREENINGS?: YES

## 2025-02-09 ASSESSMENT — COGNITIVE AND FUNCTIONAL STATUS - GENERAL
CLIMB 3 TO 5 STEPS WITH RAILING: A LITTLE
DAILY ACTIVITIY SCORE: 24
PATIENT BASELINE BEDBOUND: NO
DAILY ACTIVITIY SCORE: 24
CLIMB 3 TO 5 STEPS WITH RAILING: A LITTLE
MOBILITY SCORE: 23
MOBILITY SCORE: 23

## 2025-02-09 ASSESSMENT — PAIN - FUNCTIONAL ASSESSMENT
PAIN_FUNCTIONAL_ASSESSMENT: 0-10
PAIN_FUNCTIONAL_ASSESSMENT: 0-10

## 2025-02-09 ASSESSMENT — ACTIVITIES OF DAILY LIVING (ADL)
HEARING - RIGHT EAR: FUNCTIONAL
ADEQUATE_TO_COMPLETE_ADL: YES
LACK_OF_TRANSPORTATION: PATIENT DECLINED
GROOMING: INDEPENDENT
DRESSING YOURSELF: INDEPENDENT
PATIENT'S MEMORY ADEQUATE TO SAFELY COMPLETE DAILY ACTIVITIES?: YES
TOILETING: INDEPENDENT
WALKS IN HOME: INDEPENDENT
FEEDING YOURSELF: INDEPENDENT
BATHING: INDEPENDENT
JUDGMENT_ADEQUATE_SAFELY_COMPLETE_DAILY_ACTIVITIES: YES
HEARING - LEFT EAR: FUNCTIONAL

## 2025-02-09 ASSESSMENT — LIFESTYLE VARIABLES
AUDIT-C TOTAL SCORE: 0
HOW MANY STANDARD DRINKS CONTAINING ALCOHOL DO YOU HAVE ON A TYPICAL DAY: PATIENT DOES NOT DRINK
HOW OFTEN DO YOU HAVE A DRINK CONTAINING ALCOHOL: NEVER
AUDIT-C TOTAL SCORE: 0
TOTAL SCORE: 0
HAVE PEOPLE ANNOYED YOU BY CRITICIZING YOUR DRINKING: NO
SKIP TO QUESTIONS 9-10: 1
EVER FELT BAD OR GUILTY ABOUT YOUR DRINKING: NO
HAVE YOU EVER FELT YOU SHOULD CUT DOWN ON YOUR DRINKING: NO
EVER HAD A DRINK FIRST THING IN THE MORNING TO STEADY YOUR NERVES TO GET RID OF A HANGOVER: NO
HOW OFTEN DO YOU HAVE 6 OR MORE DRINKS ON ONE OCCASION: NEVER

## 2025-02-09 ASSESSMENT — PATIENT HEALTH QUESTIONNAIRE - PHQ9
2. FEELING DOWN, DEPRESSED OR HOPELESS: NOT AT ALL
1. LITTLE INTEREST OR PLEASURE IN DOING THINGS: NOT AT ALL
SUM OF ALL RESPONSES TO PHQ9 QUESTIONS 1 & 2: 0

## 2025-02-09 ASSESSMENT — PAIN DESCRIPTION - LOCATION: LOCATION: KNEE

## 2025-02-09 ASSESSMENT — PAIN SCALES - GENERAL
PAINLEVEL_OUTOF10: 0 - NO PAIN

## 2025-02-09 ASSESSMENT — PAIN DESCRIPTION - ORIENTATION: ORIENTATION: RIGHT

## 2025-02-09 ASSESSMENT — PAIN SCALES - WONG BAKER: WONGBAKER_NUMERICALRESPONSE: NO HURT

## 2025-02-09 NOTE — PROGRESS NOTES
Pharmacy Medication History Review    Israel Franks is a 85 y.o. male admitted for Rupture of right quadriceps tendon, initial encounter. Pharmacy reviewed the patient's [id number] medications and allergies for accuracy.    The list below reflectives the updated PTA list. Please review each medication in order reconciliation for additional clarification and justification.  Prior to Admission medications    Medication Sig Start Date End Date Authorizing Provider   atorvastatin (Lipitor) 10 mg tablet Take 1 tablet (10 mg) by mouth once daily at bedtime.   Judah White MD   calcium citrate/vitamin D3 (CITRACAL REGULAR ORAL) Take 3 tablets by mouth once daily at bedtime.   Historical Provider, MD   Edarbi 40 mg tablet TAKE 1 TABLET BY MOUTH DAILY   Judah White MD   ferrous sulfate 325 (65 Fe) MG EC tablet Take 1 tablet by mouth once daily at noon. Take with meals.   Historical Provider, MD   levothyroxine (Synthroid, Levoxyl) 88 mcg tablet TAKE 1 TABLET BY MOUTH ONCE  DAILY   Judah White MD   metoprolol succinate XL (Toprol-XL) 50 mg 24 hr tablet Take 0.5 tablets (25 mg) by mouth once daily.   Judah White MD   multivitamin with minerals tablet Take 1 tablet by mouth once daily at noon. Take with meals.   Historical Provider, MD   tamsulosin (Flomax) 0.4 mg 24 hr capsule TAKE 1 CAPSULE BY MOUTH ONCE DAILY.   Judah White MD   UNABLE TO FIND Take 1 capsule by mouth 3 times a day. Med Name: Zenwise capsule   Historical Provider, MD   vitamins A,C,E-zinc-copper (ICaps AREDS) 4,296 mcg-226 mg-90 mg capsule Take 1 capsule by mouth 2 times a day.   Historical Provider, MD   denosumab (Prolia) 60 mg/mL syringe Inject 1 mL (60 mg total) under the skin every 6 months.   Gio Leroy MD        The list below reflectives the updated allergy list. Please review each documented allergy for additional clarification and justification.  Allergies  Reviewed by Khadijah Patel RN on  2/9/2025   No Known Allergies         Below are additional concerns with the patient's PTA list.      Peg Nichols

## 2025-02-09 NOTE — CONSULTS
Reason For Consult  Right quadriceps tendon tear.    History Of Present Illness  Israel Franks is a 85 y.o. male presenting with right quadriceps tendon tear.  The patient slipped and fell on 1/16/2025 resulting in a distal quadriceps tendon rupture.  He has been having difficulty ambulating due to the pain and extensor mechanism lag.  No previous history of surgery or injections to the right knee.  Based on his social situation of living at home alone and concern for steadiness/falls we have recommended operative reconstruction.  He presents to the emergency department for admission and surgical intervention and possible rehab placement postoperatively.     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    Surgical History  He has a past surgical history that includes Cholecystectomy (04/15/2014); Other surgical history (04/15/2014); Colonoscopy (06/09/2015); and US guided abscess drain (7/6/2023).     Social History  He reports that he quit smoking about 62 years ago. His smoking use included cigarettes. He started smoking about 56 years ago. He has never used smokeless tobacco. He reports that he does not currently use alcohol after a past usage of about 3.0 standard drinks of alcohol per week. He reports that he does not use drugs.    Family History  Family History   Problem Relation Name Age of Onset    Hypertension Father      Colon cancer Sister          Allergies  Patient has no known allergies.    Review of Systems  As per HPI.     Physical  "Exam  Laterality: Right knee Exam  This is a well-appearing patient in no acute distress.  There is mild effusion to the knee.  He is unable to straight leg raise and has a 40 degree extensor mechanism lag.  There is a palpable defect to the distal quadriceps insertion into the patella.  5/5 Strength TA, EHL, GS     NEUROVASCULAR:  - Neurovascular Status: sensation intact to light touch distally, lower extremity motor intact  - Capillary refill brisk at extremities, Bilateral dorsalis pedis pulse 2+     Imaging: Multiple views of the affected right knee(s) demonstrate: No evidence of acute fracture or dislocation.  There is a low-lying patella concerning for quadriceps tendon rupture.   X-rays were personally reviewed and interpreted by me.  Radiology reports were reviewed by me as well, if readily available at the time.     MRI of the right knee dated 1/31/2025 reveals high-grade near full-thickness tear of the distal quadriceps tendon.     Last Recorded Vitals  Blood pressure 122/59, pulse 73, temperature 36.9 °C (98.4 °F), temperature source Temporal, resp. rate 16, height 1.803 m (5' 11\"), weight 54.4 kg (120 lb), SpO2 98%.    Relevant Results      Scheduled medications  atorvastatin, 10 mg, oral, Nightly  azilsartan medoxomiL, 1 tablet, oral, Daily  ferrous sulfate (325 mg ferrous sulfate), 325 mg, oral, Daily with lunch  heparin (porcine), 5,000 Units, subcutaneous, q8h  [START ON 2/10/2025] levothyroxine, 88 mcg, oral, Daily  [START ON 2/10/2025] metoprolol succinate XL, 25 mg, oral, Daily  polyethylene glycol, 17 g, oral, Daily  tamsulosin, 0.4 mg, oral, Daily      Continuous medications     PRN medications  PRN medications: acetaminophen **OR** acetaminophen **OR** acetaminophen, morphine, ondansetron **OR** ondansetron  Results for orders placed or performed during the hospital encounter of 02/09/25 (from the past 24 hours)   CBC and Auto Differential   Result Value Ref Range    WBC 8.6 4.4 - 11.3 x10*3/uL "    nRBC 0.0 0.0 - 0.0 /100 WBCs    RBC 3.58 (L) 4.50 - 5.90 x10*6/uL    Hemoglobin 11.5 (L) 13.5 - 17.5 g/dL    Hematocrit 35.7 (L) 41.0 - 52.0 %     80 - 100 fL    MCH 32.1 26.0 - 34.0 pg    MCHC 32.2 32.0 - 36.0 g/dL    RDW 12.8 11.5 - 14.5 %    Platelets 257 150 - 450 x10*3/uL    Neutrophils % 76.6 40.0 - 80.0 %    Immature Granulocytes %, Automated 0.3 0.0 - 0.9 %    Lymphocytes % 13.1 13.0 - 44.0 %    Monocytes % 9.2 2.0 - 10.0 %    Eosinophils % 0.0 0.0 - 6.0 %    Basophils % 0.8 0.0 - 2.0 %    Neutrophils Absolute 6.58 (H) 1.60 - 5.50 x10*3/uL    Immature Granulocytes Absolute, Automated 0.03 0.00 - 0.50 x10*3/uL    Lymphocytes Absolute 1.13 0.80 - 3.00 x10*3/uL    Monocytes Absolute 0.79 0.05 - 0.80 x10*3/uL    Eosinophils Absolute 0.00 0.00 - 0.40 x10*3/uL    Basophils Absolute 0.07 0.00 - 0.10 x10*3/uL   Basic metabolic panel   Result Value Ref Range    Glucose 95 74 - 99 mg/dL    Sodium 139 136 - 145 mmol/L    Potassium 4.5 3.5 - 5.3 mmol/L    Chloride 105 98 - 107 mmol/L    Bicarbonate 26 21 - 32 mmol/L    Anion Gap 13 10 - 20 mmol/L    Urea Nitrogen 38 (H) 6 - 23 mg/dL    Creatinine 1.26 0.50 - 1.30 mg/dL    eGFR 56 (L) >60 mL/min/1.73m*2    Calcium 10.0 8.6 - 10.3 mg/dL   Hepatic function panel   Result Value Ref Range    Albumin 4.1 3.4 - 5.0 g/dL    Bilirubin, Total 1.1 0.0 - 1.2 mg/dL    Bilirubin, Direct 0.2 0.0 - 0.3 mg/dL    Alkaline Phosphatase 81 33 - 136 U/L    ALT 25 10 - 52 U/L    AST 29 9 - 39 U/L    Total Protein 7.5 6.4 - 8.2 g/dL   Protime-INR   Result Value Ref Range    Protime 11.5 9.8 - 12.8 seconds    INR 1.0 0.9 - 1.1   TSH   Result Value Ref Range    Thyroid Stimulating Hormone 4.71 (H) 0.44 - 3.98 mIU/L        Assessment/Plan     This is an 85-year-old male with right quadriceps tendon rupture and extensor mechanism lag.At this time, we have discussed nonoperative versus operative management options. Based on his subjective instability, full-thickness rupture with  quadriceps tear and extensor mechanism lag, and concern for dysfunction and/or instability during ambulation I recommended operative repair. Risk of the procedure include but are not limited to infection, nerve damage, blood loss, persistent pain, failure of repair.    - Admission for operative repair of the right quadriceps tendon.  - Hold DVT prophylaxis prior to the OR.  - Pain medication as needed.  - Preoperative medical optimization as per medical team.  - N.p.o. at midnight for planned operative intervention on 2/10/2025.    Torsten Teixeira MD MPH

## 2025-02-09 NOTE — H&P
"History Of Present Illness/patient seen and examined by me.  History obtained from the patient and the NP's notes and the ED notes reviewed.  Israel Franks is a very pleasant 85 y.o. male with a past medical history of hypertension, heart murmur/seen 8/2024 and had 2D echocardiogram done 7/2024 which showed moderate to severe aortic valve stenosis and severe aortic valve calcification and mild to moderate aortic valve regurgitation and moderate mitral annular calcification and left ventricular ejection fraction is normal about 55 to 60%/followed by cardiologist at /Dr. 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  Of note, patient recently seen in his orthopedic surgery office on 1/28 for right knee injury.  Patient slipped while walking into his house on 1/16 and twisted his knee.  Patient was seen at an urgent care.  His x-rays were negative for fracture and he was told to follow-up with Ortho.  Patient was diagnosed with a likely right distal quadricep tendon rupture.  He was then placed into a T scope locked in extension. MRI ordered 1/31- results below.  Patient states that the brace he was given from his orthopedic office was \"hard to get on\" and so he has been using just a sleeve on it.  Patient denies current pain.  Patient states he is still able to ambulate on his own.  He denies fever/chills, headache, dizziness, URI symptoms, chest pain, shortness of breath, palpitations, abdominal pain, nausea/vomiting, diarrhea, constipation, urinary symptoms, numbness/tingling, weakness.  He denies tobacco use, states that he drinks wine occasionally on the weekends.    MRI knee right w/o IV contrast 1/31/2025:  High-grade, near " full-thickness tear of the distal quadriceps tendon  with a few fibers of the vastus lateralis remaining intact.  Retraction of the torn fibers with a fluid-filled gap of 3 cm.  Moderate muscle edema in the distal quadriceps musculature. Moderate  size suprapatellar joint effusion.     ED course: On arrival, patient's /61, heart rate 79, respirations 18, afebrile, saturating 98% on room air.  Labs and imaging performed, revealing BUN 38, creatinine 1.26.  LFTs WNL.  No white count.  Hemoglobin slightly decreased at 11.5 (12.7 on 3/8/2024).  Platelets WNL.  Chest x-ray shows emphysematous changes with chronic appearing interstitial changes.  Orthopedic surgery consulted in the emergency department.  Patient has been admitted from the ED for further treatment and evaluation of his ongoing symptoms as above.  Patient lives at home by himself.  Patient states that he quit smoking about 61 years ago.  He drinks 3 drinks per week.  He denies any drug use.  2D echocardiogram done 7/2024 showed left ventricular ejection fraction was normal/about 55 to 60% normal right ventricular global systolic function/moderate mitral annular calcification/moderate to severe aortic valve stenosis and mild to moderate aortic valve regurgitation/severe aortic valve cusp calcification.  Patient states that he exercises 3 times a week at the gym about an hour and a half and has no symptoms of dizziness or chest pain.  As per cardiology he has been advised to do an echocardiogram in 6 months and as per his note TAV R is anticipated in the future.  Patient is laying in bed and denies any acute complaints except his knee pain.  Patient denies any chest pain or shortness of breath or cough.  Patient denies any he has no dizziness.  He denies any headaches.  Patient has no abdominal pain constipation diarrhea or dysuria.  He takes Flomax for his BPH.  Past Medical History  Past Medical History:   Diagnosis Date    Epigastric pain 12/28/2016     Abdominal pain, epigastric    Finger injury 06/12/2024    Gastrojejunal ulcer, unspecified as acute or chronic, without hemorrhage or perforation     Jejunal ulcer    Gilbert syndrome     Gilbert syndrome    Hyperlipidemia     Hypertension     Iron deficiency anemia secondary to blood loss (chronic)     Iron deficiency anemia due to chronic blood loss    Iron deficiency anemia secondary to blood loss (chronic) 12/27/2016    Anemia due to blood loss    Other long term (current) drug therapy 06/17/2016    High risk medication use    Personal history of (healed) traumatic fracture     History of fracture of left hip    Tinnitus, right ear 06/17/2016    Tinnitus of right ear       Surgical History  Past Surgical History:   Procedure Laterality Date    CHOLECYSTECTOMY  04/15/2014    Cholecystectomy    COLONOSCOPY  06/09/2015    Complete Colonoscopy    OTHER SURGICAL HISTORY  04/15/2014    Partial Gastrectomy Billroth II    US GUIDED ABSCESS DRAIN  7/6/2023    US GUIDED ABSCESS DRAIN 7/6/2023 [facility]        Social History  He reports that he quit smoking about 62 years ago. His smoking use included cigarettes. He started smoking about 56 years ago. He has never used smokeless tobacco. He reports that he does not currently use alcohol after a past usage of about 3.0 standard drinks of alcohol per week. He reports that he does not use drugs.    Family History  Family History   Problem Relation Name Age of Onset    Hypertension Father      Colon cancer Sister          Allergies  Patient has no known allergies.    Review of Systems  Negative except as stated above in HPI.     Physical Exam  Constitutional:       General: He is not in acute distress.     Appearance: Normal appearance. He is not ill-appearing.      Comments: Patient pleasant and conversant.  Alert and oriented x 3.  No apparent distress.   HENT:      Head: Normocephalic and atraumatic.      Nose: Nose normal.      Mouth/Throat:      Mouth: Mucous membranes  "are moist.      Pharynx: Oropharynx is clear.   Eyes:      Extraocular Movements: Extraocular movements intact.      Conjunctiva/sclera: Conjunctivae normal.      Pupils: Pupils are equal, round, and reactive to light.   Cardiovascular:      Rate and Rhythm: Normal rate and regular rhythm.      Heart sounds: Murmur (Aortic) heard.   Pulmonary:      Effort: Pulmonary effort is normal.      Breath sounds: Normal breath sounds. No wheezing or rales.   Abdominal:      General: Abdomen is flat. Bowel sounds are normal.      Palpations: Abdomen is soft.      Tenderness: There is no abdominal tenderness.   Musculoskeletal:         General: Swelling (Swelling present over right patella.  Palpable gap felt above right patella.  Neurovascular status intact.  No erythema/ecchymosis appreciated.  Mildly tender to palpation.) present. Normal range of motion.      Cervical back: Normal range of motion.   Skin:     General: Skin is warm and dry.   Neurological:      General: No focal deficit present.      Mental Status: He is alert and oriented to person, place, and time.   Psychiatric:         Mood and Affect: Mood normal.         Behavior: Behavior normal.         Thought Content: Thought content normal.          Last Recorded Vitals  Blood pressure 90/52, pulse 69, temperature 36.9 °C (98.4 °F), temperature source Temporal, resp. rate 16, height 1.803 m (5' 10.98\"), weight 54.4 kg (120 lb), SpO2 95%.    Relevant Results    Scheduled medications  atorvastatin, 10 mg, oral, Nightly  ferrous sulfate (325 mg ferrous sulfate), 325 mg, oral, Daily with lunch  heparin (porcine), 5,000 Units, subcutaneous, q8h  [START ON 2/10/2025] levothyroxine, 88 mcg, oral, Daily  losartan, 50 mg, oral, Daily  [START ON 2/10/2025] metoprolol succinate XL, 25 mg, oral, Daily  polyethylene glycol, 17 g, oral, Daily  tamsulosin, 0.4 mg, oral, Daily      Continuous medications     PRN medications  PRN medications: acetaminophen **OR** acetaminophen " **OR** acetaminophen, morphine, ondansetron **OR** ondansetron    Results for orders placed or performed during the hospital encounter of 02/09/25 (from the past 24 hours)   CBC and Auto Differential   Result Value Ref Range    WBC 8.6 4.4 - 11.3 x10*3/uL    nRBC 0.0 0.0 - 0.0 /100 WBCs    RBC 3.58 (L) 4.50 - 5.90 x10*6/uL    Hemoglobin 11.5 (L) 13.5 - 17.5 g/dL    Hematocrit 35.7 (L) 41.0 - 52.0 %     80 - 100 fL    MCH 32.1 26.0 - 34.0 pg    MCHC 32.2 32.0 - 36.0 g/dL    RDW 12.8 11.5 - 14.5 %    Platelets 257 150 - 450 x10*3/uL    Neutrophils % 76.6 40.0 - 80.0 %    Immature Granulocytes %, Automated 0.3 0.0 - 0.9 %    Lymphocytes % 13.1 13.0 - 44.0 %    Monocytes % 9.2 2.0 - 10.0 %    Eosinophils % 0.0 0.0 - 6.0 %    Basophils % 0.8 0.0 - 2.0 %    Neutrophils Absolute 6.58 (H) 1.60 - 5.50 x10*3/uL    Immature Granulocytes Absolute, Automated 0.03 0.00 - 0.50 x10*3/uL    Lymphocytes Absolute 1.13 0.80 - 3.00 x10*3/uL    Monocytes Absolute 0.79 0.05 - 0.80 x10*3/uL    Eosinophils Absolute 0.00 0.00 - 0.40 x10*3/uL    Basophils Absolute 0.07 0.00 - 0.10 x10*3/uL   Basic metabolic panel   Result Value Ref Range    Glucose 95 74 - 99 mg/dL    Sodium 139 136 - 145 mmol/L    Potassium 4.5 3.5 - 5.3 mmol/L    Chloride 105 98 - 107 mmol/L    Bicarbonate 26 21 - 32 mmol/L    Anion Gap 13 10 - 20 mmol/L    Urea Nitrogen 38 (H) 6 - 23 mg/dL    Creatinine 1.26 0.50 - 1.30 mg/dL    eGFR 56 (L) >60 mL/min/1.73m*2    Calcium 10.0 8.6 - 10.3 mg/dL   Hepatic function panel   Result Value Ref Range    Albumin 4.1 3.4 - 5.0 g/dL    Bilirubin, Total 1.1 0.0 - 1.2 mg/dL    Bilirubin, Direct 0.2 0.0 - 0.3 mg/dL    Alkaline Phosphatase 81 33 - 136 U/L    ALT 25 10 - 52 U/L    AST 29 9 - 39 U/L    Total Protein 7.5 6.4 - 8.2 g/dL   Protime-INR   Result Value Ref Range    Protime 11.5 9.8 - 12.8 seconds    INR 1.0 0.9 - 1.1   TSH   Result Value Ref Range    Thyroid Stimulating Hormone 4.71 (H) 0.44 - 3.98 mIU/L     XR chest 1  view    Result Date: 2/9/2025  Interpreted By:  Laura Lester, STUDY: XR CHEST 1 VIEW 2/9/2025 9:39 am   INDICATION: Signs/Symptoms:pre op   COMPARISON: None available.   ACCESSION NUMBER(S): [id number]   ORDERING CLINICIAN: CURT PEÑA   TECHNIQUE: Single portable AP view chest   FINDINGS: Examination rotated accentuating the cardiac silhouette. There is mild cardiomegaly. Mild vascular calcification of the aortic knob. Surgical staples demonstrated at the level of the gastroesophageal junction. Lung fields are hyperinflated with emphysematous changes demonstrated.                 1. Emphysematous changes with chronic appearing interstitial changes.   Signed by: Laura Lester 2/9/2025 10:07 AM Dictation workstation:   [id number]        Assessment/Plan   Assessment & Plan  Rupture of right quadriceps tendon, initial encounter    #1/Hx of mechanical fall 1/16/2024 resulting in rupture of right quadriceps tendon/moderate right suprapatellar effusion/orthopedic consultation has been obtained.  Further treatment and management as per orthopedic.  EKG done today shows normal sinus rhythm with first-degree AV block with PVCs and left anterior fascicular block/no acute ST-T changes.  Cardiology consultation has been obtained for cardiac clearance prior to his surgery tomorrow.    Hx greater trochanteric bursitis  -Orthopedic surgery consulted in the emergency department, appreciate recommendations  -Of note, patient recently seen in his orthopedic surgery office on 1/28 for right knee injury.  Patient slipped while walking into his house on 1/16 and twisted his knee.  Patient was seen at an urgent care.  His x-rays were negative for fracture and he was told to follow-up with Ortho.  Patient was diagnosed with a likely right distal quadricep tendon rupture.  He was then placed into a T scope locked in extension.   - MRI right knee results above in HPI  -N.p.o. at midnight for orthopedic procedure in the  a.m.  -Will add on urinalysis  -Multimodal pain control  -PT/OT eval and treat, SW TCC for discharge planning    #2/history of iron deficiency anemia megaloblastic anemia hemoglobin is borderline 11.5.  #3/chronic renal insufficiency/history of BPH  -BUN slightly elevated at 38, creatinine 1.26  -Monitor with CBC    #4/hypertension/blood pressure is soft at 90/52 and will hold blood pressure medicines for systolic less than 140.  #5/hyperlipidemia/continue his statin treatment postop.  #6/Moderate to severe aortic valve stenosis/severe aortic valve calcification and mild to moderate aortic valve regurgitation and moderate mild pleural annular calcification and EF is 55 to 60% on echo of 7/2024 and followed by Dr. Ascencion Miranda/cardiology /patient remains asymptomatic.  Cardiology consultation will be obtained for clearance for upcoming surgery on the knee tomorrow.  Pending cardiology clearance given his cardiac risks patient is medically cleared for surgery after cleared by cardiology.  -Cardiology consult for pre op clearance  -Continue medications as ordered  -N.p.o. at midnight    Hypothyroidism  -TSH from 3/8/2024 7.18, will add on TSH  -Continue medications as ordered, n.p.o. at midnight    DVT prophylaxis  -Heparin, hold a.m. dose  -SCDs  History of former smoking/chest x-ray noted and shows chronic changes mild cardiomegaly and mild vascular calcification of the aortic knob and emphysematous changes and patient remains on room air and is pulse oxing 95%.  Medications reconciled.  Labs and vitals and imaging reports reviewed and noted.  Ortho and cardiology consults have been obtained.  Total time spent on patient interaction counseling assessment and plan and documentation 56 minutes.      Brien Iyer MD

## 2025-02-09 NOTE — H&P
"History Of Present Illness  Israel Franks is a 85 y.o. male with a past medical history of hypertension, hyperlipidemia, hypothyroidism, megaloblastic anemia, chronic renal insufficiency, Gilbert's syndrome, BPH, and greater trochanteric bursitis who presents to the emergency department for evaluation of knee pain.  Of note, patient recently seen in his orthopedic surgery office on 1/28 for right knee injury.  Patient slipped while walking into his house on 1/16 and twisted his knee.  Patient was seen at an urgent care.  His x-rays were negative for fracture and he was told to follow-up with Ortho.  Patient was diagnosed with a likely right distal quadricep tendon rupture.  He was then placed into a T scope locked in extension. MRI ordered 1/31- results below.  Patient states that the brace he was given from his orthopedic office was \"hard to get on\" and so he has been using just a sleeve on it.  Patient denies current pain.  Patient states he is still able to ambulate on his own.  He denies fever/chills, headache, dizziness, URI symptoms, chest pain, shortness of breath, palpitations, abdominal pain, nausea/vomiting, diarrhea, constipation, urinary symptoms, numbness/tingling, weakness.  He denies tobacco use, states that he drinks wine occasionally on the weekends.    MRI knee right w/o IV contrast 1/31/2025:  High-grade, near full-thickness tear of the distal quadriceps tendon  with a few fibers of the vastus lateralis remaining intact.  Retraction of the torn fibers with a fluid-filled gap of 3 cm.  Moderate muscle edema in the distal quadriceps musculature. Moderate  size suprapatellar joint effusion.     ED course: On arrival, patient's /61, heart rate 79, respirations 18, afebrile, saturating 98% on room air.  Labs and imaging performed, revealing BUN 38, creatinine 1.26.  LFTs WNL.  No white count.  Hemoglobin slightly decreased at 11.5 (12.7 on 3/8/2024).  Platelets WNL.  Chest x-ray shows " emphysematous changes with chronic appearing interstitial changes.  Orthopedic surgery consulted in the emergency department.  Patient to be admitted inpatient under Dr. Iyer.     Past Medical History  Past Medical History:   Diagnosis Date    Epigastric pain 12/28/2016    Abdominal pain, epigastric    Finger injury 06/12/2024    Gastrojejunal ulcer, unspecified as acute or chronic, without hemorrhage or perforation     Jejunal ulcer    Gilbert syndrome     Gilbert syndrome    Hyperlipidemia     Hypertension     Iron deficiency anemia secondary to blood loss (chronic)     Iron deficiency anemia due to chronic blood loss    Iron deficiency anemia secondary to blood loss (chronic) 12/27/2016    Anemia due to blood loss    Other long term (current) drug therapy 06/17/2016    High risk medication use    Personal history of (healed) traumatic fracture     History of fracture of left hip    Tinnitus, right ear 06/17/2016    Tinnitus of right ear       Surgical History  Past Surgical History:   Procedure Laterality Date    CHOLECYSTECTOMY  04/15/2014    Cholecystectomy    COLONOSCOPY  06/09/2015    Complete Colonoscopy    OTHER SURGICAL HISTORY  04/15/2014    Partial Gastrectomy Billroth II    US GUIDED ABSCESS DRAIN  7/6/2023    US GUIDED ABSCESS DRAIN 7/6/2023 [facility]        Social History  He reports that he quit smoking about 62 years ago. His smoking use included cigarettes. He started smoking about 56 years ago. He has never used smokeless tobacco. He reports that he does not currently use alcohol after a past usage of about 3.0 standard drinks of alcohol per week. He reports that he does not use drugs.    Family History  Family History   Problem Relation Name Age of Onset    Hypertension Father      Colon cancer Sister          Allergies  Patient has no known allergies.    Review of Systems  Negative except as stated above in HPI.     Physical Exam  Constitutional:       General: He is not in acute distress.      "Appearance: Normal appearance. He is not ill-appearing.      Comments: Patient pleasant and conversant.  Alert and oriented x 3.  No apparent distress.   HENT:      Head: Normocephalic and atraumatic.      Nose: Nose normal.      Mouth/Throat:      Mouth: Mucous membranes are moist.      Pharynx: Oropharynx is clear.   Eyes:      Extraocular Movements: Extraocular movements intact.      Conjunctiva/sclera: Conjunctivae normal.      Pupils: Pupils are equal, round, and reactive to light.   Cardiovascular:      Rate and Rhythm: Normal rate and regular rhythm.      Heart sounds: Murmur (Aortic) heard.   Pulmonary:      Effort: Pulmonary effort is normal.      Breath sounds: Normal breath sounds. No wheezing or rales.   Abdominal:      General: Abdomen is flat. Bowel sounds are normal.      Palpations: Abdomen is soft.      Tenderness: There is no abdominal tenderness.   Musculoskeletal:         General: Swelling (Swelling present over right patella.  Palpable gap felt above right patella.  Neurovascular status intact.  No erythema/ecchymosis appreciated.  Mildly tender to palpation.) present. Normal range of motion.      Cervical back: Normal range of motion.   Skin:     General: Skin is warm and dry.   Neurological:      General: No focal deficit present.      Mental Status: He is alert and oriented to person, place, and time.   Psychiatric:         Mood and Affect: Mood normal.         Behavior: Behavior normal.         Thought Content: Thought content normal.          Last Recorded Vitals  Blood pressure 122/59, pulse 73, temperature 36.9 °C (98.4 °F), temperature source Temporal, resp. rate 16, height 1.803 m (5' 11\"), weight 54.4 kg (120 lb), SpO2 98%.    Relevant Results    Scheduled medications  atorvastatin, 10 mg, oral, Nightly  azilsartan medoxomiL, 1 tablet, oral, Daily  ferrous sulfate, 325 mg, oral, Daily with lunch  heparin (porcine), 5,000 Units, subcutaneous, q8h  [START ON 2/10/2025] levothyroxine, 88 " mcg, oral, Daily  [START ON 2/10/2025] metoprolol succinate XL, 25 mg, oral, Daily  polyethylene glycol, 17 g, oral, Daily  tamsulosin, 0.4 mg, oral, Daily      Continuous medications     PRN medications  PRN medications: acetaminophen **OR** acetaminophen **OR** acetaminophen, morphine, ondansetron **OR** ondansetron    Results for orders placed or performed during the hospital encounter of 02/09/25 (from the past 24 hours)   CBC and Auto Differential   Result Value Ref Range    WBC 8.6 4.4 - 11.3 x10*3/uL    nRBC 0.0 0.0 - 0.0 /100 WBCs    RBC 3.58 (L) 4.50 - 5.90 x10*6/uL    Hemoglobin 11.5 (L) 13.5 - 17.5 g/dL    Hematocrit 35.7 (L) 41.0 - 52.0 %     80 - 100 fL    MCH 32.1 26.0 - 34.0 pg    MCHC 32.2 32.0 - 36.0 g/dL    RDW 12.8 11.5 - 14.5 %    Platelets 257 150 - 450 x10*3/uL    Neutrophils % 76.6 40.0 - 80.0 %    Immature Granulocytes %, Automated 0.3 0.0 - 0.9 %    Lymphocytes % 13.1 13.0 - 44.0 %    Monocytes % 9.2 2.0 - 10.0 %    Eosinophils % 0.0 0.0 - 6.0 %    Basophils % 0.8 0.0 - 2.0 %    Neutrophils Absolute 6.58 (H) 1.60 - 5.50 x10*3/uL    Immature Granulocytes Absolute, Automated 0.03 0.00 - 0.50 x10*3/uL    Lymphocytes Absolute 1.13 0.80 - 3.00 x10*3/uL    Monocytes Absolute 0.79 0.05 - 0.80 x10*3/uL    Eosinophils Absolute 0.00 0.00 - 0.40 x10*3/uL    Basophils Absolute 0.07 0.00 - 0.10 x10*3/uL   Basic metabolic panel   Result Value Ref Range    Glucose 95 74 - 99 mg/dL    Sodium 139 136 - 145 mmol/L    Potassium 4.5 3.5 - 5.3 mmol/L    Chloride 105 98 - 107 mmol/L    Bicarbonate 26 21 - 32 mmol/L    Anion Gap 13 10 - 20 mmol/L    Urea Nitrogen 38 (H) 6 - 23 mg/dL    Creatinine 1.26 0.50 - 1.30 mg/dL    eGFR 56 (L) >60 mL/min/1.73m*2    Calcium 10.0 8.6 - 10.3 mg/dL   Hepatic function panel   Result Value Ref Range    Albumin 4.1 3.4 - 5.0 g/dL    Bilirubin, Total 1.1 0.0 - 1.2 mg/dL    Bilirubin, Direct 0.2 0.0 - 0.3 mg/dL    Alkaline Phosphatase 81 33 - 136 U/L    ALT 25 10 - 52 U/L     AST 29 9 - 39 U/L    Total Protein 7.5 6.4 - 8.2 g/dL   Protime-INR   Result Value Ref Range    Protime 11.5 9.8 - 12.8 seconds    INR 1.0 0.9 - 1.1     XR chest 1 view    Result Date: 2/9/2025  Interpreted By:  Laura Lester, STUDY: XR CHEST 1 VIEW 2/9/2025 9:39 am   INDICATION: Signs/Symptoms:pre op   COMPARISON: None available.   ACCESSION NUMBER(S): [id number]   ORDERING CLINICIAN: CURT PEÑA   TECHNIQUE: Single portable AP view chest   FINDINGS: Examination rotated accentuating the cardiac silhouette. There is mild cardiomegaly. Mild vascular calcification of the aortic knob. Surgical staples demonstrated at the level of the gastroesophageal junction. Lung fields are hyperinflated with emphysematous changes demonstrated.                 1. Emphysematous changes with chronic appearing interstitial changes.   Signed by: Laura Lester 2/9/2025 10:07 AM Dictation workstation:   [id number]        Assessment/Plan   Assessment & Plan  Rupture of right quadriceps tendon, initial encounter    Rupture of right quadriceps tendon  Hx of fall  Hx greater trochanteric bursitis  -Orthopedic surgery consulted in the emergency department, appreciate recommendations  -Of note, patient recently seen in his orthopedic surgery office on 1/28 for right knee injury.  Patient slipped while walking into his house on 1/16 and twisted his knee.  Patient was seen at an urgent care.  His x-rays were negative for fracture and he was told to follow-up with Ortho.  Patient was diagnosed with a likely right distal quadricep tendon rupture.  He was then placed into a T scope locked in extension.   - MRI right knee results above in HPI  -N.p.o. at midnight for orthopedic procedure in the a.m.  -Will add on urinalysis  -Multimodal pain control  -PT/OT eval and treat,  TCC for discharge planning    Megaloblastic anemia  Chronic renal insufficiency  -Hemoglobin stable at 11.5  -BUN slightly elevated at 38, creatinine 1.26  -Monitor  with CBC    Hypertension  Hyperlipidemia  Murmur, aortic stenosis  -Cardiology consult for pre op clearance  -Continue medications as ordered  -N.p.o. at midnight    Hypothyroidism  -TSH from 3/8/2024 7.18, will add on TSH  -Continue medications as ordered, n.p.o. at midnight    DVT prophylaxis  -Heparin, hold a.m. dose  -SCDs    I spent 50 minutes in the professional and overall care of this patient.      Jhoana Nuñez PA-C

## 2025-02-09 NOTE — PROGRESS NOTES
PRIMARY CARE PHYSICIAN: Judah White MD  REFERRING PROVIDER: No referring provider defined for this encounter.     CONSULT ORTHOPAEDIC: Knee Evaluation    ASSESSMENT & PLAN    Impression/Plan: This is an 85-year-old male who sustained a right quadriceps tendon rupture on 1/16/2024.  At this time, we have discussed nonoperative versus operative management options.  Based on his subjective instability, full-thickness rupture with quadriceps tear and extensor mechanism lag, and concern for dysfunction and/or instability during ambulation I recommended operative repair.  Risk of the procedure include but are not limited to infection, nerve damage, blood loss, persistent pain, failure of repair.    In addition, based on this injury being roughly 4 weeks out and is concern for safety/ambulation in the home I recommended the patient present to the emergency department for admission to the hospital where he will obtain preoperative medical optimization, surgical repair, and potential discharge to a skilled nursing facility postoperatively.  All questions were answered.      SUBJECTIVE  CHIEF COMPLAINT:   Chief Complaint   Patient presents with    Right Knee - Pain     NPV - ref by Dr. Barkley.  Slid in his house on 2 steps in his kitchen on 1/16/25.        HPI: Israel Franks is a 85 y.o. patient.  The patient presents for evaluation of right leg pain, dysfunction, and subjective instability.  The patient slipped at his home on 1/16/2024 resulting in immediate pain and discomfort.  He has no previous history of knee surgery or tendon surgery to the right side.  He lives alone and ambulates without assistive device.  He has had difficulty ambulating and feeling unsteady due to the injury.  He recently underwent MRI which confirmed full-thickness quadriceps tendon rupture and he presents today first-time evaluation in our office.  He denies any distal numbness or tingling.      REVIEW OF SYSTEMS  Constitutional: See HPI  for pain assessment, No significant weight loss, recent trauma  Cardiovascular: No chest pain, shortness of breath  Respiratory: No difficulty breathing, cough  Gastrointestinal: No nausea, vomiting, diarrhea, constipation  Musculoskeletal: Noted in HPI, positive for pain, restricted motion, stiffness  Integumentary: No rashes, easy bruising, redness   Neurological: no numbness or tingling in extremities, no gait disturbances   Psychiatric: No mood changes, memory changes, social issues  Heme/Lymph: no excessive swelling, easy bruising, excessive bleeding  ENT: No hearing changes  Eyes: No vision changes    Past Medical History:   Diagnosis Date    Epigastric pain 12/28/2016    Abdominal pain, epigastric    Finger injury 06/12/2024    Gastrojejunal ulcer, unspecified as acute or chronic, without hemorrhage or perforation     Jejunal ulcer    Gilbert syndrome     Gilbert syndrome    Hyperlipidemia     Hypertension     Iron deficiency anemia secondary to blood loss (chronic)     Iron deficiency anemia due to chronic blood loss    Iron deficiency anemia secondary to blood loss (chronic) 12/27/2016    Anemia due to blood loss    Other long term (current) drug therapy 06/17/2016    High risk medication use    Personal history of (healed) traumatic fracture     History of fracture of left hip    Tinnitus, right ear 06/17/2016    Tinnitus of right ear        No Known Allergies     Past Surgical History:   Procedure Laterality Date    CHOLECYSTECTOMY  04/15/2014    Cholecystectomy    COLONOSCOPY  06/09/2015    Complete Colonoscopy    OTHER SURGICAL HISTORY  04/15/2014    Partial Gastrectomy Billroth II    US GUIDED ABSCESS DRAIN  7/6/2023    US GUIDED ABSCESS DRAIN 7/6/2023 [facility]        Family History   Problem Relation Name Age of Onset    Hypertension Father      Colon cancer Sister          Social History     Socioeconomic History    Marital status: Single     Spouse name: Not on file    Number of children: Not on file     Years of education: Not on file    Highest education level: Not on file   Occupational History    Not on file   Tobacco Use    Smoking status: Former     Types: Cigarettes     Start date:      Quit date:      Years since quittin.1    Smokeless tobacco: Never   Substance and Sexual Activity    Alcohol use: Not Currently     Alcohol/week: 3.0 standard drinks of alcohol     Types: 3 Glasses of wine per week    Drug use: Never    Sexual activity: Not on file   Other Topics Concern    Not on file   Social History Narrative    Not on file     Social Drivers of Health     Financial Resource Strain: Not on file   Food Insecurity: Not on file   Transportation Needs: Not on file   Physical Activity: Not on file   Stress: Not on file   Social Connections: Not on file   Intimate Partner Violence: Not on file   Housing Stability: Not on file        CURRENT MEDICATIONS:   No current facility-administered medications for this visit.     No current outpatient medications on file.     Facility-Administered Medications Ordered in Other Visits   Medication Dose Route Frequency Provider Last Rate Last Admin    acetaminophen (Tylenol) tablet 650 mg  650 mg oral q4h PRN Jhoana F TIFFANY Nuñez-C        Or    acetaminophen (Tylenol) oral liquid 650 mg  650 mg oral q4h PRN Jhoana F TIFFANY Nuñez-PARTH        Or    acetaminophen (Tylenol) suppository 650 mg  650 mg rectal q4h PRN Jhoana F TIFFANY Nuñez-C        atorvastatin (Lipitor) tablet 10 mg  10 mg oral Nightly Jhoana F JERZY NuñezC        azilsartan medoxomiL tablet 1 tablet  1 tablet oral Daily Jhoana F TIFFANY Nuñez-C        ferrous sulfate (325 mg ferrous sulfate) tablet 325 mg  325 mg oral Daily with lunch Jhoana F Joaquin PA-C        heparin (porcine) injection 5,000 Units  5,000 Units subcutaneous q8h Jhoana Nuñez PA-C        [START ON 2/10/2025] levothyroxine (Synthroid, Levoxyl) tablet 88 mcg  88 mcg oral Daily Jhoana F TIFFANY Nuñez-PARTH        [START ON 2/10/2025] metoprolol succinate XL (Toprol-XL)  "24 hr tablet 25 mg  25 mg oral Daily Jhoana F Null, PA-C        morphine injection 2 mg  2 mg intravenous q4h PRN Jhoana F Null, PA-C        ondansetron (Zofran) tablet 4 mg  4 mg oral q8h PRN Jhoana F Null, PA-C        Or    ondansetron (Zofran) injection 4 mg  4 mg intravenous q8h PRN Jhoana F Null, PA-C        polyethylene glycol (Glycolax, Miralax) packet 17 g  17 g oral Daily Jhoana F Null, PA-C        tamsulosin (Flomax) 24 hr capsule 0.4 mg  0.4 mg oral Daily Jhoana F Null, PA-C            OBJECTIVE    PHYSICAL EXAM      9/13/2024     3:16 PM 9/30/2024    12:56 PM 11/6/2024    11:32 AM 1/23/2025     1:39 PM 1/31/2025     2:38 PM 2/9/2025     8:50 AM 2/9/2025    10:00 AM   Vitals   Systolic 91 127 140 150  140 122   Diastolic 50 59 75 74  61 59   BP Location Right arm Left arm    Right arm Right arm   Heart Rate 72 68 75 84  79 73   Temp 35.9 °C (96.7 °F) 36.3 °C (97.3 °F)    36.9 °C (98.4 °F)    Resp  16 18 16  18 16   Height 1.791 m (5' 10.5\")  1.791 m (5' 10.5\")  1.829 m (6') 1.803 m (5' 11\")    Weight (lb) 114.6  112 120 120 120    BMI 16.21 kg/m2  15.84 kg/m2 16.97 kg/m2 16.27 kg/m2 16.74 kg/m2    BSA (m2) 1.61 m2  1.59 m2 1.64 m2 1.66 m2 1.65 m2    Visit Report Report  Report Report         There is no height or weight on file to calculate BMI.    GENERAL: A/Ox3, NAD. Appears healthy, well nourished  PSYCHIATRIC: Mood stable, appropriate memory recall  EYES: EOM intact, no scleral icterus  CARDIAC: regular rate  LUNGS: Breathing non-labored  SKIN: no erythema, rashes, or ecchymoses     MUSCULOSKELETAL:  Laterality: Right knee Exam  This is a well-appearing patient in no acute distress.  There is mild effusion to the knee.  He is unable to straight leg raise and has a 40 degree extensor mechanism lag.  There is a palpable defect to the distal quadriceps insertion into the patella.  5/5 Strength TA, EHL, GS    NEUROVASCULAR:  - Neurovascular Status: sensation intact to light touch distally, lower extremity motor " intact  - Capillary refill brisk at extremities, Bilateral dorsalis pedis pulse 2+    Imaging: Multiple views of the affected right knee(s) demonstrate: No evidence of acute fracture or dislocation.  There is a low-lying patella concerning for quadriceps tendon rupture.   X-rays were personally reviewed and interpreted by me.  Radiology reports were reviewed by me as well, if readily available at the time.    MRI of the right knee dated 1/31/2025 reveals high-grade near full-thickness tear of the distal quadriceps tendon.    Júnior Teixeira MD, MPH  Attending Surgeon  Sports Medicine Orthopaedic Surgery  [facility] [place]

## 2025-02-09 NOTE — ED PROVIDER NOTES
HPI   Chief Complaint   Patient presents with    Knee Pain     PT PRESENTS TO ED FROM HOME VIA PRIVATE AUTO FOR RIGHT KNEE QUAD TENDON RUPTURE AFTER FALLING ON JANUARY 16. PT SENT TO ED BY ORTHO PROVIDER FOR HOSPITAL ADMISSION FOR SURGERY. PT AMBULATORY IN TRIAGE.        HPI: []  85-year-old white male with a history of hypertension, duodenal ulcer, Gilbert's syndrome, had a mechanical fall January 16th sustained right leg quad tendon rupture was seen orthopedic surgery MRI was done in outpatient basis confirmed almost complete tear of the distal quad tendon, sent to the ER by his orthopedic surgeon to be hospitalized for surgical intervention.  Patient denies any chest pressure heaviness fever chills nausea vomit diarrhea cough congestion incontinence seizures syncope or near syncope no hematemesis melena hematochezia no hemoptysis no recent falls after the original fall on January 16.      Past history: Hypertension, anemia, duodenal ulcer, Gilbert's syndrome  Social: Patient denies current tobacco alcohol drug use    REVIEW OF SYSTEMS:    GENERAL.: No weight loss, fatigue, anorexia, insomnia, fever.    EYES: No vision loss, double vision, drainage, eye pain.    ENT: No pharyngitis, dry mouth.    CARDIOPULMONARY: No chest pain, palpitations, syncope, near syncope. No shortness of breath, cough, hemoptysis.    GI: No abdominal pain, change in bowel habits, melena, hematemesis, hematochezia, nausea, vomiting, diarrhea.    : No discharge, dysuria, frequency, urgency, hematuria.    MS: No limb pain, joint pain, joint swelling.  Positive right lower extremity pain    SKIN: No rashes.    PSYCH: No depression, anxiety, suicidality, homicidality.    Review of systems is otherwise negative unless stated above or in history of present illness.  Social history, family history, allergies reviewed.  PHYSICAL EXAM:    GENERAL: Vitals noted, no distress. Alert and oriented  x 3. Non-toxic.      EENT: TMs clear. Posterior  oropharynx unremarkable. No meningismus. No LAD.     NECK: Supple. Nontender. No midline tenderness.     CARDIAC: Regular, rate, rhythm.  2+ ejection systolic murmur best heard at the left lower sternal border, no rubs or gallops. No JVD    PULMONARY: Lungs clear bilaterally with good aeration. No wheezes rales or rhonchi. No respiratory distress.     ABDOMEN: Soft, nonsurgical. Nontender. No peritoneal signs. Normoactive bowel sounds. No pulsatile masses.     EXTREMITIES: No peripheral edema. Negative Homans bilaterally, no cords.  2+ bounding pulses well-perfused.    Right lower extremity no deformity has a palpable defect over his distal quad tendon, unable to extend his knee completely.    SKIN: No rash. Intact.     NEURO: No focal neurologic deficits, NIH score of 0. Cranial nerves normal as tested from II through XII.     MEDICAL DECISION MAKING:  EKG on my interpretation shows a normal sinus rhythm left axis deviation right bundle branch block rate in the mid 70s with no ischemic change.  Chest x-ray negative, CBC with differential chemistry LFTs are normal.    Treatment in the ED: IV established  Consults: Discussed with orthopedic surgery and internal medicine  ED course: Patient remained stable hemodynamic  Impression: #1 right quadriceps tendon rupture  Plan/MDM: 85-year-old male who sustained an injury on January 16th has a known right quadriceps tendon rupture currently he stable hemodynamic.  No recent injuries after the prior injury suspicion for DVT limb ischemia infection sepsis is low.  Patient will be hospitalized to medical service for preop clearance and surgical intervention and further care.              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     Jejunal ulcer    Gilbert syndrome     Gilbert syndrome     Hyperlipidemia     Hypertension     Iron deficiency anemia secondary to blood loss (chronic)     Iron deficiency anemia due to chronic blood loss    Iron deficiency anemia secondary to blood loss (chronic) 2016    Anemia due to blood loss    Other long term (current) drug therapy 2016    High risk medication use    Personal history of (healed) traumatic fracture     History of fracture of left hip    Tinnitus, right ear 2016    Tinnitus of right ear     Past Surgical History:   Procedure Laterality Date    CHOLECYSTECTOMY  04/15/2014    Cholecystectomy    COLONOSCOPY  2015    Complete Colonoscopy    OTHER SURGICAL HISTORY  04/15/2014    Partial Gastrectomy Billroth II    US GUIDED ABSCESS DRAIN  2023    US GUIDED ABSCESS DRAIN 2023 [facility]      Family History   Problem Relation Name Age of Onset    Hypertension Father      Colon cancer Sister       Social History     Tobacco Use    Smoking status: Former     Types: Cigarettes     Start date:      Quit date:      Years since quittin.1    Smokeless tobacco: Never   Substance Use Topics    Alcohol use: Not Currently     Alcohol/week: 3.0 standard drinks of alcohol     Types: 3 Glasses of wine per week    Drug use: Never       Physical Exam   ED Triage Vitals [25 0850]   Temperature Heart Rate Respirations BP   36.9 °C (98.4 °F) 79 18 140/61      Pulse Ox Temp Source Heart Rate Source Patient Position   98 % Temporal Monitor Sitting      BP Location FiO2 (%)     Right arm --       Physical Exam      ED Course & MDM   ED Course as of 25 1032   Sun 2025   1031 Patient's preop labs are reassuring, patient we discussed orthopedic surgery and internal medicine and patient will be hospitalized for further care. [MT]      ED Course User Index  [MT] Jose Basurto MD         Diagnoses as of 25 1032   Rupture of right quadriceps tendon, initial encounter                 No data recorded     [place] Coma  Scale Score: 15 (02/09/25 0951 : Leslee Alexander RN)                           Medical Decision Making      Procedure  Procedures     Jose Basruto MD  02/09/25 5720

## 2025-02-09 NOTE — CARE PLAN
Problem: Pain - Adult  Goal: Verbalizes/displays adequate comfort level or baseline comfort level  Outcome: Progressing     Problem: Safety - Adult  Goal: Free from fall injury  Outcome: Progressing     Problem: Discharge Planning  Goal: Discharge to home or other facility with appropriate resources  Outcome: Progressing     Problem: Chronic Conditions and Co-morbidities  Goal: Patient's chronic conditions and co-morbidity symptoms are monitored and maintained or improved  Outcome: Progressing     Problem: Nutrition  Goal: Nutrient intake appropriate for maintaining nutritional needs  Outcome: Progressing   The patient's goals for the shift include      The clinical goals for the shift include      Over the shift, the patient did not make progress toward the following goals. Barriers to progression include none. Recommendations to address these barriers include n/a.

## 2025-02-10 ENCOUNTER — APPOINTMENT (OUTPATIENT)
Dept: CARDIOLOGY | Facility: HOSPITAL | Age: 85
DRG: 500 | End: 2025-02-10
Payer: COMMERCIAL

## 2025-02-10 ENCOUNTER — ANESTHESIA (OUTPATIENT)
Dept: OPERATING ROOM | Facility: HOSPITAL | Age: 85
End: 2025-02-10
Payer: COMMERCIAL

## 2025-02-10 ENCOUNTER — ANESTHESIA EVENT (OUTPATIENT)
Dept: OPERATING ROOM | Facility: HOSPITAL | Age: 85
End: 2025-02-10
Payer: COMMERCIAL

## 2025-02-10 DIAGNOSIS — I10 BENIGN ESSENTIAL HYPERTENSION: ICD-10-CM

## 2025-02-10 LAB
ANION GAP SERPL CALC-SCNC: 10 MMOL/L (ref 10–20)
AORTIC VALVE MEAN GRADIENT: 41 MMHG
AORTIC VALVE PEAK VELOCITY: 4.13 M/S
ATRIAL RATE: 64 BPM
AV PEAK GRADIENT: 68 MMHG
AVA (PEAK VEL): 0.65 CM2
AVA (VTI): 0.65 CM2
BUN SERPL-MCNC: 38 MG/DL (ref 6–23)
CALCIUM SERPL-MCNC: 8.4 MG/DL (ref 8.6–10.3)
CHLORIDE SERPL-SCNC: 107 MMOL/L (ref 98–107)
CO2 SERPL-SCNC: 25 MMOL/L (ref 21–32)
CREAT SERPL-MCNC: 1.07 MG/DL (ref 0.5–1.3)
EGFRCR SERPLBLD CKD-EPI 2021: 68 ML/MIN/1.73M*2
EJECTION FRACTION APICAL 4 CHAMBER: 67.9
EJECTION FRACTION: 68 %
ERYTHROCYTE [DISTWIDTH] IN BLOOD BY AUTOMATED COUNT: 12.9 % (ref 11.5–14.5)
GLUCOSE SERPL-MCNC: 101 MG/DL (ref 74–99)
HCT VFR BLD AUTO: 27 % (ref 41–52)
HGB BLD-MCNC: 8.6 G/DL (ref 13.5–17.5)
LEFT VENTRICLE INTERNAL DIMENSION DIASTOLE: 4.3 CM (ref 3.5–6)
LEFT VENTRICULAR OUTFLOW TRACT DIAMETER: 2.1 CM
MCH RBC QN AUTO: 31.5 PG (ref 26–34)
MCHC RBC AUTO-ENTMCNC: 31.9 G/DL (ref 32–36)
MCV RBC AUTO: 99 FL (ref 80–100)
MITRAL VALVE E/A RATIO: 1.14
NRBC BLD-RTO: 0 /100 WBCS (ref 0–0)
P AXIS: 88 DEGREES
P OFFSET: 151 MS
P ONSET: 91 MS
PLATELET # BLD AUTO: 186 X10*3/UL (ref 150–450)
POTASSIUM SERPL-SCNC: 4.6 MMOL/L (ref 3.5–5.3)
PR INTERVAL: 246 MS
Q ONSET: 214 MS
QRS COUNT: 11 BEATS
QRS DURATION: 104 MS
QT INTERVAL: 384 MS
QTC CALCULATION(BAZETT): 423 MS
QTC FREDERICIA: 410 MS
R AXIS: -72 DEGREES
RBC # BLD AUTO: 2.73 X10*6/UL (ref 4.5–5.9)
RIGHT VENTRICLE FREE WALL PEAK S': 14.8 CM/S
RIGHT VENTRICLE PEAK SYSTOLIC PRESSURE: 25.5 MMHG
SODIUM SERPL-SCNC: 137 MMOL/L (ref 136–145)
T AXIS: 70 DEGREES
T OFFSET: 406 MS
TRICUSPID ANNULAR PLANE SYSTOLIC EXCURSION: 2.7 CM
VENTRICULAR RATE: 73 BPM
WBC # BLD AUTO: 7.7 X10*3/UL (ref 4.4–11.3)

## 2025-02-10 PROCEDURE — 93306 TTE W/DOPPLER COMPLETE: CPT

## 2025-02-10 PROCEDURE — 1200000002 HC GENERAL ROOM WITH TELEMETRY DAILY

## 2025-02-10 PROCEDURE — 0LCL0ZZ EXTIRPATION OF MATTER FROM RIGHT UPPER LEG TENDON, OPEN APPROACH: ICD-10-PCS | Performed by: STUDENT IN AN ORGANIZED HEALTH CARE EDUCATION/TRAINING PROGRAM

## 2025-02-10 PROCEDURE — 2500000004 HC RX 250 GENERAL PHARMACY W/ HCPCS (ALT 636 FOR OP/ED): Performed by: NURSE ANESTHETIST, CERTIFIED REGISTERED

## 2025-02-10 PROCEDURE — 2500000004 HC RX 250 GENERAL PHARMACY W/ HCPCS (ALT 636 FOR OP/ED): Performed by: ANESTHESIOLOGY

## 2025-02-10 PROCEDURE — 27385 REPAIR OF THIGH MUSCLE: CPT | Performed by: STUDENT IN AN ORGANIZED HEALTH CARE EDUCATION/TRAINING PROGRAM

## 2025-02-10 PROCEDURE — 3700000001 HC GENERAL ANESTHESIA TIME - INITIAL BASE CHARGE: Performed by: STUDENT IN AN ORGANIZED HEALTH CARE EDUCATION/TRAINING PROGRAM

## 2025-02-10 PROCEDURE — 99223 1ST HOSP IP/OBS HIGH 75: CPT | Performed by: STUDENT IN AN ORGANIZED HEALTH CARE EDUCATION/TRAINING PROGRAM

## 2025-02-10 PROCEDURE — 3600000008 HC OR TIME - EACH INCREMENTAL 1 MINUTE - PROCEDURE LEVEL THREE: Performed by: STUDENT IN AN ORGANIZED HEALTH CARE EDUCATION/TRAINING PROGRAM

## 2025-02-10 PROCEDURE — 2720000007 HC OR 272 NO HCPCS: Performed by: STUDENT IN AN ORGANIZED HEALTH CARE EDUCATION/TRAINING PROGRAM

## 2025-02-10 PROCEDURE — 99100 ANES PT EXTEME AGE<1 YR&>70: CPT | Performed by: ANESTHESIOLOGY

## 2025-02-10 PROCEDURE — 82374 ASSAY BLOOD CARBON DIOXIDE: CPT

## 2025-02-10 PROCEDURE — 36415 COLL VENOUS BLD VENIPUNCTURE: CPT

## 2025-02-10 PROCEDURE — 99233 SBSQ HOSP IP/OBS HIGH 50: CPT | Performed by: STUDENT IN AN ORGANIZED HEALTH CARE EDUCATION/TRAINING PROGRAM

## 2025-02-10 PROCEDURE — 2500000005 HC RX 250 GENERAL PHARMACY W/O HCPCS: Performed by: NURSE ANESTHETIST, CERTIFIED REGISTERED

## 2025-02-10 PROCEDURE — 93306 TTE W/DOPPLER COMPLETE: CPT | Performed by: STUDENT IN AN ORGANIZED HEALTH CARE EDUCATION/TRAINING PROGRAM

## 2025-02-10 PROCEDURE — A27385 PR FIX QUAD/HAMSTR MUSC RUPT,PRIMARY: Performed by: NURSE ANESTHETIST, CERTIFIED REGISTERED

## 2025-02-10 PROCEDURE — 2500000002 HC RX 250 W HCPCS SELF ADMINISTERED DRUGS (ALT 637 FOR MEDICARE OP, ALT 636 FOR OP/ED)

## 2025-02-10 PROCEDURE — 3700000002 HC GENERAL ANESTHESIA TIME - EACH INCREMENTAL 1 MINUTE: Performed by: STUDENT IN AN ORGANIZED HEALTH CARE EDUCATION/TRAINING PROGRAM

## 2025-02-10 PROCEDURE — 0LQL0ZZ REPAIR RIGHT UPPER LEG TENDON, OPEN APPROACH: ICD-10-PCS | Performed by: STUDENT IN AN ORGANIZED HEALTH CARE EDUCATION/TRAINING PROGRAM

## 2025-02-10 PROCEDURE — 7100000002 HC RECOVERY ROOM TIME - EACH INCREMENTAL 1 MINUTE: Performed by: STUDENT IN AN ORGANIZED HEALTH CARE EDUCATION/TRAINING PROGRAM

## 2025-02-10 PROCEDURE — 85027 COMPLETE CBC AUTOMATED: CPT

## 2025-02-10 PROCEDURE — 2500000004 HC RX 250 GENERAL PHARMACY W/ HCPCS (ALT 636 FOR OP/ED)

## 2025-02-10 PROCEDURE — 2500000001 HC RX 250 WO HCPCS SELF ADMINISTERED DRUGS (ALT 637 FOR MEDICARE OP)

## 2025-02-10 PROCEDURE — 2500000004 HC RX 250 GENERAL PHARMACY W/ HCPCS (ALT 636 FOR OP/ED): Mod: JW | Performed by: ANESTHESIOLOGY

## 2025-02-10 PROCEDURE — A27385 PR FIX QUAD/HAMSTR MUSC RUPT,PRIMARY: Performed by: ANESTHESIOLOGY

## 2025-02-10 PROCEDURE — 2500000004 HC RX 250 GENERAL PHARMACY W/ HCPCS (ALT 636 FOR OP/ED): Mod: JZ

## 2025-02-10 PROCEDURE — 7100000001 HC RECOVERY ROOM TIME - INITIAL BASE CHARGE: Performed by: STUDENT IN AN ORGANIZED HEALTH CARE EDUCATION/TRAINING PROGRAM

## 2025-02-10 PROCEDURE — 3600000003 HC OR TIME - INITIAL BASE CHARGE - PROCEDURE LEVEL THREE: Performed by: STUDENT IN AN ORGANIZED HEALTH CARE EDUCATION/TRAINING PROGRAM

## 2025-02-10 RX ORDER — PHENYLEPHRINE HCL IN 0.9% NACL 1 MG/10 ML
SYRINGE (ML) INTRAVENOUS AS NEEDED
Status: DISCONTINUED | OUTPATIENT
Start: 2025-02-10 | End: 2025-02-10

## 2025-02-10 RX ORDER — ALBUTEROL SULFATE 0.83 MG/ML
2.5 SOLUTION RESPIRATORY (INHALATION) ONCE AS NEEDED
Status: DISCONTINUED | OUTPATIENT
Start: 2025-02-10 | End: 2025-02-10 | Stop reason: HOSPADM

## 2025-02-10 RX ORDER — LIDOCAINE HCL/PF 100 MG/5ML
SYRINGE (ML) INTRAVENOUS AS NEEDED
Status: DISCONTINUED | OUTPATIENT
Start: 2025-02-10 | End: 2025-02-10

## 2025-02-10 RX ORDER — DEXAMETHASONE SODIUM PHOSPHATE 10 MG/ML
6 INJECTION INTRAMUSCULAR; INTRAVENOUS ONCE
Status: DISCONTINUED | OUTPATIENT
Start: 2025-02-10 | End: 2025-02-10 | Stop reason: HOSPADM

## 2025-02-10 RX ORDER — FENTANYL CITRATE 50 UG/ML
INJECTION, SOLUTION INTRAMUSCULAR; INTRAVENOUS AS NEEDED
Status: DISCONTINUED | OUTPATIENT
Start: 2025-02-10 | End: 2025-02-10

## 2025-02-10 RX ORDER — CEFAZOLIN 1 G/1
INJECTION, POWDER, FOR SOLUTION INTRAVENOUS AS NEEDED
Status: DISCONTINUED | OUTPATIENT
Start: 2025-02-10 | End: 2025-02-10

## 2025-02-10 RX ORDER — AZILSARTAN KAMEDOXOMIL 40 MG/1
1 TABLET ORAL DAILY
Qty: 30 TABLET | Refills: 0 | Status: SHIPPED | OUTPATIENT
Start: 2025-02-10

## 2025-02-10 RX ORDER — ACETAMINOPHEN 10 MG/ML
1000 INJECTION, SOLUTION INTRAVENOUS ONCE
Status: COMPLETED | OUTPATIENT
Start: 2025-02-10 | End: 2025-02-10

## 2025-02-10 RX ORDER — MEPERIDINE HYDROCHLORIDE 50 MG/ML
12.5 INJECTION INTRAMUSCULAR; INTRAVENOUS; SUBCUTANEOUS EVERY 10 MIN PRN
Status: DISCONTINUED | OUTPATIENT
Start: 2025-02-10 | End: 2025-02-10 | Stop reason: HOSPADM

## 2025-02-10 RX ORDER — ONDANSETRON HYDROCHLORIDE 2 MG/ML
4 INJECTION, SOLUTION INTRAVENOUS ONCE AS NEEDED
Status: DISCONTINUED | OUTPATIENT
Start: 2025-02-10 | End: 2025-02-10 | Stop reason: HOSPADM

## 2025-02-10 RX ORDER — ETOMIDATE 2 MG/ML
INJECTION INTRAVENOUS AS NEEDED
Status: DISCONTINUED | OUTPATIENT
Start: 2025-02-10 | End: 2025-02-10

## 2025-02-10 RX ADMIN — Medication 200 MCG: at 13:21

## 2025-02-10 RX ADMIN — ATORVASTATIN CALCIUM 10 MG: 10 TABLET, FILM COATED ORAL at 20:19

## 2025-02-10 RX ADMIN — FERROUS SULFATE TAB 325 MG (65 MG ELEMENTAL FE) 325 MG: 325 (65 FE) TAB at 11:59

## 2025-02-10 RX ADMIN — ACETAMINOPHEN 650 MG: 325 TABLET, FILM COATED ORAL at 20:19

## 2025-02-10 RX ADMIN — HEPARIN SODIUM 5000 UNITS: 5000 INJECTION INTRAVENOUS; SUBCUTANEOUS at 17:09

## 2025-02-10 RX ADMIN — HYDROMORPHONE HYDROCHLORIDE 0.5 MG: 1 INJECTION, SOLUTION INTRAMUSCULAR; INTRAVENOUS; SUBCUTANEOUS at 15:09

## 2025-02-10 RX ADMIN — HEPARIN SODIUM 5000 UNITS: 5000 INJECTION INTRAVENOUS; SUBCUTANEOUS at 02:14

## 2025-02-10 RX ADMIN — FENTANYL CITRATE 50 MCG: 50 INJECTION, SOLUTION INTRAMUSCULAR; INTRAVENOUS at 13:10

## 2025-02-10 RX ADMIN — TAMSULOSIN HYDROCHLORIDE 0.4 MG: 0.4 CAPSULE ORAL at 10:02

## 2025-02-10 RX ADMIN — HYDROMORPHONE HYDROCHLORIDE 0.5 MG: 1 INJECTION, SOLUTION INTRAMUSCULAR; INTRAVENOUS; SUBCUTANEOUS at 14:50

## 2025-02-10 RX ADMIN — HYDROMORPHONE HYDROCHLORIDE 0.5 MG: 1 INJECTION, SOLUTION INTRAMUSCULAR; INTRAVENOUS; SUBCUTANEOUS at 15:18

## 2025-02-10 RX ADMIN — FENTANYL CITRATE 50 MCG: 50 INJECTION, SOLUTION INTRAMUSCULAR; INTRAVENOUS at 13:35

## 2025-02-10 RX ADMIN — FENTANYL CITRATE 50 MCG: 50 INJECTION, SOLUTION INTRAMUSCULAR; INTRAVENOUS at 14:15

## 2025-02-10 RX ADMIN — LIDOCAINE HYDROCHLORIDE 60 MG: 20 INJECTION, SOLUTION INTRAVENOUS at 13:05

## 2025-02-10 RX ADMIN — ACETAMINOPHEN 1000 MG: 10 INJECTION INTRAVENOUS at 15:47

## 2025-02-10 RX ADMIN — ETOMIDATE INJECTION 16 MG: 2 SOLUTION INTRAVENOUS at 13:05

## 2025-02-10 RX ADMIN — SODIUM CHLORIDE, POTASSIUM CHLORIDE, SODIUM LACTATE AND CALCIUM CHLORIDE: 600; 310; 30; 20 INJECTION, SOLUTION INTRAVENOUS at 13:01

## 2025-02-10 RX ADMIN — ONDANSETRON 4 MG: 2 INJECTION INTRAMUSCULAR; INTRAVENOUS at 14:15

## 2025-02-10 RX ADMIN — FENTANYL CITRATE 50 MCG: 50 INJECTION, SOLUTION INTRAMUSCULAR; INTRAVENOUS at 13:05

## 2025-02-10 RX ADMIN — MORPHINE SULFATE 2 MG: 2 INJECTION, SOLUTION INTRAMUSCULAR; INTRAVENOUS at 20:19

## 2025-02-10 RX ADMIN — CEFAZOLIN 1 G: 1 INJECTION, POWDER, FOR SOLUTION INTRAMUSCULAR; INTRAVENOUS at 13:13

## 2025-02-10 RX ADMIN — LEVOTHYROXINE SODIUM 88 MCG: 88 TABLET ORAL at 10:02

## 2025-02-10 RX ADMIN — Medication 200 MCG: at 13:26

## 2025-02-10 RX ADMIN — HYDROMORPHONE HYDROCHLORIDE 0.2 MG: 1 INJECTION, SOLUTION INTRAMUSCULAR; INTRAVENOUS; SUBCUTANEOUS at 14:59

## 2025-02-10 SDOH — HEALTH STABILITY: MENTAL HEALTH: CURRENT SMOKER: 0

## 2025-02-10 ASSESSMENT — COGNITIVE AND FUNCTIONAL STATUS - GENERAL
DAILY ACTIVITIY SCORE: 24
MOBILITY SCORE: 24

## 2025-02-10 ASSESSMENT — PAIN SCALES - GENERAL
PAINLEVEL_OUTOF10: 0 - NO PAIN
PAINLEVEL_OUTOF10: 0 - NO PAIN
PAINLEVEL_OUTOF10: 8
PAINLEVEL_OUTOF10: 9
PAINLEVEL_OUTOF10: 10 - WORST POSSIBLE PAIN
PAINLEVEL_OUTOF10: 2
PAINLEVEL_OUTOF10: 8
PAINLEVEL_OUTOF10: 0 - NO PAIN
PAIN_LEVEL: 2

## 2025-02-10 ASSESSMENT — PAIN DESCRIPTION - LOCATION
LOCATION: KNEE
LOCATION: LEG
LOCATION: KNEE
LOCATION: KNEE

## 2025-02-10 ASSESSMENT — PAIN - FUNCTIONAL ASSESSMENT
PAIN_FUNCTIONAL_ASSESSMENT: 0-10
PAIN_FUNCTIONAL_ASSESSMENT: UNABLE TO SELF-REPORT
PAIN_FUNCTIONAL_ASSESSMENT: 0-10

## 2025-02-10 ASSESSMENT — PAIN DESCRIPTION - ORIENTATION
ORIENTATION: RIGHT

## 2025-02-10 NOTE — CARE PLAN
The patient's goals for the shift include      The clinical goals for the shift include Patient will remain comfortable throughout this shift.      Problem: Pain - Adult  Goal: Verbalizes/displays adequate comfort level or baseline comfort level  Outcome: Progressing     Problem: Safety - Adult  Goal: Free from fall injury  Outcome: Progressing     Problem: Discharge Planning  Goal: Discharge to home or other facility with appropriate resources  Outcome: Progressing     Problem: Chronic Conditions and Co-morbidities  Goal: Patient's chronic conditions and co-morbidity symptoms are monitored and maintained or improved  Outcome: Progressing     Problem: Nutrition  Goal: Nutrient intake appropriate for maintaining nutritional needs  Outcome: Progressing     Problem: Fall/Injury  Goal: Not fall by end of shift  Outcome: Progressing  Goal: Be free from injury by end of the shift  Outcome: Progressing  Goal: Verbalize understanding of personal risk factors for fall in the hospital  Outcome: Progressing  Goal: Verbalize understanding of risk factor reduction measures to prevent injury from fall in the home  Outcome: Progressing  Goal: Use assistive devices by end of the shift  Outcome: Progressing  Goal: Pace activities to prevent fatigue by end of the shift  Outcome: Progressing

## 2025-02-10 NOTE — OP NOTE
REPAIR OF RIGHT QUADRACEPS TENDON (R) Operative Note     Date: 2/10/2025  OR Location: [facility]    Name: Israel Franks, : 1940, Age: 85 y.o., MRN: [id number], Sex: male    Diagnosis  Pre-op Diagnosis      * Rupture of right quadriceps tendon, initial encounter [S76.111A] Post-op Diagnosis     * Rupture of right quadriceps tendon, initial encounter [S76.111A]     Procedures  REPAIR OF RIGHT QUADRACEPS TENDON  21300 - IN SUTURE QUADRICEPS/HAMSTRING RUPTURE PRIMARY      Surgeons      * Torsten Teixeira - Primary    Resident/Fellow/Other Assistant:  Surgeons and Role:  * No surgeons found with a matching role *    Staff:   Circulator: Carlos Smith Person: Nelly  Surgical Assistant: Mariaa    Anesthesia Staff: Anesthesiologist: Alfonzo Forrester MD  CRNA: DALIA Jhaveri-RASHMI    Procedure Summary  Anesthesia: General  ASA: III  Estimated Blood Loss: 20mL  Intra-op Medications: * Intraprocedure medication information is unavailable because the case start and end events have not been set *           Anesthesia Record               Intraprocedure I/O Totals          Intake    LR bolus 1000.00 mL    Total Intake 1000 mL          Specimen: No specimens collected              Drains and/or Catheters: * None in log *    Tourniquet Times:     Total Tourniquet Time Documented:  Thigh (Right) - 44 minutes  Total: Thigh (Right) - 44 minutes        Indications: Israel Franks is an 85 y.o. male who is having surgery for Rupture of right quadriceps tendon, initial encounter [S76.111A]. This is an 85-year-old male with right quadriceps tendon rupture and extensor mechanism lag. At this time, we have discussed nonoperative versus operative management options. Based on his subjective instability, full-thickness rupture with quadriceps tear and extensor mechanism lag, and concern for dysfunction and/or instability during ambulation I recommended operative repair. Risk of the procedure include but are not limited to infection,  nerve damage, blood loss, persistent pain, failure of repair.     The patient was seen in the preoperative area. The risks, benefits, complications, treatment options, non-operative alternatives, expected recovery and outcomes were discussed with the patient. The possibilities of reaction to medication, pulmonary aspiration, injury to surrounding structures, bleeding, recurrent infection, the need for additional procedures, failure to diagnose a condition, and creating a complication requiring transfusion or operation were discussed with the patient. The patient concurred with the proposed plan, giving informed consent.  The site of surgery was properly noted/marked if necessary per policy. The patient has been actively warmed in preoperative area. Preoperative antibiotics have been ordered and given within 1 hours of incision. Venous thrombosis prophylaxis have been ordered including unilateral sequential compression device    Procedure Details:   PROCEDURE:  Israel Franks is a 85 y.o. male with history, physical examination, and imaging findings consistent with the aforementioned diagnosis.  Operative and nonoperative options were discussed with the patient in my office and we ultimately agreed that surgery would provide the highest likelihood of symptomatic relief and functional improvement.  The risks and benefits of surgery as well as the postoperative rehabilitation plan were reviewed. The patient voiced a good understanding of treatment options, risks and benefits, and alternatives to surgery. The patient was given the opportunity to ask questions, which were all answered to the best of my ability and to their satisfaction. The patient wished to proceed with surgical intervention.     On the day of surgery, the patient was seen in the preoperative area.  I confirmed their identity by name and birthday. We reviewed and confirmed the surgical consent including laterality.  The right thigh was identified and  marked with my initials.  We re-reviewed the risks and benefits of the procedure.  Risks include but are not limited to infection, blood loss, nerve damage, blood clot, persistent pain, and failure of repair.  I answered all additional questions to their satisfaction.      The patient was taken to the operating room in stable condition.  After induction of anesthesia, the patient was positioned prone with a non-sterile tourniquet placed on the operative proximal thigh.    A surgical time out was performed where I confirmed the correct patient, laterality and procedure, reaffirmed the consent, noted appropriate imaging studies were in the room, and that preoperative antibiotics had been given within recommended timeframe prior to skin incision.        A skin incision was made roughly 3 fingerbreadths superior to the patella down to the inferior pole of the patella.  Initial dissection was carried out with a scalpel with deeper dissection with Metzenbaum scissors was used to create free tissue flaps.  We were immediately able to identify the quadriceps tendon rupture with the evacuation of hematoma.     The tendon appeared completely torn and tendinotic leaving irregular ends of tendon. Nonviable tissue was removed.  We then used a #2 FiberTape sutures in a running [place] fashion moving up/proximally and down/distally through viable quadriceps tendon tissue along the medial side.  The limbs of the suture were exiting the distal aspect of the quadriceps tendon.  I used a second #2 FiberWire suture in a running [place] fashion in the  proximal/lateral portion of the quadriceps tendon exiting at the rupture site.     At this time, we utilized a Beath pin to place 3 drill tunnels from the superior to inferior aspect of the patella with careful attention to not penetrate the articular surface.  The eyelets of the Beath pins on the proximal side of the patella and was subsequently passed 1 suture from the medial and 1 suture  from the lateral [place] repair through the central tunnel to exit the patella inferiorly.  This was then repeated medially bringing the medial suture limb through its respective tunnel and again laterally with the lateral suture limb's respective tunnel. The knee was placed in full extension and 1 limb from the medial and 1 limb from the central tunnel were then tied down underneath the patellar tendon. This was then repeated with 1 limb from the lateral tunnel and 1 limb from the central tunnel securing fixation of the quadriceps tendon proximally against the superior aspect of the patella.  The knee was brought through a full range of motion without gapping of the repair site. The wound was copiously irrigated.  Additional #2 FiberWire was utilized to close the retinacular flaps on the medial and lateral aspect of the distal quadriceps extending medially and laterally to establish secure fixation and circumferentially close the joint.     The wound was lavaged with saline to remove any debris and instruments were removed. The tourniquet was let down and hemostasis was achieved. Attention was turned to closure which was performed with 0 Vicryl in the deep layer.  Deep dermal with 2-0 Vicryls sutures and a running 3-0 monocryl for skin. The wound was covered with dermabond, leroy/abd pads, and webril. Needle and sponge counts were correct.     Once draping was removed, the patient was placed into a knee immobilizer and taken to the recovery room in a stable condition with plan for ambulatory discharge to home, weightbearing as tolerated in the knee immobilizer, and with aspirin for DVT prophylaxis.      Complications:  None; patient tolerated the procedure well.    Disposition: PACU - hemodynamically stable.  Condition: stable     Task Performed by CHENTE First Assist or Physician Assistant:   A first assist was necessary to assist on this case due to the nature of the case and difficulty. During the case the first  assist served as my assist by managing preoperative positioning, intra-operative retraction, closure.      Additional Details: None    Attending Attestation: I was present and scrubbed for the key portions of the procedure.    Torsten Teixeira  Phone Number: [phone]

## 2025-02-10 NOTE — ANESTHESIA PREPROCEDURE EVALUATION
Patient: Israel Franks    Procedure Information       Date/Time: 02/10/25 0589    Procedure: REPAIR OF RIGHT QUADRACEPS TENDON (Right: Knee)    Location: [facility] 08 / [facility]    Surgeons: Torsten Teixeira MD MPH            Relevant Problems   Anesthesia (within normal limits)      Cardiac   (+) Aortic valve sclerosis   (+) Benign essential hypertension   (+) Essential hypertension   (+) Hyperlipidemia   (+) Nonrheumatic aortic valve stenosis   (+) PVC (premature ventricular contraction)   (+) Sinus arrhythmia      Neuro   (+) Depression      GI   (+) Dysphagia      /Renal   (+) BPH (benign prostatic hyperplasia)   (+) Benign prostatic hyperplasia with urinary frequency   (+) Chronic renal impairment, stage 2 (mild)      Liver   (+) Calculus of common bile duct with acute pancreatitis (HHS-HCC)      Endocrine   (+) Hypothyroidism      Hematology   (+) Anemia   (+) 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   (+) Megaloblastic anemia      Musculoskeletal   (+) Kyphoscoliosis       Clinical information reviewed:   Tobacco  Allergies  Meds   Med Hx  Surg Hx   Fam Hx  Soc Hx        NPO Detail:  NPO/Void Status  Date of Last Liquid: 02/10/25  Time of Last Liquid: 1130 (few sips of water)  Date of Last Solid: 02/09/25  Time of Last Solid: 2000         Physical Exam    Airway  Mallampati: II  TM distance: >3 FB  Neck ROM: full     Cardiovascular   Rate: normal  (+) murmur     Dental - normal exam     Pulmonary - normal exam     Abdominal - normal exam             Anesthesia Plan    History of general anesthesia?: yes  History of complications of general anesthesia?: no    ASA 3     general     The patient is not a current smoker.    intravenous induction   Postoperative administration of opioids is intended.  Trial extubation is planned.  Anesthetic plan and risks discussed with patient.  Use of blood products discussed with patient who consented to blood products.    Plan discussed with CAA.

## 2025-02-10 NOTE — CONSULTS
Consults  History Of Present Illness:    Israel Franks is a 85 y.o. male past patient notable for aortic valve stenosis graded as moderate to severe based on most recent echocardiogram, mild to moderate aortic valve regurgitation, hypertension, hyperlipidemia, hypothyroidism, GERD, chronic iron deficiency anemia, CKD, comparison syndrome presented to the hospital with acute knee pain.  Apparently patient was recently seen in orthopedic office due to right knee pain on 20 January.  He apparently slipped while walking into his house on 16th January twisted his knee.  An MRI Performed 1/28/2025 displayed high-grade near full-thickness tear of the distal quadricep tendon.  No preceding complaints of chest pain or shortness of breath.  He is relatively functional at baseline.  No lightheadedness dizziness or syncopal events.  Follows with Dr.Jeremy Miranda for cardiovascular care.  Most recently seen on 1 August.    His previous echocardiogram from 7/12/2024 showed EF within normal limits moderate severe aortic valve stenosis based on a V-max of 3.95 mean gradient of 35, mild to moderate AI.     In the ED initial vital signs with a heart rate of 79 blood pressure 140/61 respiratory rate of 18 with saturation is 90% on room air.  Present labs notable for BUN/creatinine 38/1.26 And fairly unremarkable CMP.  TSH was 4.71.  He has had a relative macrocytic anemia with a hemoglobin 11.5.  His UA is negative.  Chest x-ray shows mild cardiomegaly mild vascular congestion and evidence of a delay emphysema.     Last Recorded Vitals:  Vitals:    02/10/25 0020 02/10/25 0424 02/10/25 0727 02/10/25 0809   BP: 92/51 (!) 94/44 88/53 100/52   BP Location: Right arm Right arm Right arm    Patient Position: Lying Lying Lying    Pulse: 69 58 61    Resp: 18 18 (!) 1    Temp: 37.4 °C (99.3 °F) 36.3 °C (97.3 °F) 36.9 °C (98.4 °F)    TempSrc: Temporal Temporal     SpO2: 96% 97% 96%    Weight:       Height:           Last Labs:  CBC - 2/10/2025:   5:09 AM  7.7 8.6 186    27.0      CMP - 2/10/2025:  5:09 AM  8.4 7.5 29 --- 1.1   _ 4.1 25 81      PTT - No results in last year.  1.0   11.5 _     LDL Calculated   Date/Time Value Ref Range Status   03/08/2024 12:29 PM 77 <=99 mg/dL Final     Comment:                                 Near   Borderline      AGE      Desirable  Optimal    High     High     Very High     0-19 Y     0 - 109     ---    110-129   >/= 130     ----    20-24 Y     0 - 119     ---    120-159   >/= 160     ----      >24 Y     0 -  99   100-129  130-159   160-189     >/=190       VLDL   Date/Time Value Ref Range Status   03/08/2024 12:29 PM 14 0 - 40 mg/dL Final   07/01/2023 11:30 AM 12 0 - 40 mg/dL Final   11/04/2022 01:07 PM 14 0 - 40 mg/dL Final   07/29/2022 12:09 PM 17 0 - 40 mg/dL Final      Last I/O:  I/O last 3 completed shifts:  In: 220 (4 mL/kg) [P.O.:220]  Out: 300 (5.5 mL/kg) [Urine:300 (0.2 mL/kg/hr)]  Weight: 54.4 kg     Past Cardiology Tests (Last 3 Years):  EKG:  ECG 12 lead 02/09/2025 (Preliminary)      ECG 12 lead (Clinic Performed) 08/01/2024    Echo:  Transthoracic Echo Complete 07/12/2024    Ejection Fractions:  EF   Date/Time Value Ref Range Status   07/12/2024 02:22 PM 58 %      Cath:  No results found for this or any previous visit from the past 1095 days.    Stress Test:  No results found for this or any previous visit from the past 1095 days.    Cardiac Imaging:  No results found for this or any previous visit from the past 1095 days.      Past Medical History:  He has a past medical history of Epigastric pain (12/28/2016), Finger injury (06/12/2024), Gastrojejunal ulcer, unspecified as acute or chronic, without hemorrhage or perforation, Gilbert syndrome, Hyperlipidemia, Hypertension, Iron deficiency anemia secondary to blood loss (chronic), Iron deficiency anemia secondary to blood loss (chronic) (12/27/2016), Other long term (current) drug therapy (06/17/2016), Personal history of (healed) traumatic fracture, and  Tinnitus, right ear (06/17/2016).    Past Surgical History:  He has a past surgical history that includes Cholecystectomy (04/15/2014); Other surgical history (04/15/2014); Colonoscopy (06/09/2015); and US guided abscess drain (7/6/2023).      Social History:  He reports that he quit smoking about 62 years ago. His smoking use included cigarettes. He started smoking about 56 years ago. He has never used smokeless tobacco. He reports that he does not currently use alcohol after a past usage of about 3.0 standard drinks of alcohol per week. He reports that he does not use drugs.    Family History:  Family History   Problem Relation Name Age of Onset    Hypertension Father      Colon cancer Sister          Allergies:  Patient has no known allergies.    Inpatient Medications:  Scheduled medications   Medication Dose Route Frequency    atorvastatin  10 mg oral Nightly    ferrous sulfate (325 mg ferrous sulfate)  325 mg oral Daily with lunch    heparin (porcine)  5,000 Units subcutaneous q8h    levothyroxine  88 mcg oral Daily    losartan  50 mg oral Daily    metoprolol succinate XL  25 mg oral Daily    polyethylene glycol  17 g oral Daily    tamsulosin  0.4 mg oral Daily     PRN medications   Medication    acetaminophen    Or    acetaminophen    Or    acetaminophen    morphine    ondansetron    Or    ondansetron     Continuous Medications   Medication Dose Last Rate     Outpatient Medications:  Current Outpatient Medications   Medication Instructions    atorvastatin (LIPITOR) 10 mg, oral, Daily    calcium citrate/vitamin D3 (CITRACAL REGULAR ORAL) 3 tablets, Nightly    denosumab (PROLIA) 60 mg, subcutaneous, Every 6 months    Edarbi 40 mg tablet 1 tablet, oral, Daily    ferrous sulfate 325 mg, Daily with lunch    hydrocortisone-pramoxine (Analpram-HC) 1-1 % rectal cream rectal, 2 times daily    levothyroxine (SYNTHROID, LEVOXYL) 88 mcg, oral, Daily    metoprolol succinate XL (TOPROL-XL) 50 mg, oral, Daily    multivitamin  with minerals tablet 1 tablet, Daily with lunch    tamsulosin (FLOMAX) 0.4 mg, oral, Daily    UNABLE TO FIND 1 capsule, 3 times daily    vitamins A,C,E-zinc-copper (ICaps AREDS) 4,296 mcg-226 mg-90 mg capsule 1 capsule, 2 times daily       Physical Exam:  General: No acute distress,  A&O x3  Skin: Warm and dry  Neck: JVD is not elevated  ENT: Moist mucous membranes no lesions appreciated  Pulmonary: CTAB  Cards: Regular rate rhythm, 3 out of 6 crescendo decrescendo mid peaking murmur noted across aortic band, no gallops or rubs appreciated normal S1-S2  Abdomen: Soft nontender nondistended  Extremities: No edema or cyanosis  Psych: Appropriate mood and affect        Assessment/Plan     1.  Preoperative risk assessment: Plan knee surgery for torn quadricep tendon.  The surgery is low risk from a cardiovascular subjective.  RCRI score of 0-1.  Baseline ECG of sinus rhythm first-degree AV block with PVCs underlying left anterior fascicular block.  Asymptomatic.  Functional at baseline.  No evidence of heart failure.  Known history of aortic valve disease graded as moderate to severe.  Repeat echocardiogram ordered to reassess valvular disease and ventricular function.  If stable then patient's overall risk of major adverse cardiac events would be intermediate and therefore nonprohibitive to proceed with surgery.    -Be aware patients with significant aortic valve disease are generally preload dependent and requires close intraoperative and postoperative monitoring of hemodynamics     2.  Aortic stenosis, moderate to severe by most recent assessment.  Asymptomatic.  Repeat echo ordered.    (This note was generated with voice recognition software and may contain errors including spelling, grammar, syntax and missed recognition of what was dictated, of which may not have been fully corrected)     Code Status:  Full Code    Jarrell Weber MD PhD

## 2025-02-10 NOTE — CARE PLAN
Problem: Pain - Adult  Goal: Verbalizes/displays adequate comfort level or baseline comfort level  Outcome: Progressing     Problem: Safety - Adult  Goal: Free from fall injury  Outcome: Progressing     Problem: Discharge Planning  Goal: Discharge to home or other facility with appropriate resources  Outcome: Progressing     Problem: Chronic Conditions and Co-morbidities  Goal: Patient's chronic conditions and co-morbidity symptoms are monitored and maintained or improved  Outcome: Progressing     Problem: Nutrition  Goal: Nutrient intake appropriate for maintaining nutritional needs  Outcome: Progressing     Problem: Fall/Injury  Goal: Not fall by end of shift  Outcome: Progressing  Goal: Be free from injury by end of the shift  Outcome: Progressing  Goal: Verbalize understanding of personal risk factors for fall in the hospital  Outcome: Progressing  Goal: Verbalize understanding of risk factor reduction measures to prevent injury from fall in the home  Outcome: Progressing  Goal: Use assistive devices by end of the shift  Outcome: Progressing  Goal: Pace activities to prevent fatigue by end of the shift  Outcome: Progressing   The patient's goals for the shift include      The clinical goals for the shift include Maintain safety

## 2025-02-10 NOTE — PROGRESS NOTES
Physical Therapy                 Therapy Communication Note    Patient Name: Israel Franks  MRN: [id number]  Department: [facility] 9  Room: [facility]  Today's Date: 2/10/2025     Discipline: Physical Therapy      Missed Visit Reason: Missed Visit Reason: Patient placed on medical hold (patient scheduled for surgery this date. will await post op orders.)    Missed Time: Attempt

## 2025-02-10 NOTE — PROGRESS NOTES
"Israel Franks is a 85 y.o. male on day 1 of admission presenting with Rupture of right quadriceps tendon, initial encounter.    Subjective   This is an 85-year-old male admitted for right quadriceps tendon rupture.  No acute events or pain overnight.  He has been evaluated by the medical and cardiology teams.  No acute complaints or concerns today.       Objective     Physical Exam  Laterality: Right knee Exam  This is a well-appearing patient in no acute distress.  There is mild effusion to the knee.  He is unable to straight leg raise and has a 40 degree extensor mechanism lag.  There is a palpable defect to the distal quadriceps insertion into the patella.  5/5 Strength TA, EHL, GS     NEUROVASCULAR:  - Neurovascular Status: sensation intact to light touch distally, lower extremity motor intact  - Capillary refill brisk at extremities, Bilateral dorsalis pedis pulse 2+    Last Recorded Vitals  Blood pressure 96/59, pulse 69, temperature 36.9 °C (98.4 °F), temperature source Temporal, resp. rate 16, height 1.803 m (5' 10.98\"), weight 54.4 kg (120 lb), SpO2 98%.  Intake/Output last 3 Shifts:  I/O last 3 completed shifts:  In: 220 (4 mL/kg) [P.O.:220]  Out: 300 (5.5 mL/kg) [Urine:300 (0.2 mL/kg/hr)]  Weight: 54.4 kg     Relevant Results      Scheduled medications  atorvastatin, 10 mg, oral, Nightly  ferrous sulfate (325 mg ferrous sulfate), 325 mg, oral, Daily with lunch  heparin (porcine), 5,000 Units, subcutaneous, q8h  levothyroxine, 88 mcg, oral, Daily  [Transfer Hold] losartan, 50 mg, oral, Daily  metoprolol succinate XL, 25 mg, oral, Daily  polyethylene glycol, 17 g, oral, Daily  tamsulosin, 0.4 mg, oral, Daily      Continuous medications     PRN medications  PRN medications: acetaminophen **OR** acetaminophen **OR** acetaminophen, morphine, ondansetron **OR** ondansetron  Results for orders placed or performed during the hospital encounter of 02/09/25 (from the past 24 hours)   Urinalysis with Reflex Culture " and Microscopic   Result Value Ref Range    Color, Urine Yellow Light-Yellow, Yellow, Dark-Yellow    Appearance, Urine Clear Clear    Specific Gravity, Urine 1.019 1.005 - 1.035    pH, Urine 5.0 5.0, 5.5, 6.0, 6.5, 7.0, 7.5, 8.0    Protein, Urine NEGATIVE NEGATIVE, 10 (TRACE), 20 (TRACE) mg/dL    Glucose, Urine Normal Normal mg/dL    Blood, Urine NEGATIVE NEGATIVE mg/dL    Ketones, Urine NEGATIVE NEGATIVE mg/dL    Bilirubin, Urine NEGATIVE NEGATIVE mg/dL    Urobilinogen, Urine Normal Normal mg/dL    Nitrite, Urine NEGATIVE NEGATIVE    Leukocyte Esterase, Urine NEGATIVE NEGATIVE   CBC   Result Value Ref Range    WBC 7.7 4.4 - 11.3 x10*3/uL    nRBC 0.0 0.0 - 0.0 /100 WBCs    RBC 2.73 (L) 4.50 - 5.90 x10*6/uL    Hemoglobin 8.6 (L) 13.5 - 17.5 g/dL    Hematocrit 27.0 (L) 41.0 - 52.0 %    MCV 99 80 - 100 fL    MCH 31.5 26.0 - 34.0 pg    MCHC 31.9 (L) 32.0 - 36.0 g/dL    RDW 12.9 11.5 - 14.5 %    Platelets 186 150 - 450 x10*3/uL   Basic metabolic panel   Result Value Ref Range    Glucose 101 (H) 74 - 99 mg/dL    Sodium 137 136 - 145 mmol/L    Potassium 4.6 3.5 - 5.3 mmol/L    Chloride 107 98 - 107 mmol/L    Bicarbonate 25 21 - 32 mmol/L    Anion Gap 10 10 - 20 mmol/L    Urea Nitrogen 38 (H) 6 - 23 mg/dL    Creatinine 1.07 0.50 - 1.30 mg/dL    eGFR 68 >60 mL/min/1.73m*2    Calcium 8.4 (L) 8.6 - 10.3 mg/dL   Transthoracic Echo (TTE) Complete   Result Value Ref Range    BSA 1.65 m2                            Assessment/Plan   Assessment & Plan  Rupture of right quadriceps tendon, initial encounter    This is an 85-year-old male with right quadriceps tendon rupture and extensor mechanism lag. At this time, we have discussed nonoperative versus operative management options. Based on his subjective instability, full-thickness rupture with quadriceps tear and extensor mechanism lag, and concern for dysfunction and/or instability during ambulation I recommended operative repair. Risk of the procedure include but are not limited  to infection, nerve damage, blood loss, persistent pain, failure of repair.     - Plan for right quadriceps repair today, 2/10/2025.      Torsten Teixeira MD MPH

## 2025-02-10 NOTE — PROGRESS NOTES
Occupational Therapy                 Therapy Communication Note    Patient Name: Israel Franks  MRN: [id number]  Department: [facility] 9  Room: [facility]904-  Today's Date: 2/10/2025     Discipline: Occupational Therapy    OT Missed Visit: Yes     Missed Visit Reason: Missed Visit Reason: Patient placed on medical hold (patient scheduled for surgery this date. will await post op orders.)

## 2025-02-10 NOTE — PROGRESS NOTES
02/10/25 1046   Discharge Planning   Living Arrangements Alone   Support Systems Friends/neighbors   Type of Residence Private residence   Who is requesting discharge planning? Provider   Expected Discharge Disposition Othe   Stroke Family Assessment   Stroke Family Assessment Needed No   Intensity of Service   Intensity of Service 0-30 min     Met with pt, pt admit for rupture of rt quad.  Ortho consulted, plan is for OR today.  Pt lives alone, has a friend from [facility] , otherwise no family.  Tentative plan is for home with [facility] care,[facility] under PCP Dr White.  PTA pt driving.  Other dc options discussed and will await therapy evaluations.   Will follow.  Home address and insurance verified.   Aide Griffin RN TCC

## 2025-02-10 NOTE — ANESTHESIA PROCEDURE NOTES
Airway  Date/Time: 2/10/2025 1:06 PM  Urgency: elective    Airway not difficult    Staffing  Performed: CRNA   Authorized by: Alfonzo Forrester MD    Performed by: DALIA Jhaveri-RASHMI  Patient location during procedure: OR    Indications and Patient Condition  Indications for airway management: anesthesia  Spontaneous ventilation: present  Sedation level: deep  Preoxygenated: yes  Patient position: sniffing  Mask difficulty assessment: 0 - not attempted  Planned trial extubation    Final Airway Details  Final airway type: supraglottic airway      Successful airway: Size 4     Number of attempts at approach: 1

## 2025-02-10 NOTE — ANESTHESIA POSTPROCEDURE EVALUATION
Patient: Israel Franks    Procedure Summary       Date: 02/10/25 Room / Location: [facility] OR 08 / Virtual [facility] OR    Anesthesia Start: 1257 Anesthesia Stop: 1438    Procedure: REPAIR OF RIGHT QUADRACEPS TENDON (Right: Knee) Diagnosis:       Rupture of right quadriceps tendon, initial encounter      (Rupture of right quadriceps tendon, initial encounter [S76.111A])    Surgeons: Torsten Teixeira MD MPH Responsible Provider: Alfonzo Forrester MD    Anesthesia Type: general ASA Status: 3            Anesthesia Type: general    Vitals Value Taken Time   /62 02/10/25 1435   Temp 36.7 02/10/25 1438   Pulse 64 02/10/25 1437   Resp 16 02/10/25 1437   SpO2 100 % 02/10/25 1437   Vitals shown include unfiled device data.    Anesthesia Post Evaluation    Patient location during evaluation: PACU  Patient participation: complete - patient participated  Level of consciousness: awake and alert  Pain score: 2  Pain management: adequate  Airway patency: patent  Cardiovascular status: acceptable  Respiratory status: acceptable  Hydration status: acceptable  Postoperative Nausea and Vomiting: none        No notable events documented.

## 2025-02-10 NOTE — BRIEF OP NOTE
Date: 2025 - 2/10/2025  OR Location: [facility] OR    Name: Israel Franks, : 1940, Age: 85 y.o., MRN: [id number], Sex: male    Diagnosis  Pre-op Diagnosis      * Rupture of right quadriceps tendon, initial encounter [S76.111A] Post-op Diagnosis     * Rupture of right quadriceps tendon, initial encounter [S76.111A]     Procedures  REPAIR OF RIGHT QUADRACEPS TENDON  43538 - VA SUTURE QUADRICEPS/HAMSTRING RUPTURE PRIMARY      Surgeons      * Torsten Teixeira - Primary    Resident/Fellow/Other Assistant:  Surgeons and Role:  * No surgeons found with a matching role *    Staff:   Circulator: Carlos Smith Person: Nelly    Anesthesia Staff: Anesthesiologist: Alfonzo Forrester MD  CRNA: DALIA Jhaveri-RASHMI    Procedure Summary  Anesthesia: General  ASA: III  Estimated Blood Loss: 10mL  Intra-op Medications: * Intraprocedure medication information is unavailable because the case start and end events have not been set *           Anesthesia Record               Intraprocedure I/O Totals       None           Specimen: No specimens collected       Complications:  None; patient tolerated the procedure well.     Disposition: PACU - hemodynamically stable.  Condition: stable  Specimens Collected: No specimens collected  Attending Attestation: I was present and scrubbed for the key portions of the procedure.    Torsten Teixeira  Phone Number: [phone]

## 2025-02-11 ENCOUNTER — APPOINTMENT (OUTPATIENT)
Dept: CARDIOLOGY | Facility: HOSPITAL | Age: 85
DRG: 500 | End: 2025-02-11
Payer: COMMERCIAL

## 2025-02-11 PROCEDURE — 2500000002 HC RX 250 W HCPCS SELF ADMINISTERED DRUGS (ALT 637 FOR MEDICARE OP, ALT 636 FOR OP/ED)

## 2025-02-11 PROCEDURE — 93005 ELECTROCARDIOGRAM TRACING: CPT

## 2025-02-11 PROCEDURE — 2500000004 HC RX 250 GENERAL PHARMACY W/ HCPCS (ALT 636 FOR OP/ED)

## 2025-02-11 PROCEDURE — 2500000001 HC RX 250 WO HCPCS SELF ADMINISTERED DRUGS (ALT 637 FOR MEDICARE OP)

## 2025-02-11 PROCEDURE — 1200000002 HC GENERAL ROOM WITH TELEMETRY DAILY

## 2025-02-11 PROCEDURE — 99232 SBSQ HOSP IP/OBS MODERATE 35: CPT | Performed by: STUDENT IN AN ORGANIZED HEALTH CARE EDUCATION/TRAINING PROGRAM

## 2025-02-11 PROCEDURE — 93010 ELECTROCARDIOGRAM REPORT: CPT | Performed by: STUDENT IN AN ORGANIZED HEALTH CARE EDUCATION/TRAINING PROGRAM

## 2025-02-11 PROCEDURE — 2500000001 HC RX 250 WO HCPCS SELF ADMINISTERED DRUGS (ALT 637 FOR MEDICARE OP): Performed by: INTERNAL MEDICINE

## 2025-02-11 RX ORDER — LEVOTHYROXINE SODIUM 88 UG/1
88 TABLET ORAL
Status: DISCONTINUED | OUTPATIENT
Start: 2025-02-12 | End: 2025-02-17 | Stop reason: HOSPADM

## 2025-02-11 RX ADMIN — MORPHINE SULFATE 2 MG: 2 INJECTION, SOLUTION INTRAMUSCULAR; INTRAVENOUS at 00:36

## 2025-02-11 RX ADMIN — METOPROLOL SUCCINATE 25 MG: 25 TABLET, EXTENDED RELEASE ORAL at 09:46

## 2025-02-11 RX ADMIN — ACETAMINOPHEN 650 MG: 325 TABLET, FILM COATED ORAL at 16:22

## 2025-02-11 RX ADMIN — ACETAMINOPHEN 650 MG: 325 TABLET, FILM COATED ORAL at 09:52

## 2025-02-11 RX ADMIN — MORPHINE SULFATE 2 MG: 2 INJECTION, SOLUTION INTRAMUSCULAR; INTRAVENOUS at 04:18

## 2025-02-11 RX ADMIN — ACETAMINOPHEN 650 MG: 325 TABLET, FILM COATED ORAL at 04:18

## 2025-02-11 RX ADMIN — HEPARIN SODIUM 5000 UNITS: 5000 INJECTION INTRAVENOUS; SUBCUTANEOUS at 01:34

## 2025-02-11 RX ADMIN — ACETAMINOPHEN 650 MG: 325 TABLET, FILM COATED ORAL at 00:38

## 2025-02-11 RX ADMIN — FERROUS SULFATE TAB 325 MG (65 MG ELEMENTAL FE) 325 MG: 325 (65 FE) TAB at 09:47

## 2025-02-11 RX ADMIN — HEPARIN SODIUM 5000 UNITS: 5000 INJECTION INTRAVENOUS; SUBCUTANEOUS at 09:46

## 2025-02-11 RX ADMIN — ACETAMINOPHEN 650 MG: 325 TABLET, FILM COATED ORAL at 23:56

## 2025-02-11 RX ADMIN — TAMSULOSIN HYDROCHLORIDE 0.4 MG: 0.4 CAPSULE ORAL at 09:46

## 2025-02-11 RX ADMIN — HEPARIN SODIUM 5000 UNITS: 5000 INJECTION INTRAVENOUS; SUBCUTANEOUS at 16:22

## 2025-02-11 RX ADMIN — ATORVASTATIN CALCIUM 10 MG: 10 TABLET, FILM COATED ORAL at 20:04

## 2025-02-11 ASSESSMENT — PAIN DESCRIPTION - ORIENTATION
ORIENTATION: RIGHT
ORIENTATION: RIGHT

## 2025-02-11 ASSESSMENT — PAIN - FUNCTIONAL ASSESSMENT
PAIN_FUNCTIONAL_ASSESSMENT: 0-10

## 2025-02-11 ASSESSMENT — PAIN SCALES - GENERAL
PAINLEVEL_OUTOF10: 0 - NO PAIN
PAINLEVEL_OUTOF10: 1
PAINLEVEL_OUTOF10: 0 - NO PAIN
PAINLEVEL_OUTOF10: 10 - WORST POSSIBLE PAIN
PAINLEVEL_OUTOF10: 0 - NO PAIN
PAINLEVEL_OUTOF10: 3
PAINLEVEL_OUTOF10: 4
PAINLEVEL_OUTOF10: 8

## 2025-02-11 ASSESSMENT — PAIN SCALES - WONG BAKER: WONGBAKER_NUMERICALRESPONSE: NO HURT

## 2025-02-11 ASSESSMENT — PAIN DESCRIPTION - LOCATION
LOCATION: LEG
LOCATION: LEG

## 2025-02-11 NOTE — CONSULTS
"Nutrition Initial Assessment:   Nutrition Assessment    Reason for Assessment: Dietitian discretion (BMI <18.5; MST=0)    Patient is a 85 y.o. male presenting with rupture of R quadricep tendon, s/p repair 2/10/25    Pmhx: HTN, HLD, hypothyroidism, megaloblastic anemia, gilberts syndrome, BPH, pancreatic insuffficiency    Nutrition History:  Energy Intake: Good > 75 %  Pain affecting nutrition status: N/A  Food and Nutrient History: Pt laying in bed at visit.  Pt reports good appetite, denies chewing or swallowing problems. Pt lives alone, does his own cooking and shopping.  Usually microwaves his meals or orders take out.  Eats 3 meals per day.  Pt reports he has weighed around 120lbs for quite some time; weighed 140lbs in 2020, started taking Creon due to pancreas not making enzymes to digest food, however due to the expense and not feeling like the med was making a difference, he stopped.  He also notes 2020 was about the time his sister passed away and she did alot of the cooking. Pt was agreeable to try Mighty shakes, as he has tried Ensure in the past and does not like.  Vitamin/Herbal Supplement Use: FeSO4       Anthropometrics:  Height: 180.3 cm (5' 10.98\")   Weight: 54.4 kg (119 lb 14.9 oz)   BMI (Calculated): 16.73  IBW/kg (Dietitian Calculated): 78.2 kg  Percent of IBW: 70 %       Weight History:     Weight Change %:  Weight History / % Weight Change: 1/31 54.4kg, 11/6 50.8kg, 9/13 52kg, 8/1 52.2kg, 6/12 52.6kg, 5/1 53.9kg, 4/12 64.1kg?, 3/8 54.4kg, 2/3 54.4kg  Significant Weight Loss: No    Nutrition Focused Physical Exam Findings:    Subcutaneous Fat Loss:   Orbital Fat Pads: Severe (dark circles, hollowing and loose skin)  Buccal Fat Pads: Severe (hollow, sunken and narrow face)  Triceps: Severe (negligible fat tissue)  Ribs: Defer  Muscle Wasting:  Temporalis: Severe (hollowed scooping depression)  Pectoralis (Clavicular Region): Severe (protruding prominent clavicle)  Deltoid/Trapezius: Severe " (squared shoulders, acromion process prominent)  Interosseous: Defer  Trapezius/Infraspinatus/Supraspinatus (Scapular Region): Defer  Quadriceps: Defer  Gastrocnemius: Defer  Edema:  Edema: none  Physical Findings:  Skin: Positive (R knee surgical incision)    Nutrition Significant Labs:  CBC Trend:   Results from last 7 days   Lab Units 02/10/25  0509 02/09/25  0916   WBC AUTO x10*3/uL 7.7 8.6   RBC AUTO x10*6/uL 2.73* 3.58*   HEMOGLOBIN g/dL 8.6* 11.5*   HEMATOCRIT % 27.0* 35.7*   MCV fL 99 100   PLATELETS AUTO x10*3/uL 186 257    , BMP Trend:   Results from last 7 days   Lab Units 02/10/25  0509 02/09/25  0916   GLUCOSE mg/dL 101* 95   CALCIUM mg/dL 8.4* 10.0   SODIUM mmol/L 137 139   POTASSIUM mmol/L 4.6 4.5   CO2 mmol/L 25 26   CHLORIDE mmol/L 107 105   BUN mg/dL 38* 38*   CREATININE mg/dL 1.07 1.26        Nutrition Specific Medications:  Reviewed     I/O:   Last BM Date: 02/08/25;      Dietary Orders (From admission, onward)       Start     Ordered    02/11/25 0957  Oral nutritional supplements  Until discontinued        Comments: chocolate   Question Answer Comment   Deliver with Breakfast    Deliver with Lunch    Deliver with Dinner    Select supplement: Mighty Shake        02/11/25 0957    02/11/25 0754  Adult diet Regular  Diet effective now        Comments: No red meat or pork.   Question:  Diet type  Answer:  Regular    02/11/25 0753    02/09/25 1245  May Participate in Room Service  ( ROOM SERVICE MAY PARTICIPATE)  Once        Question:  .  Answer:  Yes    02/09/25 1244                     Estimated Needs:      Method for Estimating Needs: 1600-1900kcals ( 30-35kcals/kg ABW)     Method for Estimating 24 Hour Protein Needs: 65-76g (1.2-1.4g/kg ABW)     Method for Estimating 24 Hour Fluid Needs: 1 mL/kcal or as per MD  Patient on Order Fluid Restriction: No        Nutrition Diagnosis   Malnutrition Diagnosis  Patient has Malnutrition Diagnosis: Yes  Diagnosis Status: New  Malnutrition Diagnosis: Severe  malnutrition related to chronic disease or condition  Related to: hx pancreatic insufficiency  As Evidenced by: severe muscle wasting and severe subcutaneous fat loss  Additional Assessment Information: Weight has been relatiely stable over the past year, good appetite    Nutrition Diagnosis  Patient has Nutrition Diagnosis: Yes  Diagnosis Status (1): New  Nutrition Diagnosis 1: Increased nutrient needs  Related to (1): physiological causes increasing nutrient needs  As Evidenced by (1): post op wound healing needs       Nutrition Interventions/Recommendations   Nutrition prescription for oral nutrition    Nutrition Recommendations:  Individualized Nutrition Prescription Provided for : Regular diet (no red meat or pork); Mighty shakes TID    Nutrition Interventions/Goals:   Interventions: Vitamin and mineral supplement therapy  Meals and Snacks: General healthful diet  Goal: consume >75% of meals  Medical Food Supplement: Commercial beverage medical food supplement therapy  Goal: consume >75% of Mighty shakes TID (for an additional 220 kcals, 6 gm protein each)  Vitamin and Mineral Supplement Therapy: Iron supplement therapy      Education Documentation  No documentation found.     No questions regarding diet     Nutrition Monitoring and Evaluation   Food/Nutrient Related History Monitoring  Monitoring and Evaluation Plan: Estimated Energy Intake, Fluid intake, Intake / amount of food  Estimated Energy Intake: Energy intake greater or equal to 75% of estimated energy needs  Fluid Intake: Estimated fluid intake  Intake / Amount of food: Consumes at least 75% or more of meals/snacks/supplements    Anthropometric Measurements  Monitoring and Evaluation Plan: Body weight  Body Weight: Body weight - Promote weight restoration, Body weight - Maintain stable weight    Biochemical Data, Medical Tests and Procedures  Monitoring and Evaluation Plan: Electrolyte/renal panel  Electrolyte and Renal Panel: Electrolytes within  normal limits    Physical Exam Findings  Monitoring and Evaluation Plan: Adipose, Muscles, Skin  Adipose Finding: Loss of subcutaneous fat  Muscle Finding: Muscle atrophy  Other: promote healing through adequate nutrition    Goal Status: New goal(s) identified    Time Spent (min): 45 minutes

## 2025-02-11 NOTE — CARE PLAN
The patient's goals for the shift include      The clinical goals for the shift include Patient will remain comfortable throughout this shift.      Problem: Pain - Adult  Goal: Verbalizes/displays adequate comfort level or baseline comfort level  Outcome: Progressing     Problem: Safety - Adult  Goal: Free from fall injury  Outcome: Progressing     Problem: Fall/Injury  Goal: Not fall by end of shift  Outcome: Progressing

## 2025-02-11 NOTE — PROGRESS NOTES
Alicia Franks is a 85 y.o. male on day 1 of admission presenting with Rupture of right quadriceps tendon, initial encounter.      Subjective   Patient seen and examined by me.  Patient is resting in bed and is postop and blood pressure is stable at 143/68 and he is afebrile and is awake alert oriented x 3 and is hungry and wants to eat his dinner.  Patient underwent repair of rupture of right quadriceps tendon today.  Vitals and labs noted and his hemoglobin has dropped to 8.6 and hematocrit 27.0.       Objective     Last Recorded Vitals  /66 (BP Location: Left arm, Patient Position: Lying)   Pulse 64   Temp 36.7 °C (98.1 °F) (Temporal)   Resp 16   Wt 54.4 kg (120 lb)   SpO2 99%   Intake/Output last 3 Shifts:    Intake/Output Summary (Last 24 hours) at 2/10/2025 1944  Last data filed at 2/10/2025 1627  Gross per 24 hour   Intake 1100 ml   Output 300 ml   Net 800 ml       Admission Weight  Weight: 54.4 kg (120 lb) (02/09/25 0850)    Daily Weight  02/10/25 : 54.4 kg (120 lb)    Image Results  Transthoracic Echo (TTE) [facility], [address] [place] [address]            Tel [phone] and Fax [phone]    TRANSTHORACIC ECHOCARDIOGRAM REPORT       Patient Name:       ALICIA FRANKS        Reading Physician:   [id number] Leyva MD  Study Date:         2/10/2025           Ordering Provider:   [id number] LEYVA  MRN/PID:            [id number]            Fellow:  Accession#:         [id number]        Nurse:  Date of Birth/Age:  1940 / 85      Sonographer:         Lucas Jeffers ACS,                      years                                    RDCS, FASE  Gender assigned at  M                   Additional Staff:  Birth:  Height:             177.80 cm           Admit Date:          2/9/2025  Weight:             54.43 kg            Admission  Status:    Observation -                                                               Priority discharge  BSA / BMI:          1.68 m2 / 17.22     Encounter#:          [id number]                      kg/m2  Blood Pressure:     100/52 mmHg         Department Location: [facility]    Study Type:    TRANSTHORACIC ECHO (TTE) COMPLETE  Diagnosis/ICD: Nonrheumatic aortic (valve) stenosis-I35.0  Indication:    Aortic valve stenosis.  CPT Code:      Echo Complete w Full Doppler-04047    Patient History:  Pertinent History: HTN. AS, arrhythmia, pre-op.    Study Detail: The following Echo studies were performed: 2D, M-Mode, Doppler and                color flow. Technically challenging study due to body habitus and                prominent lung artifact.       Critical Event  Critical Event: Test was completed as per department protocol.  Critical Finding: Critical AS.  Time Test was Completed: 10:14:57 AM  Notified: Dr. Weber.  Attending notification time: 10:15:06 AM     PHYSICIAN INTERPRETATION:  Left Ventricle: The left ventricular systolic function is normal, with a visually estimated ejection fraction of 65-70%. There are no regional left ventricular wall motion abnormalities. The left ventricular cavity size is normal. There is normal septal and normal posterior left ventricular wall thickness. Left ventricular diastolic filling is indeterminate.  Left Atrium: The left atrium is mildly dilated.  Right Ventricle: The right ventricle is normal in size. There is normal right ventricular global systolic function.  Right Atrium: The right atrium is mildly dilated.  Aortic Valve: The aortic valve is trileaflet. There is moderate to severe aortic valve cusp calcification. There is moderate aortic valve thickening. There is reduced systolic aortic valve leaflet excursion. There is aortic valve annular calcification. There is evidence of severe aortic valve stenosis. The aortic valve dimensionless index is 0.19. There  is moderate aortic valve regurgitation. The peak instantaneous gradient of the aortic valve is 68 mmHg. The mean gradient of the aortic valve is 41 mmHg.  Mitral Valve: The mitral valve is moderately thickened. There is severe mitral annular calcification. The peak instantaneous gradient of the mitral valve is 9 mmHg. There is mild mitral valve regurgitation.  Tricuspid Valve: The tricuspid valve is structurally normal. There is trace tricuspid regurgitation. The Doppler estimated RVSP is within normal limits at 25.5 mmHg.  Pulmonic Valve: The pulmonic valve is structurally normal. There is physiologic pulmonic valve regurgitation.  Pericardium: Trivial pericardial effusion.  Aorta: The aortic root is normal. The aortic root is at the upper limits of normal size.  In comparison to the previous echocardiogram(s): Compared with study dated 7/1/2024, Progression of aortic valve stenosis to severe.       CONCLUSIONS:   1. The left ventricular systolic function is normal, with a visually estimated ejection fraction of 65-70%.   2. Left ventricular diastolic filling is indeterminate.   3. There is normal right ventricular global systolic function.   4. The left atrium is mildly dilated.   5. There is severe mitral annular calcification.   6. Mild mitral valve regurgitation.   7. Severe aortic valve stenosis. Vmax 4.13 m/s, mean gradient 41 mmhg, DI 0.19.   8. Moderate aortic valve regurgitation.   9. Right ventricular systolic pressure is within normal limits.    QUANTITATIVE DATA SUMMARY:     2D MEASUREMENTS:          Normal Ranges:  Ao Root d:       3.40 cm  (2.0-3.7cm)  LAs:             3.50 cm  (2.7-4.0cm)  IVSd:            0.90 cm  (0.6-1.1cm)  LVPWd:           0.90 cm  (0.6-1.1cm)  LVIDd:           4.30 cm  (3.9-5.9cm)  LVIDs:           2.30 cm  LV Mass Index:   73 g/m2  LVEDV Index:     53 ml/m2  LV % FS          46.5 %       LEFT ATRIUM:                 Normal Ranges:  LA Vol A4C:        38.2 ml    (22+/-6mL/m2)  LA Vol Index A4C:  22.7ml/m2  LA Area A4C:       16.0 cm2  LA Major Axis A4C: 5.7 cm       RIGHT ATRIUM:          Normal Ranges:  RA Area A4C:  14.0 cm2       M-MODE MEASUREMENTS:         Normal Ranges:  Ao Root:             3.60 cm (2.0-3.7cm)  LAs:                 4.00 cm (2.7-4.0cm)       AORTA MEASUREMENTS:         Normal Ranges:  Asc Ao, d:          3.20 cm (2.1-3.4cm)       LV SYSTOLIC FUNCTION:                       Normal Ranges:  EF-A4C View:    68 % (>=55%)  EF-A2C View:    76 %  EF-Biplane:     73 %  EF-Visual:      68 %  LV EF Reported: 68 %       LV DIASTOLIC FUNCTION:           Normal Ranges:  MV Peak E:             1.45 m/s  (0.7-1.2 m/s)  MV Peak A:             1.27 m/s  (0.42-0.7 m/s)  E/A Ratio:             1.14      (1.0-2.2)  MV e'                  0.075 m/s (>8.0)  MV lateral e'          0.08 m/s  MV medial e'           0.07 m/s  E/e' Ratio:            19.32     (<8.0)       MITRAL VALVE:          Normal Ranges:  MV Vmax:      1.53 m/s (<=1.3m/s)  MV peak P.4 mmHg (<5mmHg)  MV mean PG:   3.0 mmHg (<2mmHg)  MV DT:        220 msec (150-240msec)       AORTIC VALVE:                      Normal Ranges:  AoV Vmax:                4.13 m/s  (<=1.7m/s)  AoV Peak P.2 mmHg (<20mmHg)  AoV Mean P.0 mmHg (1.7-11.5mmHg)  LVOT Max Gallo:            0.78 m/s  (<=1.1m/s)  AoV VTI:                 107.00 cm (18-25cm)  LVOT VTI:                20.10 cm  LVOT Diameter:           2.10 cm   (1.8-2.4cm)  AoV Area, VTI:           0.65 cm2  (2.5-5.5cm2)  AoV Area,Vmax:           0.65 cm2  (2.5-4.5cm2)  AoV Dimensionless Index: 0.19       RIGHT VENTRICLE:  RV Basal 3.30 cm  TAPSE:   26.8 mm  RV s'    0.15 m/s       TRICUSPID VALVE/RVSP:          Normal Ranges:  Peak TR Velocity:     2.37 m/s  RV Syst Pressure:     25 mmHg  (< 30mmHg)  IVC Diam:             1.70 cm       PULMONIC VALVE:          Normal Ranges:  PV Max Gallo:     1.1 m/s  (0.6-0.9m/s)  PV Max P.6  mmHg       [id number] Jarrell Weber MD  Electronically signed on 2/10/2025 at 2:28:30 PM       ** Final **  ECG 12 lead  Sinus rhythm with 1st degree AV block with Premature supraventricular complexes  Pulmonary disease pattern  Left anterior fascicular block  Abnormal ECG  When compared with ECG of 01-AUG-2024 11:06,  Fusion complexes are no longer Present  Premature ventricular complexes are no longer Present  Premature supraventricular complexes are now Present  Right bundle branch block is no longer Present  Confirmed by Jarrell Weber ([id number]) on 2/10/2025 9:34:37 AM      Physical Exam  Vitals and nursing note reviewed.   Constitutional:       General: He is not in acute distress.     Appearance: Normal appearance. He is normal weight. He is not ill-appearing or toxic-appearing.   HENT:      Head: Normocephalic and atraumatic.      Right Ear: Tympanic membrane and ear canal normal. There is no impacted cerumen.      Left Ear: Tympanic membrane, ear canal and external ear normal.      Nose: Nose normal. No congestion or rhinorrhea.      Mouth/Throat:      Pharynx: Oropharynx is clear. No oropharyngeal exudate or posterior oropharyngeal erythema.   Eyes:      General: No scleral icterus.        Right eye: No discharge.         Left eye: No discharge.      Extraocular Movements: Extraocular movements intact.      Conjunctiva/sclera: Conjunctivae normal.      Pupils: Pupils are equal, round, and reactive to light.   Neck:      Vascular: No carotid bruit.   Cardiovascular:      Rate and Rhythm: Normal rate and regular rhythm.      Pulses: Normal pulses.      Heart sounds: Murmur heard.      No gallop.   Pulmonary:      Effort: Pulmonary effort is normal. No tachypnea, bradypnea, accessory muscle usage or respiratory distress.      Breath sounds: Normal breath sounds and air entry. No wheezing, rhonchi or rales.   Abdominal:      General: Abdomen is flat. Bowel sounds are normal. There is no distension.       Palpations: Abdomen is soft. There is no mass.      Tenderness: There is no abdominal tenderness. There is no right CVA tenderness, left CVA tenderness, guarding or rebound.      Hernia: No hernia is present.   Musculoskeletal:         General: No swelling or tenderness. Normal range of motion.      Cervical back: Normal range of motion and neck supple. No rigidity.      Right lower leg: No edema.      Left lower leg: No edema.   Lymphadenopathy:      Cervical: No cervical adenopathy.   Skin:     General: Skin is warm.      Coloration: Skin is not jaundiced.      Findings: No erythema or rash.   Neurological:      General: No focal deficit present.      Mental Status: He is alert and oriented to person, place, and time. Mental status is at baseline.      Cranial Nerves: Cranial nerves 2-12 are intact. No cranial nerve deficit, dysarthria or facial asymmetry.      Sensory: No sensory deficit.      Motor: No weakness, tremor or seizure activity.      Gait: Gait abnormal.      Deep Tendon Reflexes: Reflexes are normal and symmetric. Reflexes normal.   Psychiatric:         Mood and Affect: Mood normal.         Thought Content: Thought content normal.         Judgment: Judgment normal.         Relevant Results    Current Facility-Administered Medications:     acetaminophen (Tylenol) tablet 650 mg, 650 mg, oral, q4h PRN **OR** acetaminophen (Tylenol) oral liquid 650 mg, 650 mg, oral, q4h PRN **OR** acetaminophen (Tylenol) suppository 650 mg, 650 mg, rectal, q4h PRN, Jhoana Nuñez PA-C    atorvastatin (Lipitor) tablet 10 mg, 10 mg, oral, Nightly, Jhoana Nuñez PA-C, 10 mg at 02/09/25 2020    ferrous sulfate (325 mg ferrous sulfate) tablet 325 mg, 325 mg, oral, Daily with lunch, TIFFANY Alfonso-PARTH, 325 mg at 02/10/25 1159    heparin (porcine) injection 5,000 Units, 5,000 Units, subcutaneous, q8h, Jhoana Nuñez PA-C, 5,000 Units at 02/10/25 1709    levothyroxine (Synthroid, Levoxyl) tablet 88 mcg, 88 mcg, oral, Daily, Jhoana  LIMA Joaquin, PA-C, 88 mcg at 02/10/25 1002    [Transfer Hold] losartan (Cozaar) tablet 50 mg, 50 mg, oral, Daily, Brien Iyer MD    metoprolol succinate XL (Toprol-XL) 24 hr tablet 25 mg, 25 mg, oral, Daily, Brien Iyer MD    morphine injection 2 mg, 2 mg, intravenous, q4h PRN, Jhoana F Joaquin, PA-C    ondansetron (Zofran) tablet 4 mg, 4 mg, oral, q8h PRN **OR** ondansetron (Zofran) injection 4 mg, 4 mg, intravenous, q8h PRN, Jhoana F Joaquin, PA-C, 4 mg at 02/10/25 1415    polyethylene glycol (Glycolax, Miralax) packet 17 g, 17 g, oral, Daily, Jhoana F Joaquin, PA-C    tamsulosin (Flomax) 24 hr capsule 0.4 mg, 0.4 mg, oral, Daily, Jhoana F Joaquin, PA-C, 0.4 mg at 02/10/25 1002   Results for orders placed or performed during the hospital encounter of 02/09/25 (from the past 24 hours)   Urinalysis with Reflex Culture and Microscopic   Result Value Ref Range    Color, Urine Yellow Light-Yellow, Yellow, Dark-Yellow    Appearance, Urine Clear Clear    Specific Gravity, Urine 1.019 1.005 - 1.035    pH, Urine 5.0 5.0, 5.5, 6.0, 6.5, 7.0, 7.5, 8.0    Protein, Urine NEGATIVE NEGATIVE, 10 (TRACE), 20 (TRACE) mg/dL    Glucose, Urine Normal Normal mg/dL    Blood, Urine NEGATIVE NEGATIVE mg/dL    Ketones, Urine NEGATIVE NEGATIVE mg/dL    Bilirubin, Urine NEGATIVE NEGATIVE mg/dL    Urobilinogen, Urine Normal Normal mg/dL    Nitrite, Urine NEGATIVE NEGATIVE    Leukocyte Esterase, Urine NEGATIVE NEGATIVE   CBC   Result Value Ref Range    WBC 7.7 4.4 - 11.3 x10*3/uL    nRBC 0.0 0.0 - 0.0 /100 WBCs    RBC 2.73 (L) 4.50 - 5.90 x10*6/uL    Hemoglobin 8.6 (L) 13.5 - 17.5 g/dL    Hematocrit 27.0 (L) 41.0 - 52.0 %    MCV 99 80 - 100 fL    MCH 31.5 26.0 - 34.0 pg    MCHC 31.9 (L) 32.0 - 36.0 g/dL    RDW 12.9 11.5 - 14.5 %    Platelets 186 150 - 450 x10*3/uL   Basic metabolic panel   Result Value Ref Range    Glucose 101 (H) 74 - 99 mg/dL    Sodium 137 136 - 145 mmol/L    Potassium 4.6 3.5 - 5.3 mmol/L    Chloride 107 98 - 107 mmol/L    Bicarbonate 25 21  - 32 mmol/L    Anion Gap 10 10 - 20 mmol/L    Urea Nitrogen 38 (H) 6 - 23 mg/dL    Creatinine 1.07 0.50 - 1.30 mg/dL    eGFR 68 >60 mL/min/1.73m*2    Calcium 8.4 (L) 8.6 - 10.3 mg/dL   Transthoracic Echo (TTE) Complete   Result Value Ref Range    AV pk buddy 4.13 m/s    AV mn grad 41 mmHg    LVOT diam 2.10 cm    MV E/A ratio 1.14     Tricuspid annular plane systolic excursion 2.7 cm    LV EF 68 %    RV free wall pk S' 14.80 cm/s    LVIDd 4.30 cm    RVSP 25.5 mmHg    Aortic Valve Area by Continuity of VTI 0.65 cm2    Aortic Valve Area by Continuity of Peak Velocity 0.65 cm2    AV pk grad 68 mmHg    LV A4C EF 67.9     Transthoracic Echo (TTE) Complete    Result Date: 2/10/2025   [facility], [address] [place] [address]           Tel [phone] and Fax [phone] TRANSTHORACIC ECHOCARDIOGRAM REPORT  Patient Name:       ALICIA Piper Physician:   [id number] Tenisha Weber MD Study Date:         2/10/2025           Ordering Provider:   [id number] TENISHA WEBER MRN/PID:            [id number]            Fellow: Accession#:         [id number]        Nurse: Date of Birth/Age:  1940 / 85      Sonographer:         Lucas Jeffers ACS,                     years                                    RDCS, FASE Gender assigned at  M                   Additional Staff: Birth: Height:             177.80 cm           Admit Date:          2/9/2025 Weight:             54.43 kg            Admission Status:    Observation -                                                              Priority discharge BSA / BMI:          1.68 m2 / 17.22     Encounter#:          [id number]                     kg/m2 Blood Pressure:     100/52 mmHg         Department Location: [facility] Study Type:    TRANSTHORACIC ECHO (TTE) COMPLETE Diagnosis/ICD: Nonrheumatic aortic (valve) stenosis-I35.0  Indication:    Aortic valve stenosis. CPT Code:      Echo Complete w Full Doppler-[id number] Patient History: Pertinent History: HTN. AS, arrhythmia, pre-op. Study Detail: The following Echo studies were performed: 2D, M-Mode, Doppler and               color flow. Technically challenging study due to body habitus and               prominent lung artifact.  Critical Event Critical Event: Test was completed as per department protocol. Critical Finding: Critical AS. Time Test was Completed: 10:14:57 AM Notified: Dr. Weber. Attending notification time: 10:15:06 AM  PHYSICIAN INTERPRETATION: Left Ventricle: The left ventricular systolic function is normal, with a visually estimated ejection fraction of 65-70%. There are no regional left ventricular wall motion abnormalities. The left ventricular cavity size is normal. There is normal septal and normal posterior left ventricular wall thickness. Left ventricular diastolic filling is indeterminate. Left Atrium: The left atrium is mildly dilated. Right Ventricle: The right ventricle is normal in size. There is normal right ventricular global systolic function. Right Atrium: The right atrium is mildly dilated. Aortic Valve: The aortic valve is trileaflet. There is moderate to severe aortic valve cusp calcification. There is moderate aortic valve thickening. There is reduced systolic aortic valve leaflet excursion. There is aortic valve annular calcification. There is evidence of severe aortic valve stenosis. The aortic valve dimensionless index is 0.19. There is moderate aortic valve regurgitation. The peak instantaneous gradient of the aortic valve is 68 mmHg. The mean gradient of the aortic valve is 41 mmHg. Mitral Valve: The mitral valve is moderately thickened. There is severe mitral annular calcification. The peak instantaneous gradient of the mitral valve is 9 mmHg. There is mild mitral valve regurgitation. Tricuspid Valve: The tricuspid valve is structurally normal.  There is trace tricuspid regurgitation. The Doppler estimated RVSP is within normal limits at 25.5 mmHg. Pulmonic Valve: The pulmonic valve is structurally normal. There is physiologic pulmonic valve regurgitation. Pericardium: Trivial pericardial effusion. Aorta: The aortic root is normal. The aortic root is at the upper limits of normal size. In comparison to the previous echocardiogram(s): Compared with study dated 7/1/2024, Progression of aortic valve stenosis to severe.  CONCLUSIONS:  1. The left ventricular systolic function is normal, with a visually estimated ejection fraction of 65-70%.  2. Left ventricular diastolic filling is indeterminate.  3. There is normal right ventricular global systolic function.  4. The left atrium is mildly dilated.  5. There is severe mitral annular calcification.  6. Mild mitral valve regurgitation.  7. Severe aortic valve stenosis. Vmax 4.13 m/s, mean gradient 41 mmhg, DI 0.19.  8. Moderate aortic valve regurgitation.  9. Right ventricular systolic pressure is within normal limits. QUANTITATIVE DATA SUMMARY:  2D MEASUREMENTS:          Normal Ranges: Ao Root d:       3.40 cm  (2.0-3.7cm) LAs:             3.50 cm  (2.7-4.0cm) IVSd:            0.90 cm  (0.6-1.1cm) LVPWd:           0.90 cm  (0.6-1.1cm) LVIDd:           4.30 cm  (3.9-5.9cm) LVIDs:           2.30 cm LV Mass Index:   73 g/m2 LVEDV Index:     53 ml/m2 LV % FS          46.5 %  LEFT ATRIUM:                 Normal Ranges: LA Vol A4C:        38.2 ml   (22+/-6mL/m2) LA Vol Index A4C:  22.7ml/m2 LA Area A4C:       16.0 cm2 LA Major Axis A4C: 5.7 cm  RIGHT ATRIUM:          Normal Ranges: RA Area A4C:  14.0 cm2  M-MODE MEASUREMENTS:         Normal Ranges: Ao Root:             3.60 cm (2.0-3.7cm) LAs:                 4.00 cm (2.7-4.0cm)  AORTA MEASUREMENTS:         Normal Ranges: Asc Ao, d:          3.20 cm (2.1-3.4cm)  LV SYSTOLIC FUNCTION:                      Normal Ranges: EF-A4C View:    68 % (>=55%) EF-A2C View:    76 %  EF-Biplane:     73 % EF-Visual:      68 % LV EF Reported: 68 %  LV DIASTOLIC FUNCTION:           Normal Ranges: MV Peak E:             1.45 m/s  (0.7-1.2 m/s) MV Peak A:             1.27 m/s  (0.42-0.7 m/s) E/A Ratio:             1.14      (1.0-2.2) MV e'                  0.075 m/s (>8.0) MV lateral e'          0.08 m/s MV medial e'           0.07 m/s E/e' Ratio:            19.32     (<8.0)  MITRAL VALVE:          Normal Ranges: MV Vmax:      1.53 m/s (<=1.3m/s) MV peak P.4 mmHg (<5mmHg) MV mean PG:   3.0 mmHg (<2mmHg) MV DT:        220 msec (150-240msec)  AORTIC VALVE:                      Normal Ranges: AoV Vmax:                4.13 m/s  (<=1.7m/s) AoV Peak P.2 mmHg (<20mmHg) AoV Mean P.0 mmHg (1.7-11.5mmHg) LVOT Max Gallo:            0.78 m/s  (<=1.1m/s) AoV VTI:                 107.00 cm (18-25cm) LVOT VTI:                20.10 cm LVOT Diameter:           2.10 cm   (1.8-2.4cm) AoV Area, VTI:           0.65 cm2  (2.5-5.5cm2) AoV Area,Vmax:           0.65 cm2  (2.5-4.5cm2) AoV Dimensionless Index: 0.19  RIGHT VENTRICLE: RV Basal 3.30 cm TAPSE:   26.8 mm RV s'    0.15 m/s  TRICUSPID VALVE/RVSP:          Normal Ranges: Peak TR Velocity:     2.37 m/s RV Syst Pressure:     25 mmHg  (< 30mmHg) IVC Diam:             1.70 cm  PULMONIC VALVE:          Normal Ranges: PV Max Gallo:     1.1 m/s  (0.6-0.9m/s) PV Max P.6 mmHg  [id number] Jarrell Weber MD Electronically signed on 2/10/2025 at 2:28:30 PM  ** Final **     ECG 12 lead    Result Date: 2/10/2025  Sinus rhythm with 1st degree AV block with Premature supraventricular complexes Pulmonary disease pattern Left anterior fascicular block Abnormal ECG When compared with ECG of 01-AUG-2024 11:06, Fusion complexes are no longer Present Premature ventricular complexes are no longer Present Premature supraventricular complexes are now Present Right bundle branch block is no longer Present Confirmed by Jarrell Weber ([id number]) on  2/10/2025 9:34:37 AM    XR chest 1 view    Result Date: 2/9/2025  Interpreted By:  Laura Lester, STUDY: XR CHEST 1 VIEW 2/9/2025 9:39 am   INDICATION: Signs/Symptoms:pre op   COMPARISON: None available.   ACCESSION NUMBER(S): [id number]   ORDERING CLINICIAN: CURT PEÑA   TECHNIQUE: Single portable AP view chest   FINDINGS: Examination rotated accentuating the cardiac silhouette. There is mild cardiomegaly. Mild vascular calcification of the aortic knob. Surgical staples demonstrated at the level of the gastroesophageal junction. Lung fields are hyperinflated with emphysematous changes demonstrated.                 1. Emphysematous changes with chronic appearing interstitial changes.   Signed by: Laura Lester 2/9/2025 10:07 AM Dictation workstation:   [id number]    MR knee right wo IV contrast    Result Date: 1/31/2025  Interpreted By:  Jon Cronin, STUDY: MRI of the  right knee without IV contrast;  1/31/2025 3:18 pm   INDICATION: Signs/Symptoms:PATELLA QUAD RUPTURE.   ,S76.111A Strain of right quadriceps muscle, fascia and tendon, initial encounter   COMPARISON: None.   ACCESSION NUMBER(S): [id number]   ORDERING CLINICIAN: RIKY COTTRELL   TECHNIQUE: MR imaging of the right knee was obtained  without IV contrast.   FINDINGS: LIGAMENTS AND TENDONS: There is near full-thickness tear of the distal quadriceps tendon which includes the entire vastus medialis and rectus femoris tendons and the majority of the vastus lateralis and vastus intermedius tendons. A few of the vastus lateralis fibers remain intact. There is laxity of the remainder of the vastus intermedius tendons. There is significant amount of fluid at the quadriceps tendon site with retraction of the torn tendons and a gap of up to 3 cm.   The patellar tendon is intact. There is inferior subluxation of patella.   The anterior cruciate ligament and the posterior cruciate ligaments are intact. The medial collateral ligament is intact. The  lateral collateral ligament, the biceps femoris tendon, the popliteus tendon and the iliotibial band are intact.   MENISCI: The medial meniscus is intact and without evidence of tear. The lateral meniscus is intact and without evidence of tear.   JOINTS: The articular cartilage of the medial femoral condyle and medial tibial plateau is intact and without evidence of full thickness defect. The articular cartilage of the lateral femoral condyle and lateral tibial plateau is intact and without evidence of full thickness defect. The patellofemoral articulation is intact and without evidence of subluxation or articular cartilage defect. There is a moderate-sized suprapatellar joint effusion..   OSSEOUS STRUCTURES: No focal marrow replacing lesions are identified. There is no fracture.   SOFT TISSUES: There is moderate muscle edema in the distal quadriceps musculature.   There is severe subcutaneous soft tissue edema about the knee.       High-grade, near full-thickness tear of the distal quadriceps tendon with a few fibers of the vastus lateralis remaining intact. Retraction of the torn fibers with a fluid-filled gap of 3 cm. Moderate muscle edema in the distal quadriceps musculature. Moderate size suprapatellar joint effusion.   I personally reviewed the images/study and I agree with the findings as stated. This study was interpreted at [place], [place].   MACRO: None   Signed by: Jon Cronin 1/31/2025 4:30 PM Dictation workstation:   [id number]    XR knee right 4+ views    Result Date: 1/23/2025  Interpreted By:  Jani Waite, STUDY: XR KNEE RIGHT 4+ VIEWS   INDICATION: Signs/Symptoms:right knee pain after fall.   COMPARISON: None   ACCESSION NUMBER(S): [id number]   ORDERING CLINICIAN: JUDITH FENG   FINDINGS: Prepatellar soft tissue swelling as well as soft tissue swelling in the region of the distal quadriceps.   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         Prepatellar soft tissue swelling as well as soft tissue swelling in the region of the distal quadriceps.   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   Signed by: Jani Waite 1/23/2025 3:42 PM Dictation workstation:   [id number]           Assessment/Plan   This patient currently has cardiac telemetry ordered; if you would like to modify or discontinue the telemetry order, click here to go to the orders activity to modify/discontinue the order.              Assessment & Plan  Rupture of right quadriceps tendon, initial encounter  1/Hx  of mechanical fall resulting in rupture of right quadriceps tendon and failed nonoperative treatments as in the outpatient and underwent repair today by Ortho team.  He remains on IV morphine postop as needed.  Postop vitals are stable.  Further treatment and PT OT as per Ortho team.  2./Moderate to severe aortic stenosis/asymptomatic and 2D echocardiogram results noted.  Patient has been seen by cardiology.  He will continue to follow-up with his outpatient cardiologist Dr. Ascencion Miranda as recommended and has an upcoming appointment.  Patient plans on rescheduling it given his recent surgery.  2D echocardiogram done today noted and has normal LV function with EF of 65 to 70% and severe mitral annular calcification and mild mitral valve regurgitation and severe aortic valve stenosis and moderate aortic valve regurgitation.  3./Acute on chronic anemia/history of iron deficiency anemia.  Hemoglobin has dropped to 8.6 and hematocrit 27.0.  Patient remains on p.o. Feosol which is his home medication.  Check repeat CBC with differential in AM.  4./History of hypertension and his blood pressure was soft on admission and will be monitored closely and his losartan is on hold.  5./Dyslipidemia/patient to be resumed on his home dosage of atorvastatin.  6./History of chronic renal insufficiency and BPH and BUN/creatinine is stable at 38 and 1.07.   Encourage p.o. fluids.  7./Hypothyroidism and patient has been resumed on his home dosage of levothyroxine.  8/DVT prophylaxis patient remains on subcu heparin for DVT prophylaxis.  Medications reconciled.  Total time spent 30 minutes/time spent on patient interaction/counseling/assessment and plan and documentation and reviewing consultation notes.              Brien Iyer MD

## 2025-02-11 NOTE — PROGRESS NOTES
Subjective Data:  Status post knee surgery.  Patient is doing well.  Vital signs remained stable.  He is asymptomatic today.     Objective Data:  Last Recorded Vitals:  Vitals:    02/10/25 2013 02/10/25 2319 02/11/25 0350 02/11/25 0744   BP: 121/56 125/56 107/57 121/57   BP Location: Left arm Left arm Left arm Left arm   Patient Position: Lying Lying Lying Lying   Pulse: 73 70 66 72   Resp: 16 16 16 16   Temp: 36.2 °C (97.2 °F) 36.6 °C (97.9 °F) 36.9 °C (98.4 °F) 36.9 °C (98.4 °F)   TempSrc: Temporal Temporal Temporal Temporal   SpO2: 100% 96% 96% 98%   Weight:       Height:           Last Labs:  CBC - 2/10/2025:  5:09 AM  7.7 8.6 186    27.0      CMP - 2/10/2025:  5:09 AM  8.4 7.5 29 --- 1.1   _ 4.1 25 81      PTT - No results in last year.  1.0   11.5 _     LDLCALC   Date/Time Value Ref Range Status   03/08/2024 12:29 PM 77 <=99 mg/dL Final     Comment:                                 Near   Borderline      AGE      Desirable  Optimal    High     High     Very High     0-19 Y     0 - 109     ---    110-129   >/= 130     ----    20-24 Y     0 - 119     ---    120-159   >/= 160     ----      >24 Y     0 -  99   100-129  130-159   160-189     >/=190       VLDL   Date/Time Value Ref Range Status   03/08/2024 12:29 PM 14 0 - 40 mg/dL Final   07/01/2023 11:30 AM 12 0 - 40 mg/dL Final   11/04/2022 01:07 PM 14 0 - 40 mg/dL Final   07/29/2022 12:09 PM 17 0 - 40 mg/dL Final      Last I/O:  I/O last 3 completed shifts:  In: 1100 (20.2 mL/kg) [IV Piggyback:1100]  Out: 500 (9.2 mL/kg) [Urine:500 (0.3 mL/kg/hr)]  Weight: 54.4 kg     Past Cardiology Tests (Last 3 Years):  EKG:  ECG 12 lead 02/09/2025      ECG 12 lead (Clinic Performed) 08/01/2024    Echo:  Transthoracic Echo (TTE) Complete 02/10/2025      Transthoracic Echo Complete 07/12/2024    Ejection Fractions:  EF   Date/Time Value Ref Range Status   02/10/2025 10:17 AM 68 %    07/12/2024 02:22 PM 58 %      Cath:  No results found for this or any previous visit from the  past 1095 days.    Stress Test:  No results found for this or any previous visit from the past 1095 days.    Cardiac Imaging:  No results found for this or any previous visit from the past 1095 days.      Inpatient Medications:  Scheduled medications   Medication Dose Route Frequency    atorvastatin  10 mg oral Nightly    ferrous sulfate (325 mg ferrous sulfate)  325 mg oral Daily with lunch    heparin (porcine)  5,000 Units subcutaneous q8h    [START ON 2/12/2025] levothyroxine  88 mcg oral Daily    [Transfer Hold] losartan  50 mg oral Daily    metoprolol succinate XL  25 mg oral Daily    polyethylene glycol  17 g oral Daily    tamsulosin  0.4 mg oral Daily     PRN medications   Medication    acetaminophen    Or    acetaminophen    Or    acetaminophen    morphine    ondansetron    Or    ondansetron     Continuous Medications   Medication Dose Last Rate       Physical Exam:  General: No acute distress,  A&O x3  Skin: Warm and dry  Neck: JVD is not elevated  ENT: Moist mucous membranes no lesions appreciated  Pulmonary: CTAB  Cards: Regular rate rhythm, 3 out of 6 crescendo decrescendo mid peaking murmur noted across aortic band, no gallops or rubs appreciated normal S1-S2  Abdomen: Soft nontender nondistended  Extremities: No edema or cyanosis  Psych: Appropriate mood and affect      Assessment/Plan       1.  Aortic stenosis: Severe by recent echocardiogram.  V-max above 4, mean gradient above 40, dimensionless index of 0.19.  He is asymptomatic.  Will require close surveillance.  Repeat echocardiogram in 6 months.  He follows with Dr. Ascencion Miranda as an outpatient.    2.  Hypertension: Blood pressures are well-controlled today with current therapy.    Thank you for the consult.  Will sign off.  Feel free to contact us if additional concerns or questions.     (This note was generated with voice recognition software and may contain errors including spelling, grammar, syntax and missed recognition of what was  dictated, of which may not have been fully corrected)     Code Status:  Full Code    Jarrell Weber MD PhD

## 2025-02-11 NOTE — ASSESSMENT & PLAN NOTE
1/Hx  of mechanical fall resulting in rupture of right quadriceps tendon and failed nonoperative treatments as in the outpatient and underwent repair today by Ortho team.  He remains on IV morphine postop as needed.  Postop vitals are stable.  Further treatment and PT OT as per Ortho team.  2./Moderate to severe aortic stenosis/asymptomatic and 2D echocardiogram results noted.  Patient has been seen by cardiology.  He will continue to follow-up with his outpatient cardiologist Dr. Ascencion Miranda as recommended and has an upcoming appointment.  Patient plans on rescheduling it given his recent surgery.  2D echocardiogram done today noted and has normal LV function with EF of 65 to 70% and severe mitral annular calcification and mild mitral valve regurgitation and severe aortic valve stenosis and moderate aortic valve regurgitation.  3./Acute on chronic anemia/history of iron deficiency anemia.  Hemoglobin has dropped to 8.6 and hematocrit 27.0.  Patient remains on p.o. Feosol which is his home medication.  Check repeat CBC with differential in AM.  4./History of hypertension and his blood pressure was soft on admission and will be monitored closely and his losartan is on hold.  5./Dyslipidemia/patient to be resumed on his home dosage of atorvastatin.  6./History of chronic renal insufficiency and BPH and BUN/creatinine is stable at 38 and 1.07.  Encourage p.o. fluids.  7./Hypothyroidism and patient has been resumed on his home dosage of levothyroxine.  8/DVT prophylaxis patient remains on subcu heparin for DVT prophylaxis.

## 2025-02-12 LAB
ALBUMIN SERPL BCP-MCNC: 3 G/DL (ref 3.4–5)
ALP SERPL-CCNC: 55 U/L (ref 33–136)
ALT SERPL W P-5'-P-CCNC: 11 U/L (ref 10–52)
ANION GAP SERPL CALC-SCNC: 10 MMOL/L (ref 10–20)
AST SERPL W P-5'-P-CCNC: 16 U/L (ref 9–39)
ATRIAL RATE: 74 BPM
ATRIAL RATE: 84 BPM
BASOPHILS # BLD AUTO: 0.04 X10*3/UL (ref 0–0.1)
BASOPHILS NFR BLD AUTO: 0.4 %
BILIRUB SERPL-MCNC: 0.8 MG/DL (ref 0–1.2)
BUN SERPL-MCNC: 28 MG/DL (ref 6–23)
CALCIUM SERPL-MCNC: 8.1 MG/DL (ref 8.6–10.3)
CHLORIDE SERPL-SCNC: 105 MMOL/L (ref 98–107)
CO2 SERPL-SCNC: 27 MMOL/L (ref 21–32)
CREAT SERPL-MCNC: 1.02 MG/DL (ref 0.5–1.3)
EGFRCR SERPLBLD CKD-EPI 2021: 72 ML/MIN/1.73M*2
EOSINOPHIL # BLD AUTO: 0 X10*3/UL (ref 0–0.4)
EOSINOPHIL NFR BLD AUTO: 0 %
ERYTHROCYTE [DISTWIDTH] IN BLOOD BY AUTOMATED COUNT: 12.5 % (ref 11.5–14.5)
GLUCOSE SERPL-MCNC: 108 MG/DL (ref 74–99)
HCT VFR BLD AUTO: 28 % (ref 41–52)
HGB BLD-MCNC: 9.3 G/DL (ref 13.5–17.5)
IMM GRANULOCYTES # BLD AUTO: 0.07 X10*3/UL (ref 0–0.5)
IMM GRANULOCYTES NFR BLD AUTO: 0.7 % (ref 0–0.9)
LYMPHOCYTES # BLD AUTO: 0.8 X10*3/UL (ref 0.8–3)
LYMPHOCYTES NFR BLD AUTO: 7.9 %
MCH RBC QN AUTO: 32.2 PG (ref 26–34)
MCHC RBC AUTO-ENTMCNC: 33.2 G/DL (ref 32–36)
MCV RBC AUTO: 97 FL (ref 80–100)
MONOCYTES # BLD AUTO: 1.19 X10*3/UL (ref 0.05–0.8)
MONOCYTES NFR BLD AUTO: 11.8 %
NEUTROPHILS # BLD AUTO: 8.02 X10*3/UL (ref 1.6–5.5)
NEUTROPHILS NFR BLD AUTO: 79.2 %
NRBC BLD-RTO: 0 /100 WBCS (ref 0–0)
P AXIS: 20 DEGREES
P AXIS: 85 DEGREES
P OFFSET: 165 MS
P ONSET: 106 MS
PLATELET # BLD AUTO: 177 X10*3/UL (ref 150–450)
POTASSIUM SERPL-SCNC: 4 MMOL/L (ref 3.5–5.3)
PR INTERVAL: 212 MS
PR INTERVAL: 249 MS
PROT SERPL-MCNC: 5.4 G/DL (ref 6.4–8.2)
Q ONSET: 212 MS
Q ONSET: 249 MS
QRS COUNT: 14 BEATS
QRS COUNT: 14 BEATS
QRS DURATION: 124 MS
QRS DURATION: 130 MS
QT INTERVAL: 392 MS
QT INTERVAL: 401 MS
QTC CALCULATION(BAZETT): 451 MS
QTC CALCULATION(BAZETT): 463 MS
QTC FREDERICIA: 433 MS
QTC FREDERICIA: 438 MS
R AXIS: -81 DEGREES
R AXIS: -82 DEGREES
RBC # BLD AUTO: 2.89 X10*6/UL (ref 4.5–5.9)
SODIUM SERPL-SCNC: 138 MMOL/L (ref 136–145)
T AXIS: 79 DEGREES
T AXIS: 82 DEGREES
T OFFSET: 408 MS
T OFFSET: 450 MS
VENTRICULAR RATE: 76 BPM
VENTRICULAR RATE: 84 BPM
WBC # BLD AUTO: 10.1 X10*3/UL (ref 4.4–11.3)

## 2025-02-12 PROCEDURE — 2500000002 HC RX 250 W HCPCS SELF ADMINISTERED DRUGS (ALT 637 FOR MEDICARE OP, ALT 636 FOR OP/ED)

## 2025-02-12 PROCEDURE — 2500000002 HC RX 250 W HCPCS SELF ADMINISTERED DRUGS (ALT 637 FOR MEDICARE OP, ALT 636 FOR OP/ED): Performed by: INTERNAL MEDICINE

## 2025-02-12 PROCEDURE — 97162 PT EVAL MOD COMPLEX 30 MIN: CPT | Mod: GP

## 2025-02-12 PROCEDURE — 85025 COMPLETE CBC W/AUTO DIFF WBC: CPT | Performed by: INTERNAL MEDICINE

## 2025-02-12 PROCEDURE — 36415 COLL VENOUS BLD VENIPUNCTURE: CPT | Performed by: INTERNAL MEDICINE

## 2025-02-12 PROCEDURE — 2500000004 HC RX 250 GENERAL PHARMACY W/ HCPCS (ALT 636 FOR OP/ED)

## 2025-02-12 PROCEDURE — 2500000001 HC RX 250 WO HCPCS SELF ADMINISTERED DRUGS (ALT 637 FOR MEDICARE OP): Performed by: INTERNAL MEDICINE

## 2025-02-12 PROCEDURE — 80053 COMPREHEN METABOLIC PANEL: CPT | Performed by: INTERNAL MEDICINE

## 2025-02-12 PROCEDURE — 97165 OT EVAL LOW COMPLEX 30 MIN: CPT | Mod: GO

## 2025-02-12 PROCEDURE — 2500000001 HC RX 250 WO HCPCS SELF ADMINISTERED DRUGS (ALT 637 FOR MEDICARE OP)

## 2025-02-12 PROCEDURE — 1200000002 HC GENERAL ROOM WITH TELEMETRY DAILY

## 2025-02-12 RX ADMIN — POLYETHYLENE GLYCOL 3350 17 G: 17 POWDER, FOR SOLUTION ORAL at 09:08

## 2025-02-12 RX ADMIN — LEVOTHYROXINE SODIUM 88 MCG: 88 TABLET ORAL at 05:46

## 2025-02-12 RX ADMIN — HEPARIN SODIUM 5000 UNITS: 5000 INJECTION INTRAVENOUS; SUBCUTANEOUS at 02:27

## 2025-02-12 RX ADMIN — HEPARIN SODIUM 5000 UNITS: 5000 INJECTION INTRAVENOUS; SUBCUTANEOUS at 09:08

## 2025-02-12 RX ADMIN — ACETAMINOPHEN 650 MG: 325 TABLET, FILM COATED ORAL at 16:59

## 2025-02-12 RX ADMIN — ATORVASTATIN CALCIUM 10 MG: 10 TABLET, FILM COATED ORAL at 20:59

## 2025-02-12 RX ADMIN — TAMSULOSIN HYDROCHLORIDE 0.4 MG: 0.4 CAPSULE ORAL at 09:08

## 2025-02-12 RX ADMIN — METOPROLOL SUCCINATE 25 MG: 25 TABLET, EXTENDED RELEASE ORAL at 09:08

## 2025-02-12 RX ADMIN — HEPARIN SODIUM 5000 UNITS: 5000 INJECTION INTRAVENOUS; SUBCUTANEOUS at 17:00

## 2025-02-12 RX ADMIN — FERROUS SULFATE TAB 325 MG (65 MG ELEMENTAL FE) 325 MG: 325 (65 FE) TAB at 12:37

## 2025-02-12 ASSESSMENT — COGNITIVE AND FUNCTIONAL STATUS - GENERAL
MOBILITY SCORE: 16
MOVING FROM LYING ON BACK TO SITTING ON SIDE OF FLAT BED WITH BEDRAILS: A LITTLE
PERSONAL GROOMING: A LITTLE
HELP NEEDED FOR BATHING: A LITTLE
DRESSING REGULAR UPPER BODY CLOTHING: A LITTLE
STANDING UP FROM CHAIR USING ARMS: A LOT
WALKING IN HOSPITAL ROOM: A LOT
CLIMB 3 TO 5 STEPS WITH RAILING: A LOT
EATING MEALS: A LITTLE
TOILETING: A LITTLE
MOVING TO AND FROM BED TO CHAIR: A LOT
STANDING UP FROM CHAIR USING ARMS: A LITTLE
DAILY ACTIVITIY SCORE: 18
DRESSING REGULAR UPPER BODY CLOTHING: A LITTLE
STANDING UP FROM CHAIR USING ARMS: A LITTLE
HELP NEEDED FOR BATHING: A LITTLE
TOILETING: A LOT
MOVING TO AND FROM BED TO CHAIR: A LITTLE
CLIMB 3 TO 5 STEPS WITH RAILING: TOTAL
PERSONAL GROOMING: A LITTLE
WALKING IN HOSPITAL ROOM: A LITTLE
DRESSING REGULAR LOWER BODY CLOTHING: A LOT
TOILETING: A LITTLE
WALKING IN HOSPITAL ROOM: A LITTLE
MOBILITY SCORE: 16
DRESSING REGULAR LOWER BODY CLOTHING: A LITTLE
DAILY ACTIVITIY SCORE: 16
DAILY ACTIVITIY SCORE: 20
MOBILITY SCORE: 18
CLIMB 3 TO 5 STEPS WITH RAILING: A LOT
DRESSING REGULAR LOWER BODY CLOTHING: A LITTLE
TURNING FROM BACK TO SIDE WHILE IN FLAT BAD: A LITTLE
DRESSING REGULAR UPPER BODY CLOTHING: A LITTLE
HELP NEEDED FOR BATHING: A LOT
MOVING TO AND FROM BED TO CHAIR: A LOT

## 2025-02-12 ASSESSMENT — PAIN SCALES - GENERAL
PAINLEVEL_OUTOF10: 0 - NO PAIN
PAINLEVEL_OUTOF10: 0 - NO PAIN
PAINLEVEL_OUTOF10: 5 - MODERATE PAIN
PAINLEVEL_OUTOF10: 0 - NO PAIN

## 2025-02-12 ASSESSMENT — PAIN - FUNCTIONAL ASSESSMENT
PAIN_FUNCTIONAL_ASSESSMENT: 0-10
PAIN_FUNCTIONAL_ASSESSMENT: 0-10

## 2025-02-12 NOTE — CARE PLAN
The patient's goals for the shift include      The clinical goals for the shift include [place] comfort and safety      Problem: Pain - Adult  Goal: Verbalizes/displays adequate comfort level or baseline comfort level  Outcome: Progressing     Problem: Safety - Adult  Goal: Free from fall injury  Outcome: Progressing     Problem: Discharge Planning  Goal: Discharge to home or other facility with appropriate resources  Outcome: Progressing     Problem: Chronic Conditions and Co-morbidities  Goal: Patient's chronic conditions and co-morbidity symptoms are monitored and maintained or improved  Outcome: Progressing     Problem: Nutrition  Goal: Nutrient intake appropriate for maintaining nutritional needs  Outcome: Progressing     Problem: Fall/Injury  Goal: Not fall by end of shift  Outcome: Progressing  Goal: Be free from injury by end of the shift  Outcome: Progressing  Goal: Verbalize understanding of personal risk factors for fall in the hospital  Outcome: Progressing  Goal: Verbalize understanding of risk factor reduction measures to prevent injury from fall in the home  Outcome: Progressing  Goal: Use assistive devices by end of the shift  Outcome: Progressing  Goal: Pace activities to prevent fatigue by end of the shift  Outcome: Progressing

## 2025-02-12 NOTE — PROGRESS NOTES
Physical Therapy    Physical Therapy Evaluation    Patient Name: Israel Franks  MRN: [id number]  Today's Date: 2/12/2025   Time Calculation  Start Time: 0925  Stop Time: 0940  Time Calculation (min): 15 min  904/904-A    Assessment/Plan   PT Assessment  PT Assessment Results: Decreased strength, Decreased range of motion, Decreased endurance, Impaired balance, Decreased mobility, Pain  Rehab Prognosis: Good  Evaluation/Treatment Tolerance: Patient limited by fatigue, Patient limited by pain  Strengths: Ability to acquire knowledge, Living arrangement secure  Barriers to Participation: Comorbidities  End of Session Communication: Bedside nurse  Assessment Comment: Pt is s/p R quadriceps tendon repair and is now WBAT with knee immobilizer. Pt demos decreased strength, endurance and safety. Pt is unsafe to return home independently. Recommend high intensity therapy.  End of Session Patient Position: Up in chair, Alarm off, not on at start of session  IP OR SWING BED PT PLAN  Inpatient or Swing Bed: Inpatient  PT Plan  Treatment/Interventions: Bed mobility, Transfer training, Gait training, Balance training, Strengthening, Endurance training, Range of motion, Therapeutic exercise, Therapeutic activity, Home exercise program, Positioning  PT Plan: Ongoing PT  PT Frequency: 4 times per week  PT Discharge Recommendations: High intensity level of continued care  PT - OK to Discharge: Yes    Subjective     Current Problem:  1. Rupture of right quadriceps tendon, initial encounter  Case Request Operating Room: REPAIR, TENDON, QUADRICEPS, FOR RUPTURE    Case Request Operating Room: REPAIR, TENDON, QUADRICEPS, FOR RUPTURE      2. Nonrheumatic aortic valve stenosis  Transthoracic Echo (TTE) Complete    Transthoracic Echo (TTE) Complete    CANCELED: Transthoracic Echo (TTE) Complete    CANCELED: Transthoracic Echo (TTE) Complete      3. Pre-op evaluation  Transthoracic Echo (TTE) Complete    Transthoracic Echo (TTE) Complete     CANCELED: Transthoracic Echo (TTE) Complete    CANCELED: Transthoracic Echo (TTE) Complete        Patient Active Problem List   Diagnosis    Abnormal PSA    Abscess of skin of neck    Anemia    Aortic valve sclerosis    Atrophy, pancreas (HHS-HCC)    Benign essential hypertension    BPH (benign prostatic hyperplasia)    Depression    Osteoporosis    Dyslipidemia    Benign prostatic hyperplasia with urinary frequency    Greater trochanteric bursitis, right    Hypothyroidism    Kyphoscoliosis    Megaloblastic anemia    Nonrheumatic aortic valve stenosis    PVC (premature ventricular contraction)    Sinus arrhythmia    Skin lesion    Trigger finger, right    Weight loss    Bilateral leg edema    Bile duct stricture (CMS-HCC)    Bile leak    Calculus of common bile duct with acute pancreatitis (HHS-HCC)    Chronic renal impairment, stage 2 (mild)    History of GI bleed    Chorioretinal scar of both eyes    Conjunctival cyst of left eye    Conjunctival hemorrhage of left eye    Hyperlipidemia    Intermediate stage nonexudative age-related macular degeneration of both eyes    Nuclear sclerotic cataract, left    Posterior subcapsular polar senile cataract    Presbyopia    S/P lens implant    Pancreatic insufficiency (HHS-HCC)    Essential hypertension    Retinal vascular occlusion    Need for vaccination with 20-polyvalent pneumococcal conjugate vaccine    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    Disuse osteoporosis    Dysphagia    Gilbert's syndrome    Hypotension    Injury of kidney    Jejunal ulcer    Swelling of lower extremity    Tinnitus    Medicare annual wellness visit, subsequent    Low back pain, unspecified    Rupture of right quadriceps tendon, initial encounter     General Visit Information:  General  Reason for Referral: PT eval and treat; impaired mobility s/p R quadriceps tendon repair  Referred By: Brien Iyer MD  Past Medical History Relevant to Rehab: Pt admitted 2/9 with a fall and found to have R  quadriceps tendon rupture. Pt is s/p tendon repair on 2/11 and is WBAT with knee immobilize. (PMH: htn, heart murmur, moderate to severe aortic valve calcification)  Missed Visit Reason: Patient placed on medical hold (patient scheduled for surgery this date. will await post op orders.)  Co-Treatment: OT  Co-Treatment Reason: for safety and to maximize therapeutic performance  Prior to Session Communication: Bedside nurse  Patient Position Received: Bed, 2 rail up, Alarm off, not on at start of session  General Comment: patient pleasant and cooperative to participate    Home Living:  Home Living  Type of Home:  (patient reports lives alone, 1+2 JOEL house with HR. bed/bathroom on 2nd floor, HR on stairs. 1/2 bath on 1st floor.)  Bathroom Shower/Tub:  (tub shower)  Bathroom Toilet:  (standard toilet)    Prior Level of Function:  Prior Function Per Pt/Caregiver Report  Level of [place]:  (independent in ADLs/IADLs PLOF. no AD use PLOF. laundry in basement. drives. owns WW and cane. standard bed. denies other falls. reports has supportive friends, but none can stay with him, reports too much clutter in his home.)    Precautions:  Precautions  LE Weight Bearing Status: Weight Bearing as Tolerated  Medical Precautions: Fall precautions  Precautions Comment: knee immobilizer RLE     Objective     Pain:  Pain Assessment  Pain Assessment: 0-10  0-10 (Numeric) Pain Score: 5 - Moderate pain (0/10 at rest and 5/10 with weight bearing)  Pain Type: Acute pain, Surgical pain  Pain Location: Knee  Pain Orientation: Right    Cognition:  Cognition  Overall Cognitive Status: Within Functional Limits  Orientation Level: Oriented X4    General Assessments:      Activity Tolerance  Endurance: Endurance does not limit participation in activity  Sensation  Light Touch: No apparent deficits  Strength  Strength Comments: LLE WFL; RLE not tested due to immobilizer    Functional Assessments:     Bed Mobility  Bed Mobility:  (completed  supine to sit with Maritza for RLE management. Denies dizziness.)  Transfers  Transfer:  (from EOB to FWW with Maritza x1 and cues for hand placement. Denies dizziness. Demos decreased WB on RLE due to pain.)  Ambulation/Gait Training  Ambulation/Gait Training Performed:  (completed ~40'x1 with use of FWW and Maritza x1 for balance and FWW management cues. Demos step to pattern and antalgic on RLE.)        Extremity/Trunk Assessments:     LLE   LLE : Within Functional Limits    Outcome Measures:     [facility] Basic Mobility  Turning from your back to your side while in a flat bed without using bedrails: A little  Moving from lying on your back to sitting on the side of a flat bed without using bedrails: A little  Moving to and from bed to chair (including a wheelchair): A little  Standing up from a chair using your arms (e.g. wheelchair or bedside chair): A little  To walk in hospital room: A little  Climbing 3-5 steps with railing: Total  Basic Mobility - Total Score: 16       Goals:  Encounter Problems       Encounter Problems (Active)       PT Problem       PT Goal 1 STG - Pt will transition supine <> sitting with MOD I  (Progressing)       Start:  02/12/25    Expected End:  02/26/25            PT Goal 2 STG - Pt will transfer STS with supervision  (Progressing)       Start:  02/12/25    Expected End:  02/26/25            PT Goal 3 STG - Pt will amb 75' using FWW with supervision  (Progressing)       Start:  02/12/25    Expected End:  02/26/25            PT Goal 4 STG - Pt will perform a B LE ther ex program of 2-3 sets of 10  (Progressing)       Start:  02/12/25    Expected End:  02/26/25               Pain - Adult            Education Documentation  Precautions, taught by Cristina Alicea PT at 2/12/2025  2:16 PM.  Learner: Patient  Readiness: Acceptance  Method: Explanation  Response: Verbalizes Understanding, Needs Reinforcement    Mobility Training, taught by Cristina Alicea PT at 2/12/2025  2:16 PM.  Learner:  Patient  Readiness: Acceptance  Method: Explanation  Response: Verbalizes Understanding, Needs Reinforcement    Education Comments  No comments found.

## 2025-02-12 NOTE — PROGRESS NOTES
Occupational Therapy    Evaluation    Patient Name: Israel Franks  MRN: [id number]  Department: [facility]  Room: [address] [place] [address]  Today's Date: 2/12/2025  Time Calculation  Start Time: 0924  Stop Time: 0940  Time Calculation (min): 16 min        Assessment:  End of Session Communication: Bedside nurse  End of Session Patient Position: Up in chair, Alarm off, not on at start of session (call light in reach, all needs met)  OT Assessment Results: Decreased ADL status, Decreased upper extremity strength, Decreased endurance, Decreased functional mobility  Strengths: Living arrangement secure, Premorbid level of function, Physical health  Barriers to Participation: Comorbidities  Plan:  Treatment Interventions: ADL retraining, Functional transfer training, UE strengthening/ROM, Endurance training, Equipment evaluation/education, Compensatory technique education  OT Frequency: 4 times per week  OT Discharge Recommendations: High intensity level of continued care  OT - OK to Discharge: Yes (once medically appropriate to next level of care)  Treatment Interventions: ADL retraining, Functional transfer training, UE strengthening/ROM, Endurance training, Equipment evaluation/education, Compensatory technique education    Subjective   Current Problem:  1. Rupture of right quadriceps tendon, initial encounter  Case Request Operating Room: REPAIR, TENDON, QUADRICEPS, FOR RUPTURE    Case Request Operating Room: REPAIR, TENDON, QUADRICEPS, FOR RUPTURE      2. Nonrheumatic aortic valve stenosis  Transthoracic Echo (TTE) Complete    Transthoracic Echo (TTE) Complete    CANCELED: Transthoracic Echo (TTE) Complete    CANCELED: Transthoracic Echo (TTE) Complete      3. Pre-op evaluation  Transthoracic Echo (TTE) Complete    Transthoracic Echo (TTE) Complete    CANCELED: Transthoracic Echo (TTE) Complete    CANCELED: Transthoracic Echo (TTE) Complete        General:  General  Reason for Referral: OT eval and tx; ADLs. dx; postop day 2 status post  "right quadriceps repair. MRI knee right: High-grade, near full-thickness tear of the distal quadriceps tendon with a few fibers of the vastus lateralis remaining intact. xray knee right: Prepatellar soft tissue swelling as well as soft tissue swelling in the region of the distal quadriceps. chest x-ray: Emphysematous changes with chronic appearing interstitial changes.  Referred By: Jaylon  Past Medical History Relevant to Rehab: 85 y.o. male with a past medical history of hypertension, hyperlipidemia, hypothyroidism, megaloblastic anemia, chronic renal insufficiency, Gilbert's syndrome, BPH, and greater trochanteric bursitis who presents to the emergency department for evaluation of knee pain.  Of note, patient recently seen in his orthopedic surgery office on 1/28 for right knee injury.  Patient slipped while walking into his house on 1/16 and twisted his knee.  Patient was seen at an urgent care.  His x-rays were negative for fracture and he was told to follow-up with Ortho.  Patient was diagnosed with a likely right distal quadricep tendon rupture.  He was then placed into a T scope locked in extension. MRI ordered 1/31- results below.  Patient states that the brace he was given from his orthopedic office was \"hard to get on\" and so he has been using just a sleeve on it.  Patient denies current pain.  Patient states he is still able to ambulate on his own.  He denies fever/chills, headache, dizziness, URI symptoms, chest pain, shortness of breath, palpitations, abdominal pain, nausea/vomiting, diarrhea, constipation, urinary symptoms, numbness/tingling, weakness.  He denies tobacco use, states that he drinks wine occasionally on the weekends.  Co-Treatment: PT  Co-Treatment Reason: for safety and to maximize therapeutic performance  Prior to Session Communication: Bedside nurse  Patient Position Received: Bed, 2 rail up, Alarm off, not on at start of session  General Comment: patient pleasant and cooperative to " participate  Precautions:  Medical Precautions: Fall precautions (tele, OOB with assist; per ortho note:Weightbearing as tolerated right lower extremity with knee immobilizer in place)        Pain:  Pain Assessment  Pain Assessment:  (denies pain at rest, 5/10 pain at surgical site with movement; repositioned EOS for comfort)    Objective   Cognition:  Overall Cognitive Status: Within Functional Limits           Home Living:  Type of Home:  (patient reports lives alone, 1+2 JOEL house with HR. bed/bathroom on 2nd floor, HR on stairs. 1/2 bath on 1st floor.)  Bathroom Shower/Tub:  (tub shower)  Bathroom Toilet:  (standard toilet)  Prior Function:  Level of [place]:  (independent in ADLs/IADLs PLOF. no AD use PLOF. laundry in basement. drives. owns WW and cane. standard bed. denies other falls. reports has supportive friends, but none can stay with him, reports too much clutter in his home.)       ADL:  ADL Comments: anticipate MIN-MOD A for ADLs based on performance with transfers/mobility this date  Activity Tolerance:  Endurance: Endurance does not limit participation in activity  Bed Mobility/Transfers: Bed Mobility  Bed Mobility:  (completed supine to sit with MIN A)    Transfers  Transfer:  (completed STS with MIN A x 1 with WW)      Functional Mobility:  Functional Mobility  Functional Mobility Performed:  (completed simple mobility with MIN A x 1 with WW; requires cues for proper body mechanics/techniques, demo fair carryover overall)     Sensation:  Light Touch: No apparent deficits  Strength:  Strength Comments: BUE ROM/MMT WFL for patient age as seen through transfers/mobility this date      Outcome Measures:[facility] Daily Activity  Putting on and taking off regular lower body clothing: A lot  Bathing (including washing, rinsing, drying): A lot  Putting on and taking off regular upper body clothing: A little  Toileting, which includes using toilet, bedpan or urinal: A lot  Taking care of personal grooming  such as brushing teeth: A little  Eating Meals: None  Daily Activity - Total Score: 16        Education Documentation  Body Mechanics, taught by Janette Redmond OT at 2/12/2025 12:36 PM.  Learner: Patient  Readiness: Acceptance  Method: Explanation  Response: Verbalizes Understanding, Needs Reinforcement    Precautions, taught by Janette Redmond OT at 2/12/2025 12:36 PM.  Learner: Patient  Readiness: Acceptance  Method: Explanation  Response: Verbalizes Understanding, Needs Reinforcement    ADL Training, taught by Janette Redmond OT at 2/12/2025 12:36 PM.  Learner: Patient  Readiness: Acceptance  Method: Explanation  Response: Verbalizes Understanding, Needs Reinforcement    Education Comments  No comments found.        OP EDUCATION:       Goals:  Encounter Problems       Encounter Problems (Active)       OT Goals       STG- patient will complete toileting at SBA with use of ae/ad/dme prn (Progressing)       Start:  02/12/25    Expected End:  02/26/25            STG- patient will complete LB dressing at SBA with use of ae/ad/dme prn (Progressing)       Start:  02/12/25    Expected End:  02/26/25            STG- patient will complete transfers to/from bed, chair, commode at SBA with use of ae/ad/dme prn (Progressing)       Start:  02/12/25    Expected End:  02/26/25            STG- patient will complete simple mobility at SBA with use of ae/ad/dme prn (Progressing)       Start:  02/12/25    Expected End:  02/26/25            STG- patient will complete grooming at SBA with use of ae/ad/dme prn (Progressing)       Start:  02/12/25    Expected End:  02/26/25

## 2025-02-12 NOTE — PROGRESS NOTES
Israel Franks is a 85 y.o. male on day 2 of admission presenting with Rupture of right quadriceps tendon, initial encounter.      Subjective   Patient seen and examined by me.  Patient is resting in bed and denies any acute complaints.  His blood pressure is stable at 115/67.  He is afebrile.  This is postop day #1 after his repair of the rupture of right quadriceps tendon and hamstrings repair.  We will await Ortho to order PT OT for a.m. and his weightbearing status.  Patient plans to go to ECF/SNF on discharge.  Social service consultation will be obtained for discharge planning.  Vitals and labs noted.       Objective     Last Recorded Vitals  /67   Pulse 75   Temp 37.6 °C (99.7 °F) (Temporal)   Resp 16   Wt 54.4 kg (119 lb 14.9 oz)   SpO2 95%   Intake/Output last 3 Shifts:    Intake/Output Summary (Last 24 hours) at 2/11/2025 1918  Last data filed at 2/11/2025 1200  Gross per 24 hour   Intake 480 ml   Output 600 ml   Net -120 ml       Admission Weight  Weight: 54.4 kg (120 lb) (02/09/25 0850)    Daily Weight  02/11/25 : 54.4 kg (119 lb 14.9 oz)    Image Results  Electrocardiogram, 12-lead PRN ACS symptoms  Sinus rhythm  Atrial premature complexes in couplets  Prolonged NM interval  RBBB and LAFB  ST elevation, consider inferior injury      Physical Exam  Vitals and nursing note reviewed.   Constitutional:       General: He is not in acute distress.     Appearance: Normal appearance. He is normal weight. He is not ill-appearing or toxic-appearing.   HENT:      Head: Normocephalic and atraumatic.      Right Ear: Tympanic membrane and ear canal normal. There is no impacted cerumen.      Left Ear: Tympanic membrane, ear canal and external ear normal.      Nose: Nose normal. No congestion or rhinorrhea.      Mouth/Throat:      Pharynx: Oropharynx is clear. No oropharyngeal exudate or posterior oropharyngeal erythema.   Eyes:      General: No scleral icterus.        Right eye: No discharge.         Left  eye: No discharge.      Extraocular Movements: Extraocular movements intact.      Conjunctiva/sclera: Conjunctivae normal.      Pupils: Pupils are equal, round, and reactive to light.   Neck:      Vascular: No carotid bruit.   Cardiovascular:      Rate and Rhythm: Normal rate and regular rhythm.      Pulses: Normal pulses.      Heart sounds: Murmur heard.      No gallop.      Comments: Positive for 3 x 6 crescendo-decrescendo murmur in the aortic area.  Pulmonary:      Effort: Pulmonary effort is normal. No respiratory distress.      Breath sounds: Normal breath sounds. No wheezing, rhonchi or rales.   Abdominal:      General: Abdomen is flat. Bowel sounds are normal. There is no distension.      Palpations: Abdomen is soft. There is no mass.      Tenderness: There is no abdominal tenderness. There is no right CVA tenderness, left CVA tenderness, guarding or rebound.      Hernia: No hernia is present.   Musculoskeletal:         General: No swelling or tenderness. Normal range of motion.      Cervical back: Normal range of motion and neck supple. No rigidity.      Right lower leg: No edema.      Left lower leg: No edema.   Lymphadenopathy:      Cervical: No cervical adenopathy.   Skin:     General: Skin is warm.      Coloration: Skin is not jaundiced.      Findings: No erythema or rash.   Neurological:      General: No focal deficit present.      Mental Status: He is alert and oriented to person, place, and time. Mental status is at baseline.      Cranial Nerves: Cranial nerves 2-12 are intact. No cranial nerve deficit, dysarthria or facial asymmetry.      Sensory: No sensory deficit.      Motor: No weakness, tremor or seizure activity.      Coordination: Coordination is intact.      Deep Tendon Reflexes: Reflexes are normal and symmetric. Reflexes normal.   Psychiatric:         Mood and Affect: Mood normal.         Thought Content: Thought content normal.         Judgment: Judgment normal.         Relevant  Results    Current Facility-Administered Medications:     acetaminophen (Tylenol) tablet 650 mg, 650 mg, oral, q4h PRN, 650 mg at 02/11/25 1622 **OR** acetaminophen (Tylenol) oral liquid 650 mg, 650 mg, oral, q4h PRN **OR** acetaminophen (Tylenol) suppository 650 mg, 650 mg, rectal, q4h PRN, TIFFANY Alfonso-C    atorvastatin (Lipitor) tablet 10 mg, 10 mg, oral, Nightly, Jhoana Nuñez PA-C, 10 mg at 02/11/25 2004    ferrous sulfate (325 mg ferrous sulfate) tablet 325 mg, 325 mg, oral, Daily with lunch, TIFFANY Alfonso-C, 325 mg at 02/11/25 0947    heparin (porcine) injection 5,000 Units, 5,000 Units, subcutaneous, q8h, Jhoana Nuñez PA-C, 5,000 Units at 02/11/25 1622    [START ON 2/12/2025] levothyroxine (Synthroid, Levoxyl) tablet 88 mcg, 88 mcg, oral, Daily, Brien Iyer MD    [Transfer Hold] losartan (Cozaar) tablet 50 mg, 50 mg, oral, Daily, Brien Iyer MD    metoprolol succinate XL (Toprol-XL) 24 hr tablet 25 mg, 25 mg, oral, Daily, Brien Iyer MD, 25 mg at 02/11/25 0946    morphine injection 2 mg, 2 mg, intravenous, q4h PRN, TIFFANY Alfonso-C, 2 mg at 02/11/25 0418    ondansetron (Zofran) tablet 4 mg, 4 mg, oral, q8h PRN **OR** ondansetron (Zofran) injection 4 mg, 4 mg, intravenous, q8h PRN, TIFFANY Alfonso-C, 4 mg at 02/10/25 1415    polyethylene glycol (Glycolax, Miralax) packet 17 g, 17 g, oral, Daily, Jhoana Nuñez PA-C    tamsulosin (Flomax) 24 hr capsule 0.4 mg, 0.4 mg, oral, Daily, Jhoana Nuñez PA-C, 0.4 mg at 02/11/25 0946     Results for orders placed or performed during the hospital encounter of 02/09/25 (from the past 24 hours)   Electrocardiogram, 12-lead PRN ACS symptoms   Result Value Ref Range    Ventricular Rate 76 BPM    Atrial Rate 74 BPM    NY Interval 249 ms    QRS Duration 130 ms    QT Interval 401 ms    QTC Calculation(Bazett) 451 ms    P Axis 20 degrees    R Axis -81 degrees    T Axis 82 degrees    QRS Count 14 beats    Q Onset 249 ms    T Offset 450 ms    QTC Fredericia 433 ms      *Note: Due to a large number of results and/or encounters for the requested time period, some results have not been displayed. A complete set of results can be found in Results Review.    Electrocardiogram, 12-lead PRN ACS symptoms    Result Date: 2/11/2025  Sinus rhythm Atrial premature complexes in couplets Prolonged IL interval RBBB and LAFB ST elevation, consider inferior injury    Transthoracic Echo (TTE) Complete    Result Date: 2/10/2025   [facility], [address] [place] [address]           Tel [phone] and Fax [phone] TRANSTHORACIC ECHOCARDIOGRAM REPORT  Patient Name:       ALICIA MARSHALL        Veronique Physician:   [id number] Tenisha Leyva MD Study Date:         2/10/2025           Ordering Provider:   [id number] TENISHA LEYVA MRN/PID:            [id number]            Fellow: Accession#:         [id number]        Nurse: Date of Birth/Age:  1940 / 85      Sonographer:         Lucas Jeffers [facility],                     years                                    RDCS, FASE Gender assigned at                     Additional Staff: Birth: Height:             177.80 cm           Admit Date:          2/9/2025 Weight:             54.43 kg            Admission Status:    Observation -                                                              Priority discharge BSA / BMI:          1.68 m2 / 17.22     Encounter#:          [id number]                     kg/m2 Blood Pressure:     100/52 mmHg         Department Location: [facility] Study Type:    TRANSTHORACIC ECHO (TTE) COMPLETE Diagnosis/ICD: Nonrheumatic aortic (valve) stenosis-I35.0 Indication:    Aortic valve stenosis. CPT Code:      Echo Complete w Full [id number] Patient History: Pertinent History: HTN. AS, arrhythmia, pre-op. Study Detail: The following Echo studies were performed: 2D, M-Mode, Doppler and                color flow. Technically challenging study due to body habitus and               prominent lung artifact.  Critical Event Critical Event: Test was completed as per department protocol. Critical Finding: Critical AS. Time Test was Completed: 10:14:57 AM Notified: Dr. Weber. Attending notification time: 10:15:06 AM  PHYSICIAN INTERPRETATION: Left Ventricle: The left ventricular systolic function is normal, with a visually estimated ejection fraction of 65-70%. There are no regional left ventricular wall motion abnormalities. The left ventricular cavity size is normal. There is normal septal and normal posterior left ventricular wall thickness. Left ventricular diastolic filling is indeterminate. Left Atrium: The left atrium is mildly dilated. Right Ventricle: The right ventricle is normal in size. There is normal right ventricular global systolic function. Right Atrium: The right atrium is mildly dilated. Aortic Valve: The aortic valve is trileaflet. There is moderate to severe aortic valve cusp calcification. There is moderate aortic valve thickening. There is reduced systolic aortic valve leaflet excursion. There is aortic valve annular calcification. There is evidence of severe aortic valve stenosis. The aortic valve dimensionless index is 0.19. There is moderate aortic valve regurgitation. The peak instantaneous gradient of the aortic valve is 68 mmHg. The mean gradient of the aortic valve is 41 mmHg. Mitral Valve: The mitral valve is moderately thickened. There is severe mitral annular calcification. The peak instantaneous gradient of the mitral valve is 9 mmHg. There is mild mitral valve regurgitation. Tricuspid Valve: The tricuspid valve is structurally normal. There is trace tricuspid regurgitation. The Doppler estimated RVSP is within normal limits at 25.5 mmHg. Pulmonic Valve: The pulmonic valve is structurally normal. There is physiologic pulmonic valve regurgitation. Pericardium: Trivial  pericardial effusion. Aorta: The aortic root is normal. The aortic root is at the upper limits of normal size. In comparison to the previous echocardiogram(s): Compared with study dated 7/1/2024, Progression of aortic valve stenosis to severe.  CONCLUSIONS:  1. The left ventricular systolic function is normal, with a visually estimated ejection fraction of 65-70%.  2. Left ventricular diastolic filling is indeterminate.  3. There is normal right ventricular global systolic function.  4. The left atrium is mildly dilated.  5. There is severe mitral annular calcification.  6. Mild mitral valve regurgitation.  7. Severe aortic valve stenosis. Vmax 4.13 m/s, mean gradient 41 mmhg, DI 0.19.  8. Moderate aortic valve regurgitation.  9. Right ventricular systolic pressure is within normal limits. QUANTITATIVE DATA SUMMARY:  2D MEASUREMENTS:          Normal Ranges: Ao Root d:       3.40 cm  (2.0-3.7cm) LAs:             3.50 cm  (2.7-4.0cm) IVSd:            0.90 cm  (0.6-1.1cm) LVPWd:           0.90 cm  (0.6-1.1cm) LVIDd:           4.30 cm  (3.9-5.9cm) LVIDs:           2.30 cm LV Mass Index:   73 g/m2 LVEDV Index:     53 ml/m2 LV % FS          46.5 %  LEFT ATRIUM:                 Normal Ranges: LA Vol A4C:        38.2 ml   (22+/-6mL/m2) LA Vol Index A4C:  22.7ml/m2 LA Area A4C:       16.0 cm2 LA Major Axis A4C: 5.7 cm  RIGHT ATRIUM:          Normal Ranges: RA Area A4C:  14.0 cm2  M-MODE MEASUREMENTS:         Normal Ranges: Ao Root:             3.60 cm (2.0-3.7cm) LAs:                 4.00 cm (2.7-4.0cm)  AORTA MEASUREMENTS:         Normal Ranges: Asc Ao, d:          3.20 cm (2.1-3.4cm)  LV SYSTOLIC FUNCTION:                      Normal Ranges: EF-A4C View:    68 % (>=55%) EF-A2C View:    76 % EF-Biplane:     73 % EF-Visual:      68 % LV EF Reported: 68 %  LV DIASTOLIC FUNCTION:           Normal Ranges: MV Peak E:             1.45 m/s  (0.7-1.2 m/s) MV Peak A:             1.27 m/s  (0.42-0.7 m/s) E/A Ratio:             1.14       (1.0-2.2) MV e'                  0.075 m/s (>8.0) MV lateral e'          0.08 m/s MV medial e'           0.07 m/s E/e' Ratio:            19.32     (<8.0)  MITRAL VALVE:          Normal Ranges: MV Vmax:      1.53 m/s (<=1.3m/s) MV peak P.4 mmHg (<5mmHg) MV mean PG:   3.0 mmHg (<2mmHg) MV DT:        220 msec (150-240msec)  AORTIC VALVE:                      Normal Ranges: AoV Vmax:                4.13 m/s  (<=1.7m/s) AoV Peak P.2 mmHg (<20mmHg) AoV Mean P.0 mmHg (1.7-11.5mmHg) LVOT Max Gallo:            0.78 m/s  (<=1.1m/s) AoV VTI:                 107.00 cm (18-25cm) LVOT VTI:                20.10 cm LVOT Diameter:           2.10 cm   (1.8-2.4cm) AoV Area, VTI:           0.65 cm2  (2.5-5.5cm2) AoV Area,Vmax:           0.65 cm2  (2.5-4.5cm2) AoV Dimensionless Index: 0.19  RIGHT VENTRICLE: RV Basal 3.30 cm TAPSE:   26.8 mm RV s'    0.15 m/s  TRICUSPID VALVE/RVSP:          Normal Ranges: Peak TR Velocity:     2.37 m/s RV Syst Pressure:     25 mmHg  (< 30mmHg) IVC Diam:             1.70 cm  PULMONIC VALVE:          Normal Ranges: PV Max Gallo:     1.1 m/s  (0.6-0.9m/s) PV Max P.6 mmHg  [id number] Jarrell Weber MD Electronically signed on 2/10/2025 at 2:28:30 PM  ** Final **     ECG 12 lead    Result Date: 2/10/2025  Sinus rhythm with 1st degree AV block with Premature supraventricular complexes Pulmonary disease pattern Left anterior fascicular block Abnormal ECG When compared with ECG of 01-AUG-2024 11:06, Fusion complexes are no longer Present Premature ventricular complexes are no longer Present Premature supraventricular complexes are now Present Right bundle branch block is no longer Present Confirmed by Jarrell Weber ([id number]) on 2/10/2025 9:34:37 AM    XR chest 1 view    Result Date: 2025  Interpreted By:  Laura Lester, STUDY: XR CHEST 1 VIEW 2025 9:39 am   INDICATION: Signs/Symptoms:pre op   COMPARISON: None available.   ACCESSION NUMBER(S): [id number]    ORDERING CLINICIAN: CURT PEÑA   TECHNIQUE: Single portable AP view chest   FINDINGS: Examination rotated accentuating the cardiac silhouette. There is mild cardiomegaly. Mild vascular calcification of the aortic knob. Surgical staples demonstrated at the level of the gastroesophageal junction. Lung fields are hyperinflated with emphysematous changes demonstrated.                 1. Emphysematous changes with chronic appearing interstitial changes.   Signed by: Laura Lester 2/9/2025 10:07 AM Dictation workstation:   [id number]    MR knee right wo IV contrast    Result Date: 1/31/2025  Interpreted By:  Jon Cronin, STUDY: MRI of the  right knee without IV contrast;  1/31/2025 3:18 pm   INDICATION: Signs/Symptoms:PATELLA QUAD RUPTURE.   ,S76.111A Strain of right quadriceps muscle, fascia and tendon, initial encounter   COMPARISON: None.   ACCESSION NUMBER(S): [id number]   ORDERING CLINICIAN: RIKY COTTRELL   TECHNIQUE: MR imaging of the right knee was obtained  without IV contrast.   FINDINGS: LIGAMENTS AND TENDONS: There is near full-thickness tear of the distal quadriceps tendon which includes the entire vastus medialis and rectus femoris tendons and the majority of the vastus lateralis and vastus intermedius tendons. A few of the vastus lateralis fibers remain intact. There is laxity of the remainder of the vastus intermedius tendons. There is significant amount of fluid at the quadriceps tendon site with retraction of the torn tendons and a gap of up to 3 cm.   The patellar tendon is intact. There is inferior subluxation of patella.   The anterior cruciate ligament and the posterior cruciate ligaments are intact. The medial collateral ligament is intact. The lateral collateral ligament, the biceps femoris tendon, the popliteus tendon and the iliotibial band are intact.   MENISCI: The medial meniscus is intact and without evidence of tear. The lateral meniscus is intact and without evidence of  tear.   JOINTS: The articular cartilage of the medial femoral condyle and medial tibial plateau is intact and without evidence of full thickness defect. The articular cartilage of the lateral femoral condyle and lateral tibial plateau is intact and without evidence of full thickness defect. The patellofemoral articulation is intact and without evidence of subluxation or articular cartilage defect. There is a moderate-sized suprapatellar joint effusion..   OSSEOUS STRUCTURES: No focal marrow replacing lesions are identified. There is no fracture.   SOFT TISSUES: There is moderate muscle edema in the distal quadriceps musculature.   There is severe subcutaneous soft tissue edema about the knee.       High-grade, near full-thickness tear of the distal quadriceps tendon with a few fibers of the vastus lateralis remaining intact. Retraction of the torn fibers with a fluid-filled gap of 3 cm. Moderate muscle edema in the distal quadriceps musculature. Moderate size suprapatellar joint effusion.   I personally reviewed the images/study and I agree with the findings as stated. This study was interpreted at [place], [place].   MACRO: None   Signed by: Jon Cronin 1/31/2025 4:30 PM Dictation workstation:   [id number]    XR knee right 4+ views    Result Date: 1/23/2025  Interpreted By:  Jani Waite, STUDY: XR KNEE RIGHT 4+ VIEWS   INDICATION: Signs/Symptoms:right knee pain after fall.   COMPARISON: None   ACCESSION NUMBER(S): [id number]   ORDERING CLINICIAN: JUDITH FENG   FINDINGS: Prepatellar soft tissue swelling as well as soft tissue swelling in the region of the distal quadriceps.   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         Prepatellar soft tissue swelling as well as soft tissue swelling in the region of the distal quadriceps.   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   Signed by: Jani Waite 1/23/2025 3:42 PM  Dictation workstation:   [id number]         Assessment/Plan                 Assessment & Plan  Rupture of right quadriceps tendon, initial encounter  1/Hx  of mechanical fall resulting in rupture of right quadriceps tendon and failed nonoperative treatments .  In the outpatient and underwent repair of the rupture hamstrings quadriceps tendon yesterday.  Further treatment and PT OT as per Ortho team.  Social service consultation for discharge planning.  2./Moderate to severe aortic stenosis/asymptomatic and 2D echocardiogram results noted.  Patient has been seen by cardiology.  He will continue to follow-up with his outpatient cardiologist Dr. Ascencion Miranda as recommended and has an upcoming appointment.  Patient plans on rescheduling it given his recent surgery.  2D echocardiogram done today noted and has normal LV function with EF of 65 to 70% and severe mitral annular calcification and mild mitral valve regurgitation and severe aortic valve stenosis and moderate aortic valve regurgitation.  3./Acute on chronic anemia/history of iron deficiency anemia.  Hemoglobin had dropped to 8.6 and hematocrit 27.0 yesterday and a CBC with differential will be done in AM..  Patient remains on p.o. Feosol which is his home medication.  Check repeat CBC with differential in AM.  4./History of hypertension and his blood pressure was soft on admission and will be monitored closely and his losartan is on hold.  Blood pressure today systolic is in the 1 teens.  5./Dyslipidemia/patient to be resumed on his home dosage of atorvastatin.  6./History of chronic renal insufficiency and BPH and BUN/creatinine is stable at 38 and 1.07.  Encourage p.o. fluids.  Check BMP in AM.  7./Hypothyroidism and patient has been resumed on his home dosage of levothyroxine.  8/DVT prophylaxis patient remains on subcu heparin for DVT prophylaxis.        Malnutrition Diagnosis Status: New  Malnutrition Diagnosis: Severe malnutrition related to chronic  disease or condition  Related to: hx pancreatic insufficiency  As Evidenced by: severe muscle wasting and severe subcutaneous fat loss  I agree with the dietitian's malnutrition diagnosis.  Medications reconciled.  Will await Ortho to start PT OT.  Discharge to SNF ECF and social service consultation and check repeat labs in AM.  Total time spent 30 minutes/time spent on patient interaction/counseling/assessment and plan and documentation.       Brien Iyer MD

## 2025-02-12 NOTE — CARE PLAN
This is an 85-year-old male postop day 2 status post right quadriceps repair.    - Weightbearing as tolerated right lower extremity with knee immobilizer in place.  - Mepilex dressing to the right knee can stay in place until 2-week postoperative visit.  - Recommend DVT prophylaxis x 4 weeks.  - Patient will need 2-week postoperative visit.  Office can be contacted at [phone].

## 2025-02-12 NOTE — CARE PLAN
The patient's goals for the shift include  Rest    The clinical goals for the shift include Maintian comfort and safety      Problem: Pain - Adult  Goal: Verbalizes/displays adequate comfort level or baseline comfort level  Outcome: Progressing     Problem: Safety - Adult  Goal: Free from fall injury  Outcome: Progressing     Problem: Discharge Planning  Goal: Discharge to home or other facility with appropriate resources  Outcome: Progressing     Problem: Chronic Conditions and Co-morbidities  Goal: Patient's chronic conditions and co-morbidity symptoms are monitored and maintained or improved  Outcome: Progressing     Problem: Nutrition  Goal: Nutrient intake appropriate for maintaining nutritional needs  Outcome: Progressing     Problem: Fall/Injury  Goal: Not fall by end of shift  Outcome: Progressing  Goal: Be free from injury by end of the shift  Outcome: Progressing  Goal: Verbalize understanding of personal risk factors for fall in the hospital  Outcome: Progressing  Goal: Verbalize understanding of risk factor reduction measures to prevent injury from fall in the home  Outcome: Progressing  Goal: Use assistive devices by end of the shift  Outcome: Progressing  Goal: Pace activities to prevent fatigue by end of the shift  Outcome: Progressing

## 2025-02-12 NOTE — ASSESSMENT & PLAN NOTE
1/Hx  of mechanical fall resulting in rupture of right quadriceps tendon and failed nonoperative treatments in the outpatient and underwent repair of the rupture hamstrings quadriceps tendon/postop day #2.  As per Ortho care plan note patient has been advised weightbearing as tolerated with knee immobilizer in place.  Continue Mepilex dressing to the right knee and postop follow-up in 2 weeks with Ortho.  They have recommended DVT prophylaxis 6 for 4 weeks.  Patient states that he would like to talk to Ortho team in a.m. and nurse advised to call them so they can either call the patient or talk to him.  Patient received physical therapy and his AM-PAC score is 16.  Patient lives alone at home.  Social service and discharge care planning will be advised to see him tomorrow as patient wants to go to SNF/rehab.  Discussed with nursing on the floor.  Social service consultation for discharge planning.  2./Moderate to severe aortic stenosis/asymptomatic and 2D echocardiogram results noted.  Patient has been seen by cardiology.  He will continue to follow-up with his outpatient cardiologist Dr. Ascencion Miranda as recommended and has an upcoming appointment.  Patient plans on rescheduling it given his recent surgery.  2D echocardiogram done  noted and has normal LV function with EF of 65 to 70% and severe mitral annular calcification and mild mitral valve regurgitation and severe aortic valve stenosis and moderate aortic valve regurgitation.  3./Acute on chronic anemia/history of iron deficiency anemia.  Hemoglobin today is improved to 9.3 and hematocrit is 28..  4./History of hypertension and his blood pressure was soft on admission and will be monitored closely and his losartan is on hold.  Patient was started on metoprolol 25 mg p.o. daily by cardiology  5./Dyslipidemia/patient to be resumed on his home dosage of atorvastatin.  6./History of chronic renal insufficiency and BPH and BUN/creatinine is stable at 38 and 1.07.   Encourage p.o. fluids.  Check BMP in AM.  7./Hypothyroidism and patient has been resumed on his home dosage of levothyroxine.  8/DVT prophylaxis patient remains on subcu heparin for DVT prophylaxis.  Continue PT OT

## 2025-02-12 NOTE — PROGRESS NOTES
Israel Fransk is a 85 y.o. male on day 3 of admission presenting with Rupture of right quadriceps tendon, initial encounter.      Subjective   Patient seen and examined by me.   Patient is resting in bed and denies any acute complaints.  Patient is afebrile and blood pressure is 99/58.  His heart rate is 78.  His p.o. intake is improving.  He is in negative fluid balance 700 cc.  Labs and vitals noted.    Objective     Last Recorded Vitals  BP 99/57 (BP Location: Left arm, Patient Position: Lying)   Pulse 78   Temp (!) 38.4 °C (101.1 °F) (Temporal)   Resp 18   Wt 54.4 kg (119 lb 14.9 oz)   SpO2 96%   Intake/Output last 3 Shifts:    Intake/Output Summary (Last 24 hours) at 2/12/2025 1810  Last data filed at 2/12/2025 1131  Gross per 24 hour   Intake --   Output 700 ml   Net -700 ml       Admission Weight  Weight: 54.4 kg (120 lb) (02/09/25 0850)    Daily Weight  02/11/25 : 54.4 kg (119 lb 14.9 oz)    Image Results      Physical Exam  Vitals and nursing note reviewed.   Constitutional:       General: He is not in acute distress.     Appearance: Normal appearance. He is normal weight. He is not ill-appearing or toxic-appearing.   HENT:      Head: Normocephalic and atraumatic.      Right Ear: Tympanic membrane and ear canal normal. There is no impacted cerumen.      Left Ear: Tympanic membrane, ear canal and external ear normal.      Nose: Nose normal. No congestion or rhinorrhea.      Mouth/Throat:      Pharynx: Oropharynx is clear. No oropharyngeal exudate or posterior oropharyngeal erythema.   Eyes:      General: No scleral icterus.        Right eye: No discharge.         Left eye: No discharge.      Extraocular Movements: Extraocular movements intact.      Conjunctiva/sclera: Conjunctivae normal.      Pupils: Pupils are equal, round, and reactive to light.   Neck:      Vascular: No carotid bruit.   Cardiovascular:      Rate and Rhythm: Normal rate and regular rhythm.      Pulses: Normal pulses.      Heart  sounds: S1 normal and S2 normal. Murmur heard.      No gallop.      Comments: Positive for 3 x 6 crescendo-decrescendo murmur of aortic stenosis.  Pulmonary:      Effort: Pulmonary effort is normal. No bradypnea, accessory muscle usage or respiratory distress.      Breath sounds: Normal breath sounds and air entry. No wheezing, rhonchi or rales.   Abdominal:      General: Abdomen is flat. Bowel sounds are normal. There is no distension.      Palpations: Abdomen is soft. There is no mass.      Tenderness: There is no abdominal tenderness. There is no right CVA tenderness, left CVA tenderness, guarding or rebound.      Hernia: No hernia is present.   Musculoskeletal:         General: No swelling or tenderness. Normal range of motion.      Cervical back: Normal range of motion and neck supple. No rigidity.      Right lower leg: No edema.      Left lower leg: No edema.   Lymphadenopathy:      Cervical: No cervical adenopathy.   Skin:     General: Skin is warm.      Coloration: Skin is not jaundiced.      Findings: No erythema or rash.   Neurological:      General: No focal deficit present.      Mental Status: He is alert and oriented to person, place, and time. Mental status is at baseline.      Cranial Nerves: Cranial nerves 2-12 are intact. No cranial nerve deficit, dysarthria or facial asymmetry.      Sensory: No sensory deficit.      Motor: No weakness.      Deep Tendon Reflexes: Reflexes are normal and symmetric. Reflexes normal.      Comments: Patient is awake alert oriented x 3 and no focal deficits.   Psychiatric:         Attention and Perception: Attention and perception normal.         Mood and Affect: Mood normal. Mood is not anxious or depressed.         Speech: Speech normal.         Behavior: Behavior normal. Behavior is cooperative.         Thought Content: Thought content does not include suicidal ideation.         Cognition and Memory: Cognition and memory normal. Cognition is not impaired.          Judgment: Judgment normal.         Relevant Results    Current Facility-Administered Medications:     acetaminophen (Tylenol) tablet 650 mg, 650 mg, oral, q4h PRN, 650 mg at 02/12/25 1659 **OR** acetaminophen (Tylenol) oral liquid 650 mg, 650 mg, oral, q4h PRN **OR** acetaminophen (Tylenol) suppository 650 mg, 650 mg, rectal, q4h PRN, JERZY AlfonsoC    atorvastatin (Lipitor) tablet 10 mg, 10 mg, oral, Nightly, Jhoana Nuñez PA-C, 10 mg at 02/11/25 2004    ferrous sulfate (325 mg ferrous sulfate) tablet 325 mg, 325 mg, oral, Daily with lunch, Jhoana Nuñez PA-C, 325 mg at 02/12/25 1237    heparin (porcine) injection 5,000 Units, 5,000 Units, subcutaneous, q8h, Jhoana Nuñez PA-C, 5,000 Units at 02/12/25 1700    levothyroxine (Synthroid, Levoxyl) tablet 88 mcg, 88 mcg, oral, Daily, Brien Iyer MD, 88 mcg at 02/12/25 0546    [Transfer Hold] losartan (Cozaar) tablet 50 mg, 50 mg, oral, Daily, Brien Iyer MD    metoprolol succinate XL (Toprol-XL) 24 hr tablet 25 mg, 25 mg, oral, Daily, Brien Iyer MD, 25 mg at 02/12/25 0908    morphine injection 2 mg, 2 mg, intravenous, q4h PRN, TIFFANY Alfonso-C, 2 mg at 02/11/25 0418    ondansetron (Zofran) tablet 4 mg, 4 mg, oral, q8h PRN **OR** ondansetron (Zofran) injection 4 mg, 4 mg, intravenous, q8h PRN, TIFFANY Alfonso-C, 4 mg at 02/10/25 1415    polyethylene glycol (Glycolax, Miralax) packet 17 g, 17 g, oral, Daily, Jhoana Nuñez, PA-C, 17 g at 02/12/25 0908    tamsulosin (Flomax) 24 hr capsule 0.4 mg, 0.4 mg, oral, Daily, Jhoana F Null, PA-C, 0.4 mg at 02/12/25 0908   Results for orders placed or performed during the hospital encounter of 02/09/25 (from the past 24 hours)   CBC and Auto Differential   Result Value Ref Range    WBC 10.1 4.4 - 11.3 x10*3/uL    nRBC 0.0 0.0 - 0.0 /100 WBCs    RBC 2.89 (L) 4.50 - 5.90 x10*6/uL    Hemoglobin 9.3 (L) 13.5 - 17.5 g/dL    Hematocrit 28.0 (L) 41.0 - 52.0 %    MCV 97 80 - 100 fL    MCH 32.2 26.0 - 34.0 pg    MCHC 33.2 32.0 -  36.0 g/dL    RDW 12.5 11.5 - 14.5 %    Platelets 177 150 - 450 x10*3/uL    Neutrophils % 79.2 40.0 - 80.0 %    Immature Granulocytes %, Automated 0.7 0.0 - 0.9 %    Lymphocytes % 7.9 13.0 - 44.0 %    Monocytes % 11.8 2.0 - 10.0 %    Eosinophils % 0.0 0.0 - 6.0 %    Basophils % 0.4 0.0 - 2.0 %    Neutrophils Absolute 8.02 (H) 1.60 - 5.50 x10*3/uL    Immature Granulocytes Absolute, Automated 0.07 0.00 - 0.50 x10*3/uL    Lymphocytes Absolute 0.80 0.80 - 3.00 x10*3/uL    Monocytes Absolute 1.19 (H) 0.05 - 0.80 x10*3/uL    Eosinophils Absolute 0.00 0.00 - 0.40 x10*3/uL    Basophils Absolute 0.04 0.00 - 0.10 x10*3/uL   Comprehensive Metabolic Panel   Result Value Ref Range    Glucose 108 (H) 74 - 99 mg/dL    Sodium 138 136 - 145 mmol/L    Potassium 4.0 3.5 - 5.3 mmol/L    Chloride 105 98 - 107 mmol/L    Bicarbonate 27 21 - 32 mmol/L    Anion Gap 10 10 - 20 mmol/L    Urea Nitrogen 28 (H) 6 - 23 mg/dL    Creatinine 1.02 0.50 - 1.30 mg/dL    eGFR 72 >60 mL/min/1.73m*2    Calcium 8.1 (L) 8.6 - 10.3 mg/dL    Albumin 3.0 (L) 3.4 - 5.0 g/dL    Alkaline Phosphatase 55 33 - 136 U/L    Total Protein 5.4 (L) 6.4 - 8.2 g/dL    AST 16 9 - 39 U/L    Bilirubin, Total 0.8 0.0 - 1.2 mg/dL    ALT 11 10 - 52 U/L     *Note: Due to a large number of results and/or encounters for the requested time period, some results have not been displayed. A complete set of results can be found in Results Review.    Electrocardiogram, 12-lead PRN ACS symptoms    Result Date: 2/12/2025  Sinus rhythm with 1st degree AV block Right bundle branch block Left anterior fascicular block  Bifascicular block  Septal infarct , age undetermined Abnormal ECG When compared with ECG of 09-FEB-2025 18:08, Premature supraventricular complexes are no longer Present Right bundle branch block is now Present Confirmed by Jarrell Weber ([id number]) on 2/12/2025 5:51:23 PM    Electrocardiogram, 12-lead PRN ACS symptoms    Result Date: 2/12/2025  See ED provider note for full  interpretation and clinical correlation Confirmed by Jasiel Chen ([id number]) on 2/12/2025 11:41:06 AM    Transthoracic Echo (TTE) Complete    Result Date: 2/10/2025   [facility], [place] [id number]           Tel [phone] and Fax [phone] TRANSTHORACIC ECHOCARDIOGRAM REPORT  Patient Name:       LAICIA MARSHALL        Veronique Physician:   [id number] Tenisha Leyva MD Study Date:         2/10/2025           Ordering Provider:   [id number] TENISHA LEYVA MRN/PID:            [id number]            Fellow: Accession#:         [id number]        Nurse: Date of Birth/Age:  1940 / 85      Sonographer:         Lucas Jeffers ACS,                     years                                    RDCS, FASE Gender assigned at                     Additional Staff: Birth: Height:             177.80 cm           Admit Date:          2/9/2025 Weight:             54.43 kg            Admission Status:    Observation -                                                              Priority discharge BSA / BMI:          1.68 m2 / 17.22     Encounter#:          [id number]                     kg/m2 Blood Pressure:     100/52 mmHg         Department Location: [facility] Study Type:    TRANSTHORACIC ECHO (TTE) COMPLETE Diagnosis/ICD: Nonrheumatic aortic (valve) stenosis-I35.0 Indication:    Aortic valve stenosis. CPT Code:      Echo Complete w Full [id number] Patient History: Pertinent History: HTN. AS, arrhythmia, pre-op. Study Detail: The following Echo studies were performed: 2D, M-Mode, Doppler and               color flow. Technically challenging study due to body habitus and               prominent lung artifact.  Critical Event Critical Event: Test was completed as per department protocol. Critical Finding: Critical AS. Time Test was Completed: 10:14:57 AM Notified: Dr. Leyva.  Attending notification time: 10:15:06 AM  PHYSICIAN INTERPRETATION: Left Ventricle: The left ventricular systolic function is normal, with a visually estimated ejection fraction of 65-70%. There are no regional left ventricular wall motion abnormalities. The left ventricular cavity size is normal. There is normal septal and normal posterior left ventricular wall thickness. Left ventricular diastolic filling is indeterminate. Left Atrium: The left atrium is mildly dilated. Right Ventricle: The right ventricle is normal in size. There is normal right ventricular global systolic function. Right Atrium: The right atrium is mildly dilated. Aortic Valve: The aortic valve is trileaflet. There is moderate to severe aortic valve cusp calcification. There is moderate aortic valve thickening. There is reduced systolic aortic valve leaflet excursion. There is aortic valve annular calcification. There is evidence of severe aortic valve stenosis. The aortic valve dimensionless index is 0.19. There is moderate aortic valve regurgitation. The peak instantaneous gradient of the aortic valve is 68 mmHg. The mean gradient of the aortic valve is 41 mmHg. Mitral Valve: The mitral valve is moderately thickened. There is severe mitral annular calcification. The peak instantaneous gradient of the mitral valve is 9 mmHg. There is mild mitral valve regurgitation. Tricuspid Valve: The tricuspid valve is structurally normal. There is trace tricuspid regurgitation. The Doppler estimated RVSP is within normal limits at 25.5 mmHg. Pulmonic Valve: The pulmonic valve is structurally normal. There is physiologic pulmonic valve regurgitation. Pericardium: Trivial pericardial effusion. Aorta: The aortic root is normal. The aortic root is at the upper limits of normal size. In comparison to the previous echocardiogram(s): Compared with study dated 7/1/2024, Progression of aortic valve stenosis to severe.  CONCLUSIONS:  1. The left ventricular systolic  function is normal, with a visually estimated ejection fraction of 65-70%.  2. Left ventricular diastolic filling is indeterminate.  3. There is normal right ventricular global systolic function.  4. The left atrium is mildly dilated.  5. There is severe mitral annular calcification.  6. Mild mitral valve regurgitation.  7. Severe aortic valve stenosis. Vmax 4.13 m/s, mean gradient 41 mmhg, DI 0.19.  8. Moderate aortic valve regurgitation.  9. Right ventricular systolic pressure is within normal limits. QUANTITATIVE DATA SUMMARY:  2D MEASUREMENTS:          Normal Ranges: Ao Root d:       3.40 cm  (2.0-3.7cm) LAs:             3.50 cm  (2.7-4.0cm) IVSd:            0.90 cm  (0.6-1.1cm) LVPWd:           0.90 cm  (0.6-1.1cm) LVIDd:           4.30 cm  (3.9-5.9cm) LVIDs:           2.30 cm LV Mass Index:   73 g/m2 LVEDV Index:     53 ml/m2 LV % FS          46.5 %  LEFT ATRIUM:                 Normal Ranges: LA Vol A4C:        38.2 ml   (22+/-6mL/m2) LA Vol Index A4C:  22.7ml/m2 LA Area A4C:       16.0 cm2 LA Major Axis A4C: 5.7 cm  RIGHT ATRIUM:          Normal Ranges: RA Area A4C:  14.0 cm2  M-MODE MEASUREMENTS:         Normal Ranges: Ao Root:             3.60 cm (2.0-3.7cm) LAs:                 4.00 cm (2.7-4.0cm)  AORTA MEASUREMENTS:         Normal Ranges: Asc Ao, d:          3.20 cm (2.1-3.4cm)  LV SYSTOLIC FUNCTION:                      Normal Ranges: EF-A4C View:    68 % (>=55%) EF-A2C View:    76 % EF-Biplane:     73 % EF-Visual:      68 % LV EF Reported: 68 %  LV DIASTOLIC FUNCTION:           Normal Ranges: MV Peak E:             1.45 m/s  (0.7-1.2 m/s) MV Peak A:             1.27 m/s  (0.42-0.7 m/s) E/A Ratio:             1.14      (1.0-2.2) MV e'                  0.075 m/s (>8.0) MV lateral e'          0.08 m/s MV medial e'           0.07 m/s E/e' Ratio:            19.32     (<8.0)  MITRAL VALVE:          Normal Ranges: MV Vmax:      1.53 m/s (<=1.3m/s) MV peak P.4 mmHg (<5mmHg) MV mean PG:   3.0 mmHg  (<2mmHg) MV DT:        220 msec (150-240msec)  AORTIC VALVE:                      Normal Ranges: AoV Vmax:                4.13 m/s  (<=1.7m/s) AoV Peak P.2 mmHg (<20mmHg) AoV Mean P.0 mmHg (1.7-11.5mmHg) LVOT Max Gallo:            0.78 m/s  (<=1.1m/s) AoV VTI:                 107.00 cm (18-25cm) LVOT VTI:                20.10 cm LVOT Diameter:           2.10 cm   (1.8-2.4cm) AoV Area, VTI:           0.65 cm2  (2.5-5.5cm2) AoV Area,Vmax:           0.65 cm2  (2.5-4.5cm2) AoV Dimensionless Index: 0.19  RIGHT VENTRICLE: RV Basal 3.30 cm TAPSE:   26.8 mm RV s'    0.15 m/s  TRICUSPID VALVE/RVSP:          Normal Ranges: Peak TR Velocity:     2.37 m/s RV Syst Pressure:     25 mmHg  (< 30mmHg) IVC Diam:             1.70 cm  PULMONIC VALVE:          Normal Ranges: PV Max Gallo:     1.1 m/s  (0.6-0.9m/s) PV Max P.6 mmHg  [id number] Jarrell Weber MD Electronically signed on 2/10/2025 at 2:28:30 PM  ** Final **     ECG 12 lead    Result Date: 2/10/2025  Sinus rhythm with 1st degree AV block with Premature supraventricular complexes Pulmonary disease pattern Left anterior fascicular block Abnormal ECG When compared with ECG of 01-AUG-2024 11:06, Fusion complexes are no longer Present Premature ventricular complexes are no longer Present Premature supraventricular complexes are now Present Right bundle branch block is no longer Present Confirmed by Jarrell Weber ([id number]) on 2/10/2025 9:34:37 AM    XR chest 1 view    Result Date: 2025  Interpreted By:  Laura Lester, STUDY: XR CHEST 1 VIEW 2025 9:39 am   INDICATION: Signs/Symptoms:pre op   COMPARISON: None available.   ACCESSION NUMBER(S): [id number]   ORDERING CLINICIAN: CURT PEÑA   TECHNIQUE: Single portable AP view chest   FINDINGS: Examination rotated accentuating the cardiac silhouette. There is mild cardiomegaly. Mild vascular calcification of the aortic knob. Surgical staples demonstrated at the level of the  gastroesophageal junction. Lung fields are hyperinflated with emphysematous changes demonstrated.                 1. Emphysematous changes with chronic appearing interstitial changes.   Signed by: Laura Lester 2/9/2025 10:07 AM Dictation workstation:   [id number]    MR knee right wo IV contrast    Result Date: 1/31/2025  Interpreted By:  Jon Cronin, STUDY: MRI of the  right knee without IV contrast;  1/31/2025 3:18 pm   INDICATION: Signs/Symptoms:PATELLA QUAD RUPTURE.   ,S76.111A Strain of right quadriceps muscle, fascia and tendon, initial encounter   COMPARISON: None.   ACCESSION NUMBER(S): [id number]   ORDERING CLINICIAN: RIKY COTTRELL   TECHNIQUE: MR imaging of the right knee was obtained  without IV contrast.   FINDINGS: LIGAMENTS AND TENDONS: There is near full-thickness tear of the distal quadriceps tendon which includes the entire vastus medialis and rectus femoris tendons and the majority of the vastus lateralis and vastus intermedius tendons. A few of the vastus lateralis fibers remain intact. There is laxity of the remainder of the vastus intermedius tendons. There is significant amount of fluid at the quadriceps tendon site with retraction of the torn tendons and a gap of up to 3 cm.   The patellar tendon is intact. There is inferior subluxation of patella.   The anterior cruciate ligament and the posterior cruciate ligaments are intact. The medial collateral ligament is intact. The lateral collateral ligament, the biceps femoris tendon, the popliteus tendon and the iliotibial band are intact.   MENISCI: The medial meniscus is intact and without evidence of tear. The lateral meniscus is intact and without evidence of tear.   JOINTS: The articular cartilage of the medial femoral condyle and medial tibial plateau is intact and without evidence of full thickness defect. The articular cartilage of the lateral femoral condyle and lateral tibial plateau is intact and without evidence of full  thickness defect. The patellofemoral articulation is intact and without evidence of subluxation or articular cartilage defect. There is a moderate-sized suprapatellar joint effusion..   OSSEOUS STRUCTURES: No focal marrow replacing lesions are identified. There is no fracture.   SOFT TISSUES: There is moderate muscle edema in the distal quadriceps musculature.   There is severe subcutaneous soft tissue edema about the knee.       High-grade, near full-thickness tear of the distal quadriceps tendon with a few fibers of the vastus lateralis remaining intact. Retraction of the torn fibers with a fluid-filled gap of 3 cm. Moderate muscle edema in the distal quadriceps musculature. Moderate size suprapatellar joint effusion.   I personally reviewed the images/study and I agree with the findings as stated. This study was interpreted at [place], [place].   MACRO: None   Signed by: Jon Cronin 1/31/2025 4:30 PM Dictation workstation:   [id number]    XR knee right 4+ views    Result Date: 1/23/2025  Interpreted By:  Jani Waite, STUDY: XR KNEE RIGHT 4+ VIEWS   INDICATION: Signs/Symptoms:right knee pain after fall.   COMPARISON: None   ACCESSION NUMBER(S): [id number]   ORDERING CLINICIAN: JUDITH FENG   FINDINGS: Prepatellar soft tissue swelling as well as soft tissue swelling in the region of the distal quadriceps.   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         Prepatellar soft tissue swelling as well as soft tissue swelling in the region of the distal quadriceps.   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   Signed by: Jani Waite 1/23/2025 3:42 PM Dictation workstation:   [id number]           Assessment/Plan   This patient currently has cardiac telemetry ordered; if you would like to modify or discontinue the telemetry order, click here to go to the orders activity to modify/discontinue the order.              Assessment &  Plan  Rupture of right quadriceps tendon, initial encounter  1/Hx  of mechanical fall resulting in rupture of right quadriceps tendon and failed nonoperative treatments in the outpatient and underwent repair of the rupture hamstrings quadriceps tendon/postop day #2.  As per Ortho care plan note patient has been advised weightbearing as tolerated with knee immobilizer in place.  Continue Mepilex dressing to the right knee and postop follow-up in 2 weeks with Ortho.  They have recommended DVT prophylaxis 6 for 4 weeks.  Patient states that he would like to talk to Ortho team in a.m. and nurse advised to call them so they can either call the patient or talk to him.  Patient received physical therapy and his AM-PAC score is 16.  Patient lives alone at home.  Social service and discharge care planning will be advised to see him tomorrow as patient wants to go to SNF/rehab.  Discussed with nursing on the floor.  Social service consultation for discharge planning.  2./Moderate to severe aortic stenosis/asymptomatic and 2D echocardiogram results noted.  Patient has been seen by cardiology.  He will continue to follow-up with his outpatient cardiologist Dr. Ascencion Miranda as recommended and has an upcoming appointment.  Patient plans on rescheduling it given his recent surgery.  2D echocardiogram done  noted and has normal LV function with EF of 65 to 70% and severe mitral annular calcification and mild mitral valve regurgitation and severe aortic valve stenosis and moderate aortic valve regurgitation.  3./Acute on chronic anemia/history of iron deficiency anemia.  Hemoglobin today is improved to 9.3 and hematocrit is 28..  4./History of hypertension and his blood pressure was soft on admission and will be monitored closely and his losartan is on hold.  Patient was started on metoprolol 25 mg p.o. daily by cardiology  5./Dyslipidemia/patient to be resumed on his home dosage of atorvastatin.  6./History of chronic renal  insufficiency and BPH and BUN/creatinine is stable at 38 and 1.07.  Encourage p.o. fluids.  Check BMP in AM.  7./Hypothyroidism and patient has been resumed on his home dosage of levothyroxine.  8/DVT prophylaxis patient remains on subcu heparin for DVT prophylaxis.  Continue PT OT  Medications reconciled.  Assess patient for SNF placement.  Total time spent 22 minutes/time spent on patient interaction counseling assessment and plan and documentation and case discussed with the nursing on floor.              Brien Iyer MD

## 2025-02-12 NOTE — ASSESSMENT & PLAN NOTE
1/Hx  of mechanical fall resulting in rupture of right quadriceps tendon and failed nonoperative treatments .  In the outpatient and underwent repair of the rupture hamstrings quadriceps tendon yesterday.  Further treatment and PT OT as per Ortho team.  Social service consultation for discharge planning.  2./Moderate to severe aortic stenosis/asymptomatic and 2D echocardiogram results noted.  Patient has been seen by cardiology.  He will continue to follow-up with his outpatient cardiologist Dr. Ascencion Miranda as recommended and has an upcoming appointment.  Patient plans on rescheduling it given his recent surgery.  2D echocardiogram done today noted and has normal LV function with EF of 65 to 70% and severe mitral annular calcification and mild mitral valve regurgitation and severe aortic valve stenosis and moderate aortic valve regurgitation.  3./Acute on chronic anemia/history of iron deficiency anemia.  Hemoglobin had dropped to 8.6 and hematocrit 27.0 yesterday and a CBC with differential will be done in AM..  Patient remains on p.o. Feosol which is his home medication.  Check repeat CBC with differential in AM.  4./History of hypertension and his blood pressure was soft on admission and will be monitored closely and his losartan is on hold.  Blood pressure today systolic is in the 1 teens.  5./Dyslipidemia/patient to be resumed on his home dosage of atorvastatin.  6./History of chronic renal insufficiency and BPH and BUN/creatinine is stable at 38 and 1.07.  Encourage p.o. fluids.  Check BMP in AM.  7./Hypothyroidism and patient has been resumed on his home dosage of levothyroxine.  8/DVT prophylaxis patient remains on subcu heparin for DVT prophylaxis.

## 2025-02-13 DIAGNOSIS — Z98.890 S/P TENDON REPAIR: ICD-10-CM

## 2025-02-13 DIAGNOSIS — S76.111A QUADRICEPS TENDON RUPTURE, RIGHT, INITIAL ENCOUNTER: ICD-10-CM

## 2025-02-13 PROCEDURE — 97535 SELF CARE MNGMENT TRAINING: CPT | Mod: CQ,GP

## 2025-02-13 PROCEDURE — 2500000002 HC RX 250 W HCPCS SELF ADMINISTERED DRUGS (ALT 637 FOR MEDICARE OP, ALT 636 FOR OP/ED)

## 2025-02-13 PROCEDURE — 2500000001 HC RX 250 WO HCPCS SELF ADMINISTERED DRUGS (ALT 637 FOR MEDICARE OP)

## 2025-02-13 PROCEDURE — 2500000004 HC RX 250 GENERAL PHARMACY W/ HCPCS (ALT 636 FOR OP/ED)

## 2025-02-13 PROCEDURE — 2500000002 HC RX 250 W HCPCS SELF ADMINISTERED DRUGS (ALT 637 FOR MEDICARE OP, ALT 636 FOR OP/ED): Performed by: INTERNAL MEDICINE

## 2025-02-13 PROCEDURE — 97116 GAIT TRAINING THERAPY: CPT | Mod: CQ,GP

## 2025-02-13 PROCEDURE — 2500000001 HC RX 250 WO HCPCS SELF ADMINISTERED DRUGS (ALT 637 FOR MEDICARE OP): Performed by: INTERNAL MEDICINE

## 2025-02-13 PROCEDURE — 1200000002 HC GENERAL ROOM WITH TELEMETRY DAILY

## 2025-02-13 RX ADMIN — ATORVASTATIN CALCIUM 10 MG: 10 TABLET, FILM COATED ORAL at 20:14

## 2025-02-13 RX ADMIN — POLYETHYLENE GLYCOL 3350 17 G: 17 POWDER, FOR SOLUTION ORAL at 08:26

## 2025-02-13 RX ADMIN — HEPARIN SODIUM 5000 UNITS: 5000 INJECTION INTRAVENOUS; SUBCUTANEOUS at 09:15

## 2025-02-13 RX ADMIN — ACETAMINOPHEN 650 MG: 325 TABLET, FILM COATED ORAL at 15:23

## 2025-02-13 RX ADMIN — LEVOTHYROXINE SODIUM 88 MCG: 88 TABLET ORAL at 05:52

## 2025-02-13 RX ADMIN — HEPARIN SODIUM 5000 UNITS: 5000 INJECTION INTRAVENOUS; SUBCUTANEOUS at 02:42

## 2025-02-13 RX ADMIN — FERROUS SULFATE TAB 325 MG (65 MG ELEMENTAL FE) 325 MG: 325 (65 FE) TAB at 13:35

## 2025-02-13 RX ADMIN — TAMSULOSIN HYDROCHLORIDE 0.4 MG: 0.4 CAPSULE ORAL at 08:26

## 2025-02-13 RX ADMIN — HEPARIN SODIUM 5000 UNITS: 5000 INJECTION INTRAVENOUS; SUBCUTANEOUS at 18:29

## 2025-02-13 RX ADMIN — METOPROLOL SUCCINATE 25 MG: 25 TABLET, EXTENDED RELEASE ORAL at 08:26

## 2025-02-13 RX ADMIN — ACETAMINOPHEN 650 MG: 325 TABLET, FILM COATED ORAL at 20:14

## 2025-02-13 ASSESSMENT — COGNITIVE AND FUNCTIONAL STATUS - GENERAL
MOBILITY SCORE: 16
WALKING IN HOSPITAL ROOM: A LITTLE
MOVING FROM LYING ON BACK TO SITTING ON SIDE OF FLAT BED WITH BEDRAILS: A LITTLE
CLIMB 3 TO 5 STEPS WITH RAILING: TOTAL
TURNING FROM BACK TO SIDE WHILE IN FLAT BAD: A LITTLE
MOVING TO AND FROM BED TO CHAIR: A LITTLE
STANDING UP FROM CHAIR USING ARMS: A LITTLE

## 2025-02-13 ASSESSMENT — PAIN - FUNCTIONAL ASSESSMENT: PAIN_FUNCTIONAL_ASSESSMENT: 0-10

## 2025-02-13 ASSESSMENT — PAIN SCALES - GENERAL
PAINLEVEL_OUTOF10: 0 - NO PAIN
PAINLEVEL_OUTOF10: 0 - NO PAIN

## 2025-02-13 NOTE — PROGRESS NOTES
Met with patient at bedside. Introduced self and role as care coordinator. SNF list printed and provided for patient to review and give choices today. Care coordinator will follow up today for choices.

## 2025-02-13 NOTE — CARE PLAN
The clinical goals for the shift include Maintain Comfort and Safety Mesasures      Problem: Pain - Adult  Goal: Verbalizes/displays adequate comfort level or baseline comfort level  Outcome: Progressing     Problem: Safety - Adult  Goal: Free from fall injury  Outcome: Progressing     Problem: Discharge Planning  Goal: Discharge to home or other facility with appropriate resources  Outcome: Progressing     Problem: Chronic Conditions and Co-morbidities  Goal: Patient's chronic conditions and co-morbidity symptoms are monitored and maintained or improved  Outcome: Progressing     Problem: Fall/Injury  Goal: Not fall by end of shift  Outcome: Progressing  Goal: Be free from injury by end of the shift  Outcome: Progressing  Goal: Verbalize understanding of personal risk factors for fall in the hospital  Outcome: Progressing  Goal: Verbalize understanding of risk factor reduction measures to prevent injury from fall in the home  Outcome: Progressing  Goal: Use assistive devices by end of the shift  Outcome: Progressing  Goal: Pace activities to prevent fatigue by end of the shift  Outcome: Progressing

## 2025-02-13 NOTE — CARE PLAN
The clinical goals for the shift include safe ambulation      Problem: Pain - Adult  Goal: Verbalizes/displays adequate comfort level or baseline comfort level  Outcome: Progressing     Problem: Safety - Adult  Goal: Free from fall injury  Outcome: Progressing

## 2025-02-13 NOTE — PROGRESS NOTES
Physical Therapy    Physical Therapy Treatment    Patient Name: Israel Franks  MRN: [id number]  Department: [facility]  Room: [address] [place] [address]  Today's Date: 2/13/2025  Time Calculation  Start Time: 1501  Stop Time: 1531  Time Calculation (min): 30 min         Assessment/Plan         PT Plan  Treatment/Interventions: Bed mobility, Transfer training, Gait training, Balance training, Strengthening, Endurance training, Range of motion, Therapeutic exercise, Therapeutic activity, Home exercise program, Positioning  PT Plan: Ongoing PT  PT Frequency: 4 times per week  PT Discharge Recommendations: High intensity level of continued care  PT - OK to Discharge: Yes      General Visit Information:   PT  Visit  PT Received On: 02/13/25       Subjective                  Objective   Pain: rle - no rating                      Treatments:  Therapeutic Exercise  Therapeutic Exercise Performed:  (lle heelslides, abd   ble ap's, and glut sets 2 x 10 reps)    Bed Mobility  Bed Mobility:  (supine to sit and sit to supine min a x 1 for rle)    Ambulation/Gait Training  Ambulation/Gait Training Performed:  (ambulated 45 feet x 2 using fixed wheeled walker min a x 1   r knee immobilzer)  Transfers  Transfer:  (sit to stand min a x 1)         Outcome Measures:  [facility] Basic Mobility  Turning from your back to your side while in a flat bed without using bedrails: A little  Moving from lying on your back to sitting on the side of a flat bed without using bedrails: A little  Moving to and from bed to chair (including a wheelchair): A little  Standing up from a chair using your arms (e.g. wheelchair or bedside chair): A little  To walk in hospital room: A little  Climbing 3-5 steps with railing: Total  Basic Mobility - Total Score: 16    Education Documentation  Handouts, taught by Humera Sanabria PTA at 2/13/2025  3:32 PM.  Learner: Patient  Readiness: Acceptance  Method: Demonstration  Response: Demonstrated Understanding    Precautions, taught by Humera VICKERS  ASMITA Sanabria at 2/13/2025  3:32 PM.  Learner: Patient  Readiness: Acceptance  Method: Demonstration  Response: Demonstrated Understanding    Body Mechanics, taught by Humera Sanabria PTA at 2/13/2025  3:32 PM.  Learner: Patient  Readiness: Acceptance  Method: Demonstration  Response: Demonstrated Understanding    Home Exercise Program, taught by Humera Sanabria PTA at 2/13/2025  3:32 PM.  Learner: Patient  Readiness: Acceptance  Method: Demonstration  Response: Demonstrated Understanding    Mobility Training, taught by Humera Sanabria PTA at 2/13/2025  3:32 PM.  Learner: Patient  Readiness: Acceptance  Method: Demonstration  Response: Demonstrated Understanding    Education Comments  No comments found.             Encounter Problems       Encounter Problems (Active)       PT Problem       PT Goal 1 STG - Pt will transition supine <> sitting with MOD I  (Progressing)       Start:  02/12/25    Expected End:  02/26/25            PT Goal 2 STG - Pt will transfer STS with supervision  (Progressing)       Start:  02/12/25    Expected End:  02/26/25            PT Goal 3 STG - Pt will amb 75' using FWW with supervision  (Progressing)       Start:  02/12/25    Expected End:  02/26/25            PT Goal 4 STG - Pt will perform a B LE ther ex program of 2-3 sets of 10  (Progressing)       Start:  02/12/25    Expected End:  02/26/25               Pain - Adult

## 2025-02-14 LAB
ANION GAP SERPL CALC-SCNC: 9 MMOL/L (ref 10–20)
APPEARANCE UR: CLEAR
BILIRUB UR STRIP.AUTO-MCNC: NEGATIVE MG/DL
BUN SERPL-MCNC: 32 MG/DL (ref 6–23)
CALCIUM SERPL-MCNC: 7.9 MG/DL (ref 8.6–10.3)
CHLORIDE SERPL-SCNC: 106 MMOL/L (ref 98–107)
CO2 SERPL-SCNC: 29 MMOL/L (ref 21–32)
COLOR UR: YELLOW
CREAT SERPL-MCNC: 1 MG/DL (ref 0.5–1.3)
EGFRCR SERPLBLD CKD-EPI 2021: 73 ML/MIN/1.73M*2
ERYTHROCYTE [DISTWIDTH] IN BLOOD BY AUTOMATED COUNT: 12.8 % (ref 11.5–14.5)
GLUCOSE SERPL-MCNC: 95 MG/DL (ref 74–99)
GLUCOSE UR STRIP.AUTO-MCNC: NORMAL MG/DL
HCT VFR BLD AUTO: 29 % (ref 41–52)
HGB BLD-MCNC: 9.1 G/DL (ref 13.5–17.5)
KETONES UR STRIP.AUTO-MCNC: NEGATIVE MG/DL
LEUKOCYTE ESTERASE UR QL STRIP.AUTO: NEGATIVE
MCH RBC QN AUTO: 31.2 PG (ref 26–34)
MCHC RBC AUTO-ENTMCNC: 31.4 G/DL (ref 32–36)
MCV RBC AUTO: 99 FL (ref 80–100)
NITRITE UR QL STRIP.AUTO: NEGATIVE
NRBC BLD-RTO: 0 /100 WBCS (ref 0–0)
PH UR STRIP.AUTO: 5.5 [PH]
PLATELET # BLD AUTO: 192 X10*3/UL (ref 150–450)
POTASSIUM SERPL-SCNC: 4.2 MMOL/L (ref 3.5–5.3)
PROT UR STRIP.AUTO-MCNC: ABNORMAL MG/DL
RBC # BLD AUTO: 2.92 X10*6/UL (ref 4.5–5.9)
RBC # UR STRIP.AUTO: NEGATIVE MG/DL
RBC #/AREA URNS AUTO: NORMAL /HPF
SODIUM SERPL-SCNC: 140 MMOL/L (ref 136–145)
SP GR UR STRIP.AUTO: 1.02
UROBILINOGEN UR STRIP.AUTO-MCNC: ABNORMAL MG/DL
WBC # BLD AUTO: 8.3 X10*3/UL (ref 4.4–11.3)
WBC #/AREA URNS AUTO: NORMAL /HPF

## 2025-02-14 PROCEDURE — 80048 BASIC METABOLIC PNL TOTAL CA: CPT | Performed by: INTERNAL MEDICINE

## 2025-02-14 PROCEDURE — 85027 COMPLETE CBC AUTOMATED: CPT | Performed by: INTERNAL MEDICINE

## 2025-02-14 PROCEDURE — 2500000001 HC RX 250 WO HCPCS SELF ADMINISTERED DRUGS (ALT 637 FOR MEDICARE OP): Performed by: STUDENT IN AN ORGANIZED HEALTH CARE EDUCATION/TRAINING PROGRAM

## 2025-02-14 PROCEDURE — 2500000002 HC RX 250 W HCPCS SELF ADMINISTERED DRUGS (ALT 637 FOR MEDICARE OP, ALT 636 FOR OP/ED): Performed by: INTERNAL MEDICINE

## 2025-02-14 PROCEDURE — 2500000004 HC RX 250 GENERAL PHARMACY W/ HCPCS (ALT 636 FOR OP/ED)

## 2025-02-14 PROCEDURE — 2500000002 HC RX 250 W HCPCS SELF ADMINISTERED DRUGS (ALT 637 FOR MEDICARE OP, ALT 636 FOR OP/ED)

## 2025-02-14 PROCEDURE — 81001 URINALYSIS AUTO W/SCOPE: CPT | Performed by: INTERNAL MEDICINE

## 2025-02-14 PROCEDURE — 36415 COLL VENOUS BLD VENIPUNCTURE: CPT | Performed by: INTERNAL MEDICINE

## 2025-02-14 PROCEDURE — 2500000001 HC RX 250 WO HCPCS SELF ADMINISTERED DRUGS (ALT 637 FOR MEDICARE OP)

## 2025-02-14 PROCEDURE — 1200000002 HC GENERAL ROOM WITH TELEMETRY DAILY

## 2025-02-14 RX ADMIN — FERROUS SULFATE TAB 325 MG (65 MG ELEMENTAL FE) 325 MG: 325 (65 FE) TAB at 12:56

## 2025-02-14 RX ADMIN — METOPROLOL SUCCINATE 25 MG: 25 TABLET, EXTENDED RELEASE ORAL at 09:15

## 2025-02-14 RX ADMIN — TAMSULOSIN HYDROCHLORIDE 0.4 MG: 0.4 CAPSULE ORAL at 09:15

## 2025-02-14 RX ADMIN — HEPARIN SODIUM 5000 UNITS: 5000 INJECTION INTRAVENOUS; SUBCUTANEOUS at 02:17

## 2025-02-14 RX ADMIN — LEVOTHYROXINE SODIUM 88 MCG: 88 TABLET ORAL at 06:29

## 2025-02-14 RX ADMIN — POLYETHYLENE GLYCOL 3350 17 G: 17 POWDER, FOR SOLUTION ORAL at 09:15

## 2025-02-14 RX ADMIN — HEPARIN SODIUM 5000 UNITS: 5000 INJECTION INTRAVENOUS; SUBCUTANEOUS at 17:50

## 2025-02-14 RX ADMIN — ATORVASTATIN CALCIUM 10 MG: 10 TABLET, FILM COATED ORAL at 20:26

## 2025-02-14 RX ADMIN — HEPARIN SODIUM 5000 UNITS: 5000 INJECTION INTRAVENOUS; SUBCUTANEOUS at 09:17

## 2025-02-14 ASSESSMENT — PAIN SCALES - GENERAL
PAINLEVEL_OUTOF10: 0 - NO PAIN
PAINLEVEL_OUTOF10: 0 - NO PAIN

## 2025-02-14 NOTE — CARE PLAN
The patient's goals for the shift include      The clinical goals for the shift include remain free from injury

## 2025-02-14 NOTE — ASSESSMENT & PLAN NOTE
1/Hx  of mechanical fall resulting in rupture of right quadriceps tendon and failed nonoperative treatments in the outpatient and underwent repair of the rupture hamstrings quadriceps tendon/postop day #3.  As per Ortho care plan note patient has been advised weightbearing as tolerated with knee immobilizer in place.  Continue Mepilex dressing to the right knee and postop follow-up in 2 weeks with Ortho.  They have recommended DVT prophylaxis r 4 weeks.  Patient states that he would like to talk to Ortho team in a.m. and nurse advised to call them so they can either call the patient or talk to him.  Patient received physical therapy and his AM-PAC score is 16.  Patient lives alone at home.  Social service and discharge care planning .  Has seen the patient and discharged to SNF when bed available.  Discussed with nursing on the floor.  And discussed with  this a.m.  2./Moderate to severe aortic stenosis//soft blood pressures and remains asymptomatic and his antihypertensive medications are on hold.  Asymptomatic and 2D echocardiogram results noted.  Patient has been seen by cardiology.  He will continue to follow-up with his outpatient cardiologist Dr. Ascencion Miranda as recommended and has an upcoming appointment.  Patient plans on rescheduling it given his recent surgery.  2D echocardiogram done  noted and has normal LV function with EF of 65 to 70% and severe mitral annular calcification and mild mitral valve regurgitation and severe aortic valve stenosis and moderate aortic valve regurgitation.  3./Acute on chronic anemia/history of iron deficiency anemia.  Hemoglobin timproved to 9.3 and hematocrit is 28 yesterday..  4./History of hypertension and his blood pressure was soft on admission and will be monitored closely and his losartan is on hold.  Patient was started on metoprolol 25 mg p.o. daily by cardiology hold for systolic less than 110.  5./Dyslipidemia/patient to be resumed on his home dosage  of atorvastatin.  6./History of chronic renal insufficiency and BPH and BUN/creatinine is stable at 38 and 1.07.  Encourage p.o. fluids.    7./Hypothyroidism and patient has been resumed on his home dosage of levothyroxine.  8/DVT prophylaxis patient remains on subcu heparin for DVT prophylaxis.  Continue PT OT  8/low-grade fever/patient has no productive cough or dysuria.  Will check UA with culture.  Check CBC with differential in AM.  Continue to monitor fever.  Incentive spirometry will be ordered.  Medications reconciled.  Total time spent 20 minutes.  Time spent on patient interaction counseling assessment and plan and documentation.

## 2025-02-14 NOTE — PROGRESS NOTES
Met with patient at bedside. Patient gave choices from list previously provided. #1 [place], #2 [place]. Requested referral be placed in care port via [facility].

## 2025-02-14 NOTE — PROGRESS NOTES
Israel Franks is a 85 y.o. male on day 4 of admission presenting with Rupture of right quadriceps tendon, initial encounter.      Subjective   Patient seen and examined by me.  Patient is resting in bed.  He denies any cough or dysuria.  He had low-grade fever earlier and is afebrile currently.  Blood pressure is borderline running at 92/50 but he has severe aortic stenosis.  His losartan is on hold.  Patient is receiving physical therapy and has been seen by  and  and has decided to go to [facility] and will await bed availability and discharged when bed available.  Will continue to monitor his low-grade fever and check a UA and culture today.   P.o. intake is good.  Patient remains in negative fluid balance 600 cc.    Objective     Last Recorded Vitals  BP 92/50   Pulse 73   Temp 37.7 °C (99.9 °F)   Resp 18   Wt 54.4 kg (119 lb 14.9 oz)   SpO2 96%   Intake/Output last 3 Shifts:    Intake/Output Summary (Last 24 hours) at 2/13/2025 2116  Last data filed at 2/13/2025 2021  Gross per 24 hour   Intake --   Output 600 ml   Net -600 ml       Admission Weight  Weight: 54.4 kg (120 lb) (02/09/25 0850)    Daily Weight  02/11/25 : 54.4 kg (119 lb 14.9 oz)    Image Results      Physical Exam  Vitals and nursing note reviewed.   Constitutional:       General: He is awake. He is not in acute distress.     Appearance: Normal appearance. He is well-groomed and normal weight. He is not ill-appearing or toxic-appearing.      Comments: Patient is awake alert oriented x 3 and   HENT:      Head: Normocephalic and atraumatic.      Right Ear: Tympanic membrane and ear canal normal. There is no impacted cerumen.      Left Ear: Tympanic membrane, ear canal and external ear normal.      Nose: Nose normal. No congestion or rhinorrhea.      Mouth/Throat:      Pharynx: Oropharynx is clear. No oropharyngeal exudate or posterior oropharyngeal erythema.   Eyes:      General: No scleral icterus.        Right  eye: No discharge.         Left eye: No discharge.      Extraocular Movements: Extraocular movements intact.      Conjunctiva/sclera: Conjunctivae normal.      Pupils: Pupils are equal, round, and reactive to light.   Neck:      Vascular: No carotid bruit.   Cardiovascular:      Rate and Rhythm: Normal rate and regular rhythm.      Pulses: Normal pulses.      Heart sounds: Murmur heard.      No gallop.      Comments: Positive for 3 x 6 crescendo-decrescendo murmur in the aortic area.  Pulmonary:      Effort: Pulmonary effort is normal. No accessory muscle usage or respiratory distress.      Breath sounds: Normal breath sounds. No wheezing, rhonchi or rales.   Abdominal:      General: Abdomen is flat. Bowel sounds are normal. There is no distension.      Palpations: Abdomen is soft. There is no mass.      Tenderness: There is no abdominal tenderness. There is no right CVA tenderness, left CVA tenderness, guarding or rebound.      Hernia: No hernia is present.   Musculoskeletal:         General: No swelling or tenderness. Normal range of motion.      Cervical back: Normal range of motion and neck supple. No rigidity.      Right lower leg: No edema.      Left lower leg: No edema.   Lymphadenopathy:      Cervical: No cervical adenopathy.   Skin:     General: Skin is warm.      Coloration: Skin is not jaundiced.      Findings: No erythema or rash.   Neurological:      General: No focal deficit present.      Mental Status: He is alert and oriented to person, place, and time. Mental status is at baseline.      Sensory: No sensory deficit.      Motor: No weakness.      Gait: Gait normal.      Deep Tendon Reflexes: Reflexes normal.   Psychiatric:         Mood and Affect: Mood normal.         Behavior: Behavior is cooperative.         Thought Content: Thought content normal.         Judgment: Judgment normal.         Relevant Results    Current Facility-Administered Medications:     acetaminophen (Tylenol) tablet 650 mg, 650  mg, oral, q4h PRN, 650 mg at 02/13/25 2014 **OR** acetaminophen (Tylenol) oral liquid 650 mg, 650 mg, oral, q4h PRN **OR** acetaminophen (Tylenol) suppository 650 mg, 650 mg, rectal, q4h PRN, Jhoana Nuñez, PA-C    atorvastatin (Lipitor) tablet 10 mg, 10 mg, oral, Nightly, Jhoana Nuñez, PA-C, 10 mg at 02/13/25 2014    ferrous sulfate (325 mg ferrous sulfate) tablet 325 mg, 325 mg, oral, Daily with lunch, Jhoana Nuñez, PA-C, 325 mg at 02/13/25 1335    heparin (porcine) injection 5,000 Units, 5,000 Units, subcutaneous, q8h, Jhoana Nuñez PA-C, 5,000 Units at 02/13/25 1829    levothyroxine (Synthroid, Levoxyl) tablet 88 mcg, 88 mcg, oral, Daily, Brien Iyer MD, 88 mcg at 02/13/25 0552    [Transfer Hold] losartan (Cozaar) tablet 50 mg, 50 mg, oral, Daily, Brien Iyer MD    metoprolol succinate XL (Toprol-XL) 24 hr tablet 25 mg, 25 mg, oral, Daily, Brien Iyer MD, 25 mg at 02/13/25 0826    morphine injection 2 mg, 2 mg, intravenous, q4h PRN, Jhoana Nuñez, PA-C, 2 mg at 02/11/25 0418    ondansetron (Zofran) tablet 4 mg, 4 mg, oral, q8h PRN **OR** ondansetron (Zofran) injection 4 mg, 4 mg, intravenous, q8h PRN, Jhoana F Joaquin, PA-C, 4 mg at 02/10/25 1415    polyethylene glycol (Glycolax, Miralax) packet 17 g, 17 g, oral, Daily, Jhoana F Joaquin, PA-C, 17 g at 02/13/25 0826    tamsulosin (Flomax) 24 hr capsule 0.4 mg, 0.4 mg, oral, Daily, Jhoana F Joaquin, PA-C, 0.4 mg at 02/13/25 0826   Results for orders placed or performed during the hospital encounter of 02/09/25 (from the past 96 hours)   Urinalysis with Reflex Culture and Microscopic   Result Value Ref Range    Color, Urine Yellow Light-Yellow, Yellow, Dark-Yellow    Appearance, Urine Clear Clear    Specific Gravity, Urine 1.019 1.005 - 1.035    pH, Urine 5.0 5.0, 5.5, 6.0, 6.5, 7.0, 7.5, 8.0    Protein, Urine NEGATIVE NEGATIVE, 10 (TRACE), 20 (TRACE) mg/dL    Glucose, Urine Normal Normal mg/dL    Blood, Urine NEGATIVE NEGATIVE mg/dL    Ketones, Urine NEGATIVE NEGATIVE  mg/dL    Bilirubin, Urine NEGATIVE NEGATIVE mg/dL    Urobilinogen, Urine Normal Normal mg/dL    Nitrite, Urine NEGATIVE NEGATIVE    Leukocyte Esterase, Urine NEGATIVE NEGATIVE   CBC   Result Value Ref Range    WBC 7.7 4.4 - 11.3 x10*3/uL    nRBC 0.0 0.0 - 0.0 /100 WBCs    RBC 2.73 (L) 4.50 - 5.90 x10*6/uL    Hemoglobin 8.6 (L) 13.5 - 17.5 g/dL    Hematocrit 27.0 (L) 41.0 - 52.0 %    MCV 99 80 - 100 fL    MCH 31.5 26.0 - 34.0 pg    MCHC 31.9 (L) 32.0 - 36.0 g/dL    RDW 12.9 11.5 - 14.5 %    Platelets 186 150 - 450 x10*3/uL   Basic metabolic panel   Result Value Ref Range    Glucose 101 (H) 74 - 99 mg/dL    Sodium 137 136 - 145 mmol/L    Potassium 4.6 3.5 - 5.3 mmol/L    Chloride 107 98 - 107 mmol/L    Bicarbonate 25 21 - 32 mmol/L    Anion Gap 10 10 - 20 mmol/L    Urea Nitrogen 38 (H) 6 - 23 mg/dL    Creatinine 1.07 0.50 - 1.30 mg/dL    eGFR 68 >60 mL/min/1.73m*2    Calcium 8.4 (L) 8.6 - 10.3 mg/dL   Transthoracic Echo (TTE) Complete   Result Value Ref Range    AV pk buddy 4.13 m/s    AV mn grad 41 mmHg    LVOT diam 2.10 cm    MV E/A ratio 1.14     Tricuspid annular plane systolic excursion 2.7 cm    LV EF 68 %    RV free wall pk S' 14.80 cm/s    LVIDd 4.30 cm    RVSP 25.5 mmHg    Aortic Valve Area by Continuity of VTI 0.65 cm2    Aortic Valve Area by Continuity of Peak Velocity 0.65 cm2    AV pk grad 68 mmHg    LV A4C EF 67.9    Electrocardiogram, 12-lead PRN ACS symptoms   Result Value Ref Range    Ventricular Rate 84 BPM    Atrial Rate 84 BPM    NH Interval 212 ms    QRS Duration 124 ms    QT Interval 392 ms    QTC Calculation(Bazett) 463 ms    P Axis 85 degrees    R Axis -82 degrees    T Axis 79 degrees    QRS Count 14 beats    Q Onset 212 ms    P Onset 106 ms    P Offset 165 ms    T Offset 408 ms    QTC Fredericia 438 ms   Electrocardiogram, 12-lead PRN ACS symptoms   Result Value Ref Range    Ventricular Rate 76 BPM    Atrial Rate 74 BPM    NH Interval 249 ms    QRS Duration 130 ms    QT Interval 401 ms    QTC  Calculation(Bazett) 451 ms    P Axis 20 degrees    R Axis -81 degrees    T Axis 82 degrees    QRS Count 14 beats    Q Onset 249 ms    T Offset 450 ms    QTC Fredericia 433 ms   CBC and Auto Differential   Result Value Ref Range    WBC 10.1 4.4 - 11.3 x10*3/uL    nRBC 0.0 0.0 - 0.0 /100 WBCs    RBC 2.89 (L) 4.50 - 5.90 x10*6/uL    Hemoglobin 9.3 (L) 13.5 - 17.5 g/dL    Hematocrit 28.0 (L) 41.0 - 52.0 %    MCV 97 80 - 100 fL    MCH 32.2 26.0 - 34.0 pg    MCHC 33.2 32.0 - 36.0 g/dL    RDW 12.5 11.5 - 14.5 %    Platelets 177 150 - 450 x10*3/uL    Neutrophils % 79.2 40.0 - 80.0 %    Immature Granulocytes %, Automated 0.7 0.0 - 0.9 %    Lymphocytes % 7.9 13.0 - 44.0 %    Monocytes % 11.8 2.0 - 10.0 %    Eosinophils % 0.0 0.0 - 6.0 %    Basophils % 0.4 0.0 - 2.0 %    Neutrophils Absolute 8.02 (H) 1.60 - 5.50 x10*3/uL    Immature Granulocytes Absolute, Automated 0.07 0.00 - 0.50 x10*3/uL    Lymphocytes Absolute 0.80 0.80 - 3.00 x10*3/uL    Monocytes Absolute 1.19 (H) 0.05 - 0.80 x10*3/uL    Eosinophils Absolute 0.00 0.00 - 0.40 x10*3/uL    Basophils Absolute 0.04 0.00 - 0.10 x10*3/uL   Comprehensive Metabolic Panel   Result Value Ref Range    Glucose 108 (H) 74 - 99 mg/dL    Sodium 138 136 - 145 mmol/L    Potassium 4.0 3.5 - 5.3 mmol/L    Chloride 105 98 - 107 mmol/L    Bicarbonate 27 21 - 32 mmol/L    Anion Gap 10 10 - 20 mmol/L    Urea Nitrogen 28 (H) 6 - 23 mg/dL    Creatinine 1.02 0.50 - 1.30 mg/dL    eGFR 72 >60 mL/min/1.73m*2    Calcium 8.1 (L) 8.6 - 10.3 mg/dL    Albumin 3.0 (L) 3.4 - 5.0 g/dL    Alkaline Phosphatase 55 33 - 136 U/L    Total Protein 5.4 (L) 6.4 - 8.2 g/dL    AST 16 9 - 39 U/L    Bilirubin, Total 0.8 0.0 - 1.2 mg/dL    ALT 11 10 - 52 U/L             Assessment/Plan                  Assessment & Plan  Rupture of right quadriceps tendon, initial encounter  1/Hx  of mechanical fall resulting in rupture of right quadriceps tendon and failed nonoperative treatments in the outpatient and underwent repair of  the rupture hamstrings quadriceps tendon/postop day #3.  As per Ortho care plan note patient has been advised weightbearing as tolerated with knee immobilizer in place.  Continue Mepilex dressing to the right knee and postop follow-up in 2 weeks with Ortho.  They have recommended DVT prophylaxis r 4 weeks.  Patient states that he would like to talk to Ortho team in a.m. and nurse advised to call them so they can either call the patient or talk to him.  Patient received physical therapy and his AM-PAC score is 16.  Patient lives alone at home.  Social service and discharge care planning .  Has seen the patient and discharged to SNF when bed available.  Discussed with nursing on the floor.  And discussed with  this a.m.  2./Moderate to severe aortic stenosis//soft blood pressures and remains asymptomatic and his antihypertensive medications are on hold.  Asymptomatic and 2D echocardiogram results noted.  Patient has been seen by cardiology.  He will continue to follow-up with his outpatient cardiologist Dr. Ascencion Miranda as recommended and has an upcoming appointment.  Patient plans on rescheduling it given his recent surgery.  2D echocardiogram done  noted and has normal LV function with EF of 65 to 70% and severe mitral annular calcification and mild mitral valve regurgitation and severe aortic valve stenosis and moderate aortic valve regurgitation.  3./Acute on chronic anemia/history of iron deficiency anemia.  Hemoglobin timproved to 9.3 and hematocrit is 28 yesterday..  4./History of hypertension and his blood pressure was soft on admission and will be monitored closely and his losartan is on hold.  Patient was started on metoprolol 25 mg p.o. daily by cardiology hold for systolic less than 110.  5./Dyslipidemia/patient to be resumed on his home dosage of atorvastatin.  6./History of chronic renal insufficiency and BPH and BUN/creatinine is stable at 38 and 1.07.  Encourage p.o. fluids.     7./Hypothyroidism and patient has been resumed on his home dosage of levothyroxine.  8/DVT prophylaxis patient remains on subcu heparin for DVT prophylaxis.  Continue PT OT  8/low-grade fever/patient has no productive cough or dysuria.  Will check UA with culture.  Check CBC with differential in AM.  Continue to monitor fever.  Incentive spirometry will be ordered.  Medications reconciled.  Total time spent 20 minutes.  Time spent on patient interaction counseling assessment and plan and documentation.                Brien Iyer MD

## 2025-02-14 NOTE — CARE PLAN
The clinical goals for the shift include maintain safety and comfort      Problem: Pain - Adult  Goal: Verbalizes/displays adequate comfort level or baseline comfort level  Outcome: Progressing     Problem: Safety - Adult  Goal: Free from fall injury  Outcome: Progressing     Problem: Discharge Planning  Goal: Discharge to home or other facility with appropriate resources  Outcome: Progressing     Problem: Chronic Conditions and Co-morbidities  Goal: Patient's chronic conditions and co-morbidity symptoms are monitored and maintained or improved  Outcome: Progressing     Problem: Nutrition  Goal: Nutrient intake appropriate for maintaining nutritional needs  Outcome: Progressing     Problem: Fall/Injury  Goal: Not fall by end of shift  Outcome: Progressing  Goal: Be free from injury by end of the shift  Outcome: Progressing  Goal: Verbalize understanding of personal risk factors for fall in the hospital  Outcome: Progressing  Goal: Verbalize understanding of risk factor reduction measures to prevent injury from fall in the home  Outcome: Progressing  Goal: Use assistive devices by end of the shift  Outcome: Progressing  Goal: Pace activities to prevent fatigue by end of the shift  Outcome: Progressing

## 2025-02-14 NOTE — DOCUMENTATION CLARIFICATION NOTE
"    PATIENT:               ALICIA MARSHALL  ACCT #:                  [id number]  MRN:                       [id number]  :                       1940  ADMIT DATE:       2025 8:57 AM  DISCH DATE:  RESPONDING PROVIDER #:        [id number]          PROVIDER RESPONSE TEXT:    Acute blood loss anemia superimposed on iron deficiency anemia and anemia due to chronic disease    CDI QUERY TEXT:    Clarification    Instruction:    Based on your assessment of the patient and the clinical information, please provide the requested documentation by clicking on the appropriate radio button and enter any additional information if prompted.    Question: Please further clarify the diagnosis of anemia as    When answering this query, please exercise your independent professional judgment. The fact that a question is being asked, does not imply that any particular answer is desired or expected.    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

## 2025-02-15 PROCEDURE — 2500000002 HC RX 250 W HCPCS SELF ADMINISTERED DRUGS (ALT 637 FOR MEDICARE OP, ALT 636 FOR OP/ED)

## 2025-02-15 PROCEDURE — 1200000002 HC GENERAL ROOM WITH TELEMETRY DAILY

## 2025-02-15 PROCEDURE — 97535 SELF CARE MNGMENT TRAINING: CPT | Mod: GP,CQ

## 2025-02-15 PROCEDURE — 2500000001 HC RX 250 WO HCPCS SELF ADMINISTERED DRUGS (ALT 637 FOR MEDICARE OP)

## 2025-02-15 PROCEDURE — 97116 GAIT TRAINING THERAPY: CPT | Mod: GP,CQ

## 2025-02-15 PROCEDURE — 2500000004 HC RX 250 GENERAL PHARMACY W/ HCPCS (ALT 636 FOR OP/ED)

## 2025-02-15 PROCEDURE — 2500000002 HC RX 250 W HCPCS SELF ADMINISTERED DRUGS (ALT 637 FOR MEDICARE OP, ALT 636 FOR OP/ED): Performed by: INTERNAL MEDICINE

## 2025-02-15 RX ADMIN — FERROUS SULFATE TAB 325 MG (65 MG ELEMENTAL FE) 325 MG: 325 (65 FE) TAB at 12:23

## 2025-02-15 RX ADMIN — HEPARIN SODIUM 5000 UNITS: 5000 INJECTION INTRAVENOUS; SUBCUTANEOUS at 02:49

## 2025-02-15 RX ADMIN — ACETAMINOPHEN 650 MG: 325 TABLET, FILM COATED ORAL at 00:33

## 2025-02-15 RX ADMIN — POLYETHYLENE GLYCOL 3350 17 G: 17 POWDER, FOR SOLUTION ORAL at 09:43

## 2025-02-15 RX ADMIN — LEVOTHYROXINE SODIUM 88 MCG: 88 TABLET ORAL at 06:36

## 2025-02-15 RX ADMIN — TAMSULOSIN HYDROCHLORIDE 0.4 MG: 0.4 CAPSULE ORAL at 09:43

## 2025-02-15 RX ADMIN — ATORVASTATIN CALCIUM 10 MG: 10 TABLET, FILM COATED ORAL at 20:30

## 2025-02-15 RX ADMIN — HEPARIN SODIUM 5000 UNITS: 5000 INJECTION INTRAVENOUS; SUBCUTANEOUS at 18:24

## 2025-02-15 RX ADMIN — HEPARIN SODIUM 5000 UNITS: 5000 INJECTION INTRAVENOUS; SUBCUTANEOUS at 09:43

## 2025-02-15 ASSESSMENT — COGNITIVE AND FUNCTIONAL STATUS - GENERAL
WALKING IN HOSPITAL ROOM: A LITTLE
TURNING FROM BACK TO SIDE WHILE IN FLAT BAD: A LITTLE
PERSONAL GROOMING: A LITTLE
HELP NEEDED FOR BATHING: A LITTLE
MOVING TO AND FROM BED TO CHAIR: A LITTLE
DRESSING REGULAR UPPER BODY CLOTHING: A LITTLE
DAILY ACTIVITIY SCORE: 18
DRESSING REGULAR LOWER BODY CLOTHING: A LITTLE
STANDING UP FROM CHAIR USING ARMS: A LITTLE
TURNING FROM BACK TO SIDE WHILE IN FLAT BAD: A LITTLE
STANDING UP FROM CHAIR USING ARMS: A LITTLE
WALKING IN HOSPITAL ROOM: A LOT
WALKING IN HOSPITAL ROOM: A LOT
DRESSING REGULAR UPPER BODY CLOTHING: A LITTLE
DRESSING REGULAR LOWER BODY CLOTHING: A LITTLE
TURNING FROM BACK TO SIDE WHILE IN FLAT BAD: A LITTLE
TOILETING: A LITTLE
MOVING TO AND FROM BED TO CHAIR: A LITTLE
MOBILITY SCORE: 16
MOVING FROM LYING ON BACK TO SITTING ON SIDE OF FLAT BED WITH BEDRAILS: A LITTLE
MOBILITY SCORE: 16
CLIMB 3 TO 5 STEPS WITH RAILING: TOTAL
TOILETING: A LITTLE
MOVING TO AND FROM BED TO CHAIR: A LITTLE
MOVING FROM LYING ON BACK TO SITTING ON SIDE OF FLAT BED WITH BEDRAILS: A LITTLE
HELP NEEDED FOR BATHING: A LITTLE
CLIMB 3 TO 5 STEPS WITH RAILING: A LOT
PERSONAL GROOMING: A LITTLE
EATING MEALS: A LITTLE
MOVING FROM LYING ON BACK TO SITTING ON SIDE OF FLAT BED WITH BEDRAILS: A LITTLE
MOBILITY SCORE: 16
DAILY ACTIVITIY SCORE: 19
STANDING UP FROM CHAIR USING ARMS: A LITTLE
CLIMB 3 TO 5 STEPS WITH RAILING: A LOT

## 2025-02-15 ASSESSMENT — PAIN SCALES - GENERAL
PAINLEVEL_OUTOF10: 0 - NO PAIN

## 2025-02-15 ASSESSMENT — PAIN - FUNCTIONAL ASSESSMENT: PAIN_FUNCTIONAL_ASSESSMENT: 0-10

## 2025-02-15 NOTE — ASSESSMENT & PLAN NOTE
1/Hx  of mechanical fall resulting in rupture of right quadriceps tendon and failed nonoperative treatments in the outpatient and underwent repair of the rupture hamstrings quadriceps tendon/postop day #4.  As per Ortho care plan note patient has been advised weightbearing as tolerated with knee immobilizer in place.  Continue PT OT.  Continue Mepilex dressing to the right knee and postop follow-up in 2 weeks with Ortho.  They have recommended DVT prophylaxis r 4 weeks.   Patient received physical therapy and his AM-PAC score [id number].  Patient lives alone at home.  Social service and discharge care planning .  Has seen the patient and discharged to SNF when bed available.  Patient has given his choices of SNF and ECF.  Discharge to SNF when bed available.  2./Moderate to severe aortic stenosis//soft blood pressures and remains asymptomatic and his antihypertensive medications are on hold.  Asymptomatic and 2D echocardiogram results noted.  Patient has been seen by cardiology.  He will continue to follow-up with his outpatient cardiologist Dr. Ascencion Miranda as recommended and has an upcoming appointment.  Patient plans on rescheduling it given his recent surgery.  2D echocardiogram done  noted and has normal LV function with EF of 65 to 70% and severe mitral annular calcification and mild mitral valve regurgitation and severe aortic valve stenosis and moderate aortic valve regurgitation.  3./Acute on chronic anemia/history of iron deficiency anemia.  Hemoglobin timproved to 9.3 and hematocrit is 28 yesterday..  4./History of hypertension and his blood pressure was soft on admission and will be monitored closely and his losartan is on hold.  Patient was started on metoprolol 25 mg p.o. daily by cardiology hold for systolic less than 110.  Blood pressure today is stable at 96 x 55 and patient is asymptomatic.  5./Dyslipidemia/patient to be resumed on his home dosage of atorvastatin.  6./History of chronic renal  insufficiency and BPH and BUN/creatinine is stable at 32 and 1.0 and potassium is 4.2.  Encourage p.o. fluids.    7./Hypothyroidism and patient has been resumed on his home dosage of levothyroxine.  8/DVT prophylaxis patient remains on subcu heparin for DVT prophylaxis.  Continue PT OT  8/low-grade fever/improved today and repeat UA is negative.  Patient has no overt signs of infection.  WBC count is normal.  Patient has no productive cough or dysuria.  Will check UA with culture.    Continue to monitor fever.  Incentive spirometry will be ordered.  Medications reconciled.  Total time spent 20 minutes.  Time spent on patient interaction counseling assessment and plan and documentation.

## 2025-02-15 NOTE — PROGRESS NOTES
Israel Franks is a 85 y.o. male on day 6 of admission presenting with Rupture of right quadriceps tendon, initial encounter.      Subjective   This is a duplicate note.  Please refer to the other note dictated from today.       Objective     Last Recorded Vitals  BP 93/55 (BP Location: Left arm, Patient Position: Lying)   Pulse 81   Temp 36.7 °C (98.1 °F) (Temporal)   Resp 18   Wt 54.4 kg (119 lb 14.9 oz)   SpO2 96%   Intake/Output last 3 Shifts:    Intake/Output Summary (Last 24 hours) at 2/15/2025 1658  Last data filed at 2/15/2025 0624  Gross per 24 hour   Intake --   Output 600 ml   Net -600 ml       Admission Weight  Weight: 54.4 kg (120 lb) (02/09/25 0850)    Daily Weight  02/11/25 : 54.4 kg (119 lb 14.9 oz)    Image Results      Physical Exam    Relevant Results               Assessment/Plan                  Assessment & Plan  Rupture of right quadriceps tendon, initial encounter  1/Hx  of mechanical fall resulting in rupture of right quadriceps tendon and failed nonoperative treatments in the outpatient and underwent repair of the rupture hamstrings quadriceps tendon/postop day #4.  As per Ortho care plan note patient has been advised weightbearing as tolerated with knee immobilizer in place.  Continue PT OT.  Continue Mepilex dressing to the right knee and postop follow-up in 2 weeks with Ortho.  They have recommended DVT prophylaxis r 4 weeks.   Patient received physical therapy and his AM-PAC score [id number].  Patient lives alone at home.  Social service and discharge care planning .  Has seen the patient and discharged to SNF when bed available.  Patient has given his choices of SNF and ECF.  Discharge to SNF when bed available.  2./Moderate to severe aortic stenosis//soft blood pressures and remains asymptomatic and his antihypertensive medications are on hold.  Asymptomatic and 2D echocardiogram results noted.  Patient has been seen by cardiology.  He will continue to follow-up with his outpatient  cardiologist Dr. Ascencion Miranda as recommended and has an upcoming appointment.  Patient plans on rescheduling it given his recent surgery.  2D echocardiogram done  noted and has normal LV function with EF of 65 to 70% and severe mitral annular calcification and mild mitral valve regurgitation and severe aortic valve stenosis and moderate aortic valve regurgitation.  3./Acute on chronic anemia/history of iron deficiency anemia.  Hemoglobin timproved to 9.3 and hematocrit is 28 yesterday..  4./History of hypertension and his blood pressure was soft on admission and will be monitored closely and his losartan is on hold.  Patient was started on metoprolol 25 mg p.o. daily by cardiology hold for systolic less than 110.  Blood pressure today is stable at 96 x 55 and patient is asymptomatic.  5./Dyslipidemia/patient to be resumed on his home dosage of atorvastatin.  6./History of chronic renal insufficiency and BPH and BUN/creatinine is stable at 32 and 1.0 and potassium is 4.2.  Encourage p.o. fluids.    7./Hypothyroidism and patient has been resumed on his home dosage of levothyroxine.  8/DVT prophylaxis patient remains on subcu heparin for DVT prophylaxis.  Continue PT OT  8/low-grade fever/improved today and repeat UA is negative.  Patient has no overt signs of infection.  WBC count is normal.  Patient has no productive cough or dysuria.  Will check UA with culture.    Continue to monitor fever.  Incentive spirometry will be ordered.  Medications reconciled.  Total time spent 20 minutes.  Time spent on patient interaction counseling assessment and plan and documentation.                Brien Iyer MD

## 2025-02-15 NOTE — PROGRESS NOTES
Israel Franks is a 85 y.o. male on day 5 of admission presenting with Rupture of right quadriceps tendon, initial encounter.      Subjective   Patient seen and examined by me.  Patient is resting in bed and denies any acute complaints.  Blood pressure is stable at 96/55.  Heart rate is 76.  Temp is 37.4.  UA was negative.  Patient has no cough.  WBC count is normal at 8.3.  Vitals and labs noted.  Patient is awaiting discharge to SNF when accepted.  His p.o. intake is good.       Objective     Last Recorded Vitals  BP 96/55 (BP Location: Right arm, Patient Position: Lying)   Pulse 76   Temp 37.4 °C (99.3 °F) (Temporal)   Resp 18   Wt 54.4 kg (119 lb 14.9 oz)   SpO2 96%   Intake/Output last 3 Shifts:    Intake/Output Summary (Last 24 hours) at 2/14/2025 2014  Last data filed at 2/14/2025 1841  Gross per 24 hour   Intake --   Output 1000 ml   Net -1000 ml       Admission Weight  Weight: 54.4 kg (120 lb) (02/09/25 0850)    Daily Weight  02/11/25 : 54.4 kg (119 lb 14.9 oz)    Image Results      Physical Exam  Vitals and nursing note reviewed.   Constitutional:       General: He is not in acute distress.     Appearance: Normal appearance. He is normal weight. He is not ill-appearing or toxic-appearing.   HENT:      Head: Normocephalic and atraumatic.      Right Ear: Tympanic membrane and ear canal normal. There is no impacted cerumen.      Left Ear: Tympanic membrane, ear canal and external ear normal.      Nose: Nose normal. No congestion or rhinorrhea.      Mouth/Throat:      Pharynx: Oropharynx is clear. No oropharyngeal exudate or posterior oropharyngeal erythema.   Eyes:      General: No visual field deficit or scleral icterus.        Right eye: No discharge.         Left eye: No discharge.      Extraocular Movements: Extraocular movements intact.      Conjunctiva/sclera: Conjunctivae normal.      Pupils: Pupils are equal, round, and reactive to light.   Neck:      Vascular: No carotid bruit.   Cardiovascular:       Rate and Rhythm: Normal rate and regular rhythm.      Pulses: Normal pulses.      Heart sounds: Murmur heard.      Crescendo decrescendo systolic murmur is present.      No gallop.   Pulmonary:      Effort: Pulmonary effort is normal. No tachypnea, accessory muscle usage or respiratory distress.      Breath sounds: Normal breath sounds. No wheezing, rhonchi or rales.   Abdominal:      General: Abdomen is flat. Bowel sounds are normal. There is no distension.      Palpations: Abdomen is soft. There is no mass.      Tenderness: There is no abdominal tenderness. There is no right CVA tenderness, left CVA tenderness, guarding or rebound.      Hernia: No hernia is present.   Musculoskeletal:         General: No swelling or tenderness. Normal range of motion.      Cervical back: Normal range of motion and neck supple. No rigidity.      Right lower leg: No edema.      Left lower leg: No edema.      Comments: S/p repair of his ruptured quadriceps tendon and has a dressing and pain is improving   Lymphadenopathy:      Cervical: No cervical adenopathy.   Skin:     General: Skin is warm.      Coloration: Skin is not jaundiced.      Findings: No erythema or rash.   Neurological:      General: No focal deficit present.      Mental Status: He is alert and oriented to person, place, and time. Mental status is at baseline.      Cranial Nerves: Cranial nerves 2-12 are intact. No cranial nerve deficit or dysarthria.      Sensory: No sensory deficit.      Motor: No weakness, tremor or seizure activity.      Gait: Gait normal.      Deep Tendon Reflexes: Reflexes are normal and symmetric. Reflexes normal.      Comments: Patient is awake alert oriented x 3 and no focal deficits and speech is normal   Psychiatric:         Mood and Affect: Mood normal.         Thought Content: Thought content normal.         Judgment: Judgment normal.         Relevant Results    Current Facility-Administered Medications:     acetaminophen (Tylenol)  tablet 650 mg, 650 mg, oral, q4h PRN, 650 mg at 02/13/25 2014 **OR** acetaminophen (Tylenol) oral liquid 650 mg, 650 mg, oral, q4h PRN **OR** acetaminophen (Tylenol) suppository 650 mg, 650 mg, rectal, q4h PRN, TIFFANY Alfonso-C    atorvastatin (Lipitor) tablet 10 mg, 10 mg, oral, Nightly, Jhoana Nuñez, PA-C, 10 mg at 02/14/25 2026    ferrous sulfate (325 mg ferrous sulfate) tablet 325 mg, 325 mg, oral, Daily with lunch, Jhoana Nuñez PA-C, 325 mg at 02/14/25 1256    heparin (porcine) injection 5,000 Units, 5,000 Units, subcutaneous, q8h, Jhoana LIMA Joaquin PA-C, 5,000 Units at 02/14/25 1750    levothyroxine (Synthroid, Levoxyl) tablet 88 mcg, 88 mcg, oral, Daily, Brien Iyer MD, 88 mcg at 02/14/25 0629    [Transfer Hold] losartan (Cozaar) tablet 50 mg, 50 mg, oral, Daily, Brien Iyer MD    metoprolol succinate XL (Toprol-XL) 24 hr tablet 25 mg, 25 mg, oral, Daily, Jarrell Weber MD PhD, 25 mg at 02/14/25 0915    morphine injection 2 mg, 2 mg, intravenous, q4h PRN, Jhoana Nuñez PA-C, 2 mg at 02/11/25 0418    ondansetron (Zofran) tablet 4 mg, 4 mg, oral, q8h PRN **OR** ondansetron (Zofran) injection 4 mg, 4 mg, intravenous, q8h PRN, Jhoana Nuñez PA-C, 4 mg at 02/10/25 1415    perflutren lipid microspheres (Definity) injection 0.5-10 mL of dilution, 0.5-10 mL of dilution, intravenous, Once in imaging, Jarrell Weber MD PhD    polyethylene glycol (Glycolax, Miralax) packet 17 g, 17 g, oral, Daily, Jhoana Nuñez PA-C, 17 g at 02/14/25 0915    tamsulosin (Flomax) 24 hr capsule 0.4 mg, 0.4 mg, oral, Daily, Jhoana LIMA Nuñez PA-C, 0.4 mg at 02/14/25 0915   Results for orders placed or performed during the hospital encounter of 02/09/25 (from the past 24 hours)   Urinalysis with Reflex Culture and Microscopic   Result Value Ref Range    Color, Urine Yellow Light-Yellow, Yellow, Dark-Yellow    Appearance, Urine Clear Clear    Specific Gravity, Urine 1.023 1.005 - 1.035    pH, Urine 5.5 5.0, 5.5, 6.0, 6.5, 7.0, 7.5,  8.0    Protein, Urine 20 (TRACE) NEGATIVE, 10 (TRACE), 20 (TRACE) mg/dL    Glucose, Urine Normal Normal mg/dL    Blood, Urine NEGATIVE NEGATIVE mg/dL    Ketones, Urine NEGATIVE NEGATIVE mg/dL    Bilirubin, Urine NEGATIVE NEGATIVE mg/dL    Urobilinogen, Urine 2 (1+) (A) Normal mg/dL    Nitrite, Urine NEGATIVE NEGATIVE    Leukocyte Esterase, Urine NEGATIVE NEGATIVE   Urinalysis Microscopic   Result Value Ref Range    WBC, Urine 1-5 1-5, NONE /HPF    RBC, Urine NONE NONE, 1-2, 3-5 /HPF   CBC   Result Value Ref Range    WBC 8.3 4.4 - 11.3 x10*3/uL    nRBC 0.0 0.0 - 0.0 /100 WBCs    RBC 2.92 (L) 4.50 - 5.90 x10*6/uL    Hemoglobin 9.1 (L) 13.5 - 17.5 g/dL    Hematocrit 29.0 (L) 41.0 - 52.0 %    MCV 99 80 - 100 fL    MCH 31.2 26.0 - 34.0 pg    MCHC 31.4 (L) 32.0 - 36.0 g/dL    RDW 12.8 11.5 - 14.5 %    Platelets 192 150 - 450 x10*3/uL   Basic Metabolic Panel   Result Value Ref Range    Glucose 95 74 - 99 mg/dL    Sodium 140 136 - 145 mmol/L    Potassium 4.2 3.5 - 5.3 mmol/L    Chloride 106 98 - 107 mmol/L    Bicarbonate 29 21 - 32 mmol/L    Anion Gap 9 (L) 10 - 20 mmol/L    Urea Nitrogen 32 (H) 6 - 23 mg/dL    Creatinine 1.00 0.50 - 1.30 mg/dL    eGFR 73 >60 mL/min/1.73m*2    Calcium 7.9 (L) 8.6 - 10.3 mg/dL     *Note: Due to a large number of results and/or encounters for the requested time period, some results have not been displayed. A complete set of results can be found in Results Review.             Assessment/Plan                  Assessment & Plan  Rupture of right quadriceps tendon, initial encounter  1/Hx  of mechanical fall resulting in rupture of right quadriceps tendon and failed nonoperative treatments in the outpatient and underwent repair of the rupture hamstrings quadriceps tendon/postop day #4.  As per Ortho care plan note patient has been advised weightbearing as tolerated with knee immobilizer in place.  Continue PT OT.  Continue Mepilex dressing to the right knee and postop follow-up in 2 weeks with  Ortho.  They have recommended DVT prophylaxis r 4 weeks.   Patient received physical therapy and his AM-PAC score [id number].  Patient lives alone at home.  Social service and discharge care planning .  Has seen the patient and discharged to SNF when bed available.  Patient has given his choices of SNF and ECF.  Discharge to SNF when bed available.  2./Moderate to severe aortic stenosis//soft blood pressures and remains asymptomatic and his antihypertensive medications are on hold.  Asymptomatic and 2D echocardiogram results noted.  Patient has been seen by cardiology.  He will continue to follow-up with his outpatient cardiologist Dr. Ascencion Miranda as recommended and has an upcoming appointment.  Patient plans on rescheduling it given his recent surgery.  2D echocardiogram done  noted and has normal LV function with EF of 65 to 70% and severe mitral annular calcification and mild mitral valve regurgitation and severe aortic valve stenosis and moderate aortic valve regurgitation.  3./Acute on chronic anemia/history of iron deficiency anemia.  Hemoglobin timproved to 9.3 and hematocrit is 28 yesterday..  4./History of hypertension and his blood pressure was soft on admission and will be monitored closely and his losartan is on hold.  Patient was started on metoprolol 25 mg p.o. daily by cardiology hold for systolic less than 110.  Blood pressure today is stable at 96 x 55 and patient is asymptomatic.  5./Dyslipidemia/patient to be resumed on his home dosage of atorvastatin.  6./History of chronic renal insufficiency and BPH and BUN/creatinine is stable at 32 and 1.0 and potassium is 4.2.  Encourage p.o. fluids.    7./Hypothyroidism and patient has been resumed on his home dosage of levothyroxine.  8/DVT prophylaxis patient remains on subcu heparin for DVT prophylaxis.  Continue PT OT  8/low-grade fever/improved today and repeat UA is negative.  Patient has no overt signs of infection.  WBC count is normal.  Patient has  no productive cough or dysuria.  Will check UA with culture.    Continue to monitor fever.  Incentive spirometry will be ordered.  Medications reconciled.  Total time spent 20 minutes.  Time spent on patient interaction counseling assessment and plan and documentation.                Brien Iyer MD

## 2025-02-15 NOTE — PROGRESS NOTES
Israel Franks is a 85 y.o. male on day 6 of admission presenting with Rupture of right quadriceps tendon, initial encounter.      Subjective   Patient seen and examined by me.  Patient is resting in bed.  He had physical therapy today and pain is improving.  Blood pressure is stable in the mid 90s systolic which is his baseline.  Patient has severe aortic stenosis.  Patient is in negative fluid balance 950 cc.  He denies any acute complaints and is awaiting discharge to SNF when bed available.  P.o. intake is good.       Objective     Last Recorded Vitals  BP 93/55 (BP Location: Left arm, Patient Position: Lying)   Pulse 81   Temp 36.7 °C (98.1 °F) (Temporal)   Resp 18   Wt 54.4 kg (119 lb 14.9 oz)   SpO2 96%   Intake/Output last 3 Shifts:    Intake/Output Summary (Last 24 hours) at 2/15/2025 1803  Last data filed at 2/15/2025 1200  Gross per 24 hour   Intake --   Output 950 ml   Net -950 ml       Admission Weight  Weight: 54.4 kg (120 lb) (02/09/25 0850)    Daily Weight  02/11/25 : 54.4 kg (119 lb 14.9 oz)    Image Results      Physical Exam  Vitals and nursing note reviewed.   Constitutional:       General: He is not in acute distress.     Appearance: Normal appearance. He is normal weight. He is not ill-appearing or toxic-appearing.      Comments: Awake alert oriented x 3 and in no acute distress   HENT:      Head: Normocephalic and atraumatic.      Right Ear: Tympanic membrane and ear canal normal. There is no impacted cerumen.      Left Ear: Tympanic membrane, ear canal and external ear normal.      Nose: Nose normal. No congestion or rhinorrhea.      Mouth/Throat:      Pharynx: Oropharynx is clear. No oropharyngeal exudate or posterior oropharyngeal erythema.   Eyes:      General: No scleral icterus.        Right eye: No discharge.         Left eye: No discharge.      Extraocular Movements: Extraocular movements intact.      Conjunctiva/sclera: Conjunctivae normal.      Pupils: Pupils are equal, round, and  reactive to light.   Neck:      Vascular: No carotid bruit.   Cardiovascular:      Rate and Rhythm: Normal rate and regular rhythm.      Pulses: Normal pulses.      Heart sounds: Murmur heard.      No gallop.   Pulmonary:      Effort: Pulmonary effort is normal. No accessory muscle usage or respiratory distress.      Breath sounds: Normal breath sounds. No wheezing, rhonchi or rales.   Abdominal:      General: Abdomen is flat. Bowel sounds are normal. There is no distension.      Palpations: Abdomen is soft. There is no mass.      Tenderness: There is no abdominal tenderness. There is no right CVA tenderness, left CVA tenderness, guarding or rebound.      Hernia: No hernia is present.   Musculoskeletal:         General: No swelling or tenderness. Normal range of motion.      Cervical back: Normal range of motion and neck supple. No rigidity.      Left lower leg: No edema.   Lymphadenopathy:      Cervical: No cervical adenopathy.   Skin:     General: Skin is warm.      Coloration: Skin is not jaundiced.      Findings: No erythema or rash.   Neurological:      General: No focal deficit present.      Mental Status: He is alert and oriented to person, place, and time. Mental status is at baseline.      Sensory: No sensory deficit.      Motor: No weakness, tremor or seizure activity.      Gait: Gait normal.      Deep Tendon Reflexes: Reflexes normal.   Psychiatric:         Attention and Perception: Attention and perception normal.         Mood and Affect: Mood normal. Mood is not anxious.         Thought Content: Thought content normal. Thought content does not include suicidal ideation.         Cognition and Memory: Cognition and memory normal.         Judgment: Judgment normal.         Relevant Results    Current Facility-Administered Medications:     acetaminophen (Tylenol) tablet 650 mg, 650 mg, oral, q4h PRN, 650 mg at 02/15/25 0033 **OR** acetaminophen (Tylenol) oral liquid 650 mg, 650 mg, oral, q4h PRN **OR**  acetaminophen (Tylenol) suppository 650 mg, 650 mg, rectal, q4h PRN, Jhoana LIMA Joaquin PA-C    atorvastatin (Lipitor) tablet 10 mg, 10 mg, oral, Nightly, Jhoana LIMA Joaquin, PA-C, 10 mg at 02/14/25 2026    ferrous sulfate (325 mg ferrous sulfate) tablet 325 mg, 325 mg, oral, Daily with lunch, Jhoana LIMA Joaquin PA-C, 325 mg at 02/15/25 1223    heparin (porcine) injection 5,000 Units, 5,000 Units, subcutaneous, q8h, Jhoana LIMA Joaquin PA-C, 5,000 Units at 02/15/25 0943    levothyroxine (Synthroid, Levoxyl) tablet 88 mcg, 88 mcg, oral, Daily, Brien Iyer MD, 88 mcg at 02/15/25 0636    [Transfer Hold] losartan (Cozaar) tablet 50 mg, 50 mg, oral, Daily, Brien Iyer MD    metoprolol succinate XL (Toprol-XL) 24 hr tablet 25 mg, 25 mg, oral, Daily, Jarrell Weber MD PhD, 25 mg at 02/14/25 0915    morphine injection 2 mg, 2 mg, intravenous, q4h PRN, Jhoana LIMA Joaquin PA-C, 2 mg at 02/11/25 0418    ondansetron (Zofran) tablet 4 mg, 4 mg, oral, q8h PRN **OR** ondansetron (Zofran) injection 4 mg, 4 mg, intravenous, q8h PRN, Jhoana LIMA Joaquin PA-C, 4 mg at 02/10/25 1415    perflutren lipid microspheres (Definity) injection 0.5-10 mL of dilution, 0.5-10 mL of dilution, intravenous, Once in imaging, Jarrell Weber MD PhD    polyethylene glycol (Glycolax, Miralax) packet 17 g, 17 g, oral, Daily, Jhoana Nuñez PA-C, 17 g at 02/15/25 0943    tamsulosin (Flomax) 24 hr capsule 0.4 mg, 0.4 mg, oral, Daily, Jhoana Nuñez PA-C, 0.4 mg at 02/15/25 0943   No results found. However, due to the size of the patient record, not all encounters were searched. Please check Results Review for a complete set of results.          Assessment/Plan      1/S/p mechanical fall resulting in rupture of right quadriceps tendon/underwent repair by Ortho team and postop day #5.  Patient is receiving physical therapy.  He remains on subcu heparin for DVT prophylaxis needed for 4 weeks.  Pain is controlled.  Discharge to ECF when bed available.    #2/Moderate to severe  aortic stenosis//soft blood pressures/which is his baseline.  Patient had a 2D echocardiogram done prior to his surgery and results noted.  He will continue to follow-up with Dr. Ascencion Miranda as his cardiologist in the outpatient after discharge.  #3/acute on chronic anemia/anemia of acute blood loss/hemoglobin is stable at 9.1 and hematocrit 29.  Patient also has history of iron deficiency anemia and remains on p.o. Feosol.  4/History of hypertension/his antihypertensive medications losartan have been discontinued due to soft blood pressures.  Dyslipidemia patient remains on his statin treatment.  5/hypothyroidism patient has been resumed on his levothyroxine home dosage.  Medications reconciled.  Total time spent 20 minutes/time spent on patient interaction counseling assessment and plan and documentation.  Discharge to ECF when bed available hopefully tomorrow or Monday.  Assessment & Plan  Rupture of right quadriceps tendon, initial encounter    2D echocardiogram done  noted and has normal LV function with EF of 65 to 70% and severe mitral annular calcification and mild mitral valve regurgitation and severe aortic valve stenosis and moderate aortic valve regurgitatioION                  Brien Iyer MD

## 2025-02-15 NOTE — CARE PLAN
The patient's goals for the shift include  safety and comfort  Problem: Pain - Adult  Goal: Verbalizes/displays adequate comfort level or baseline comfort level  Outcome: Progressing     Problem: Safety - Adult  Goal: Free from fall injury  Outcome: Progressing     Problem: Discharge Planning  Goal: Discharge to home or other facility with appropriate resources  Outcome: Progressing     Problem: Chronic Conditions and Co-morbidities  Goal: Patient's chronic conditions and co-morbidity symptoms are monitored and maintained or improved  Outcome: Progressing     Problem: Nutrition  Goal: Nutrient intake appropriate for maintaining nutritional needs  Outcome: Progressing     Problem: Fall/Injury  Goal: Not fall by end of shift  Outcome: Progressing  Goal: Be free from injury by end of the shift  Outcome: Progressing  Goal: Verbalize understanding of personal risk factors for fall in the hospital  Outcome: Progressing  Goal: Verbalize understanding of risk factor reduction measures to prevent injury from fall in the home  Outcome: Progressing  Goal: Use assistive devices by end of the shift  Outcome: Progressing  Goal: Pace activities to prevent fatigue by end of the shift  Outcome: Progressing       The clinical goals for the shift include Pt will remain safe and comfortable during the shift

## 2025-02-15 NOTE — ASSESSMENT & PLAN NOTE
2D echocardiogram done  noted and has normal LV function with EF of 65 to 70% and severe mitral annular calcification and mild mitral valve regurgitation and severe aortic valve stenosis and moderate aortic valve regurgitatioION

## 2025-02-15 NOTE — PROGRESS NOTES
Physical Therapy    Physical Therapy Treatment    Patient Name: Israel Franks  MRN: [id number]  Department: [facility]  Room: [address] [place] [address]  Today's Date: 2/15/2025  Time Calculation  Start Time: 0135  Stop Time: 0210  Time Calculation (min): 35 min         Assessment/Plan   PT Assessment  End of Session Patient Position: Bed, 2 rail up, Alarm on (call light in reach)     PT Plan  Treatment/Interventions: Bed mobility, Transfer training, Gait training, Balance training, Strengthening, Endurance training, Range of motion, Therapeutic exercise, Therapeutic activity, Home exercise program, Positioning  PT Plan: Ongoing PT  PT Frequency: 4 times per week  PT Discharge Recommendations: High intensity level of continued care  PT - OK to Discharge: Yes      General Visit Information:   PT  Visit  PT Received On: 02/15/25  General  Prior to Session Communication: Bedside nurse  Patient Position Received: Bed, 2 rail up, Alarm off, not on at start of session  General Comment:  (Call light within reach post treatment)    Subjective                Objective   Pain:  Pain Assessment  0-10 (Numeric) Pain Score: 0 - No pain                  Treatments:  Bed Mobility  Bed Mobility:  (Patient performed supine > < sit with MIN A X1, Hand support on bed handrail, X1 trial.)    Ambulation/Gait Training  Ambulation/Gait Training Performed:  (Patient ambualtes with FWW X100 ft. X1 trial with gait belt application, MIN. A and a step to gait pattern. Verbal cues for proper advancement of WW)  Transfers  Transfer:  (Patient performed Sit > < stand X1 trial, MIN /SBA, FWW, cues for proper hand/foot placement prior to standing.)    Outcome Measures:  [facility] Basic Mobility  Turning from your back to your side while in a flat bed without using bedrails: A little  Moving from lying on your back to sitting on the side of a flat bed without using bedrails: A little  Moving to and from bed to chair (including a wheelchair): A little  Standing up from a chair  using your arms (e.g. wheelchair or bedside chair): A little  To walk in hospital room: A little  Climbing 3-5 steps with railing: Total  Basic Mobility - Total Score: 16    Education Documentation  Handouts, taught by Anjelica Morris PTA at 2/15/2025  2:17 PM.  Learner: Patient  Readiness: Acceptance  Method: Explanation  Response: Verbalizes Understanding    Precautions, taught by Anjelica Morris PTA at 2/15/2025  2:17 PM.  Learner: Patient  Readiness: Acceptance  Method: Explanation  Response: Verbalizes Understanding    Body Mechanics, taught by Anjelica Morris PTA at 2/15/2025  2:17 PM.  Learner: Patient  Readiness: Acceptance  Method: Explanation  Response: Verbalizes Understanding    Home Exercise Program, taught by Anjelica Morris PTA at 2/15/2025  2:17 PM.  Learner: Patient  Readiness: Acceptance  Method: Explanation  Response: Verbalizes Understanding    Mobility Training, taught by Anjelica Morris PTA at 2/15/2025  2:17 PM.  Learner: Patient  Readiness: Acceptance  Method: Explanation  Response: Verbalizes Understanding    Body Mechanics, taught by Anjelica Morris PTA at 2/15/2025  2:17 PM.  Learner: Patient  Readiness: Acceptance  Method: Explanation  Response: Verbalizes Understanding    Precautions, taught by Anjelica Morris PTA at 2/15/2025  2:17 PM.  Learner: Patient  Readiness: Acceptance  Method: Explanation  Response: Verbalizes Understanding    ADL Training, taught by Anjelica Morris PTA at 2/15/2025  2:17 PM.  Learner: Patient  Readiness: Acceptance  Method: Explanation  Response: Verbalizes Understanding    Education Comments  No comments found.             Encounter Problems       Encounter Problems (Active)       PT Problem       PT Goal 1 STG - Pt will transition supine <> sitting with MOD I  (Progressing)       Start:  02/12/25    Expected End:  02/26/25            PT Goal 2 STG - Pt will transfer STS with supervision  (Progressing)       Start:  02/12/25    Expected End:   02/26/25            PT Goal 3 STG - Pt will amb 75' using FWW with supervision  (Progressing)       Start:  02/12/25    Expected End:  02/26/25            PT Goal 4 STG - Pt will perform a B LE ther ex program of 2-3 sets of 10  (Progressing)       Start:  02/12/25    Expected End:  02/26/25               Pain - Adult

## 2025-02-15 NOTE — CARE PLAN
The patient's goals for the shift include      The clinical goals for the shift include safety and comfort        Problem: Pain - Adult  Goal: Verbalizes/displays adequate comfort level or baseline comfort level  Outcome: Progressing     Problem: Safety - Adult  Goal: Free from fall injury  Outcome: Progressing     Problem: Discharge Planning  Goal: Discharge to home or other facility with appropriate resources  Outcome: Progressing     Problem: Chronic Conditions and Co-morbidities  Goal: Patient's chronic conditions and co-morbidity symptoms are monitored and maintained or improved  Outcome: Progressing     Problem: Nutrition  Goal: Nutrient intake appropriate for maintaining nutritional needs  Outcome: Progressing     Problem: Fall/Injury  Goal: Not fall by end of shift  Outcome: Progressing  Goal: Be free from injury by end of the shift  Outcome: Progressing  Goal: Verbalize understanding of personal risk factors for fall in the hospital  Outcome: Progressing  Goal: Verbalize understanding of risk factor reduction measures to prevent injury from fall in the home  Outcome: Progressing  Goal: Use assistive devices by end of the shift  Outcome: Progressing  Goal: Pace activities to prevent fatigue by end of the shift  Outcome: Progressing

## 2025-02-16 VITALS
DIASTOLIC BLOOD PRESSURE: 53 MMHG | RESPIRATION RATE: 18 BRPM | OXYGEN SATURATION: 96 % | TEMPERATURE: 98.2 F | BODY MASS INDEX: 16.79 KG/M2 | HEART RATE: 91 BPM | HEIGHT: 71 IN | SYSTOLIC BLOOD PRESSURE: 94 MMHG | WEIGHT: 119.93 LBS

## 2025-02-16 PROBLEM — S76.111A RUPTURE OF RIGHT QUADRICEPS TENDON, INITIAL ENCOUNTER: Status: RESOLVED | Noted: 2025-02-09 | Resolved: 2025-02-16

## 2025-02-16 PROCEDURE — 2500000001 HC RX 250 WO HCPCS SELF ADMINISTERED DRUGS (ALT 637 FOR MEDICARE OP)

## 2025-02-16 PROCEDURE — 1200000002 HC GENERAL ROOM WITH TELEMETRY DAILY

## 2025-02-16 PROCEDURE — 2500000002 HC RX 250 W HCPCS SELF ADMINISTERED DRUGS (ALT 637 FOR MEDICARE OP, ALT 636 FOR OP/ED): Performed by: INTERNAL MEDICINE

## 2025-02-16 PROCEDURE — 2500000004 HC RX 250 GENERAL PHARMACY W/ HCPCS (ALT 636 FOR OP/ED)

## 2025-02-16 PROCEDURE — 2500000002 HC RX 250 W HCPCS SELF ADMINISTERED DRUGS (ALT 637 FOR MEDICARE OP, ALT 636 FOR OP/ED)

## 2025-02-16 RX ORDER — POLYETHYLENE GLYCOL 3350 17 G/17G
17 POWDER, FOR SOLUTION ORAL DAILY
Start: 2025-02-17

## 2025-02-16 RX ORDER — ATORVASTATIN CALCIUM 10 MG/1
10 TABLET, FILM COATED ORAL NIGHTLY
Start: 2025-02-16

## 2025-02-16 RX ORDER — HEPARIN SODIUM 5000 [USP'U]/ML
5000 INJECTION, SOLUTION INTRAVENOUS; SUBCUTANEOUS EVERY 8 HOURS
Start: 2025-02-17 | End: 2025-03-09

## 2025-02-16 RX ORDER — ACETAMINOPHEN 325 MG/1
650 TABLET ORAL EVERY 4 HOURS PRN
Start: 2025-02-16

## 2025-02-16 RX ORDER — METOPROLOL SUCCINATE 25 MG/1
25 TABLET, EXTENDED RELEASE ORAL DAILY
Start: 2025-02-17

## 2025-02-16 RX ADMIN — HEPARIN SODIUM 5000 UNITS: 5000 INJECTION INTRAVENOUS; SUBCUTANEOUS at 17:50

## 2025-02-16 RX ADMIN — ATORVASTATIN CALCIUM 10 MG: 10 TABLET, FILM COATED ORAL at 20:00

## 2025-02-16 RX ADMIN — HEPARIN SODIUM 5000 UNITS: 5000 INJECTION INTRAVENOUS; SUBCUTANEOUS at 01:56

## 2025-02-16 RX ADMIN — TAMSULOSIN HYDROCHLORIDE 0.4 MG: 0.4 CAPSULE ORAL at 09:44

## 2025-02-16 RX ADMIN — HEPARIN SODIUM 5000 UNITS: 5000 INJECTION INTRAVENOUS; SUBCUTANEOUS at 09:44

## 2025-02-16 RX ADMIN — LEVOTHYROXINE SODIUM 88 MCG: 88 TABLET ORAL at 05:58

## 2025-02-16 RX ADMIN — FERROUS SULFATE TAB 325 MG (65 MG ELEMENTAL FE) 325 MG: 325 (65 FE) TAB at 13:05

## 2025-02-16 ASSESSMENT — COGNITIVE AND FUNCTIONAL STATUS - GENERAL
MOBILITY SCORE: 17
TURNING FROM BACK TO SIDE WHILE IN FLAT BAD: A LITTLE
DAILY ACTIVITIY SCORE: 19
MOVING TO AND FROM BED TO CHAIR: A LITTLE
CLIMB 3 TO 5 STEPS WITH RAILING: A LOT
DRESSING REGULAR LOWER BODY CLOTHING: A LITTLE
MOBILITY SCORE: 16
PERSONAL GROOMING: A LITTLE
TURNING FROM BACK TO SIDE WHILE IN FLAT BAD: A LITTLE
WALKING IN HOSPITAL ROOM: A LITTLE
DRESSING REGULAR UPPER BODY CLOTHING: A LITTLE
STANDING UP FROM CHAIR USING ARMS: A LITTLE
STANDING UP FROM CHAIR USING ARMS: A LITTLE
TOILETING: A LITTLE
HELP NEEDED FOR BATHING: A LITTLE
HELP NEEDED FOR BATHING: A LITTLE
DAILY ACTIVITIY SCORE: 19
MOVING FROM LYING ON BACK TO SITTING ON SIDE OF FLAT BED WITH BEDRAILS: A LITTLE
DRESSING REGULAR LOWER BODY CLOTHING: A LITTLE
TOILETING: A LITTLE
DRESSING REGULAR UPPER BODY CLOTHING: A LITTLE
WALKING IN HOSPITAL ROOM: A LOT
PERSONAL GROOMING: A LITTLE
CLIMB 3 TO 5 STEPS WITH RAILING: A LOT
MOVING FROM LYING ON BACK TO SITTING ON SIDE OF FLAT BED WITH BEDRAILS: A LITTLE
MOVING TO AND FROM BED TO CHAIR: A LITTLE

## 2025-02-16 ASSESSMENT — PAIN SCALES - GENERAL
PAINLEVEL_OUTOF10: 0 - NO PAIN

## 2025-02-16 ASSESSMENT — PAIN - FUNCTIONAL ASSESSMENT
PAIN_FUNCTIONAL_ASSESSMENT: 0-10
PAIN_FUNCTIONAL_ASSESSMENT: 0-10

## 2025-02-16 NOTE — DISCHARGE SUMMARY
Discharge Diagnosis  Rupture of right quadriceps tendon, initial encounter  1/HX  of mechanical fall 1/16/2024 resulting in right distal quadriceps tendon rupture/worsening pain and ambulatory dysfunction in the outpatient and was admitted for worsening symptoms and had orthopedic consultation and underwent surgical repair.  Postop patient is doing better and is receiving physical therapy and will be discharged to SNF for further physical therapy and rehab when bed available.  Pain has been improving and he is requiring only Tylenol as needed for pain control.  He will have a follow-up with orthopedic team in 1 week.  They have recommended weightbearing as tolerated and continue to use the boot.  2./Severe aortic stenosis and had a 2D echocardiogram done during this admission prior to his surgery/seen by cardiology/and has been advised to continue to follow-up with his regular cardiologist Dr. Ascencion Miranda in the outpatient as scheduled.  3./History of hypertension and had soft blood pressures during his admission and his losartan has been discontinued and metoprolol decreased to 25 mg p.o. daily/and to hold for systolic less than 120.  4./History of dyslipidemia continue his atorvastatin.  5/hypothyroidism continue his levothyroxine as per his home dosage.  6./History of BPH/remains asymptomatic  7./History of chronic renal insufficiency and renal functions are stable with BUN at 32 and creatinine 1.0 2/14/2025.  A BMP will be checked in AM.  8./Acute on chronic anemia/history of iron deficiency anemia and patient will be continued on his Feosol supplementation.  Hemoglobin is stable at 9.1 and hematocrit 29.0 and MCV is 99 2/14/2025 and a CBC with differential will be checked in AM.  9/gait instability postop and patient will require further PT OT and ongoing rehab and gait training in SNF and discharged to SNF in a.m. a.m.  Medications reconciled.  Total time spent 50 minutes/time spent on patient interaction  counseling assessment and plan and documentation and coordination of discharge planning.  Issues Requiring Follow-Up      Discharge Meds     Medication List      ASK your doctor about these medications     atorvastatin 10 mg tablet; Commonly known as: Lipitor; Take 1 tablet (10   mg) by mouth once daily.   CITRACAL REGULAR ORAL   denosumab 60 mg/mL syringe; Commonly known as: Prolia; Inject 1 mL (60   mg total) under the skin every 6 months.   Edarbi 40 mg tablet; Generic drug: azilsartan medoxomiL; TAKE 1 TABLET   BY MOUTH DAILY; Ask about: Which instructions should I use?   ferrous sulfate 325 (65 Fe) MG EC tablet   hydrocortisone-pramoxine 1-1 % rectal cream; Commonly known as:   Analpram-HC; Insert into the rectum 2 times a day.   ICaps AREDS 4,296 mcg-226 mg-90 mg capsule; Generic drug: vitamins   A,C,E-zinc-copper   levothyroxine 88 mcg tablet; Commonly known as: Synthroid, Levoxyl; TAKE   1 TABLET BY MOUTH ONCE  DAILY   metoprolol succinate XL 50 mg 24 hr tablet; Commonly known as:   Toprol-XL; TAKE 1 TABLET BY MOUTH DAILY   multivitamin with minerals tablet   tamsulosin 0.4 mg 24 hr capsule; Commonly known as: Flomax; TAKE 1   CAPSULE BY MOUTH ONCE DAILY.   UNABLE TO FIND       Test Results Pending At Discharge  Pending Labs       Order Current Status    Extra Urine Gray Tube Collected (02/09/25 2205)    Extra Urine Gray Tube Collected (02/14/25 0027)    Urinalysis with Reflex Culture and Microscopic In process    Urinalysis with Reflex Culture and Microscopic In process            Hospital Course   This is a pleasant 85 years old with history of moderate to severe aortic stenosis followed by Dr. Miranda in the outpatient/cardiologist at /history of hypertension/history of dyslipidemia, hypothyroidism, history of gastrojejunal ulcer and history of iron deficiency anemia and anemia secondary to chronic blood loss/history of chronic renal insufficiency and Guilbert's syndrome/history of BPH and history of  greater trochanteric bursitis who sustained a fall 1/16/2024 and twisted his knee and was following with the Ortho team as an outpatient.  Patient continued to have ongoing worsening pain and difficulty with ambulation.  He had an MRI done of his knee which showed a right high-grade near full-thickness tear of the distal quadriceps tendon.  Patient also had a moderate-sized suprapatellar effusion.  Patient was admitted to the hospital and orthopedic consultation was obtained.  Patient underwent surgical repair of his ruptured right quadriceps tender and received physical therapy.  Patient also was seen by cardiology team for cardiac clearance and had a 2D echocardiogram done.  He has been advised to continue to follow-up with his outpatient cardiologist Dr. Miranda for ongoing close monitoring of his severe aortic stenosis.  His blood pressure was soft and systolic runs in the mid 90s.  He was asymptomatic.  His antihypertensive medications were adjusted.  Patient was given physical therapy and recommended further PT OT and SNF and rehab placement.  He will be discharged to SNF tomorrow if bed available.  Discharge has been done and med rec reconciled.  Overall clinically patient is improving.  Patient will follow-up with his own PCP after discharge from the ECF in 1 to 2 weeks and follow-up with Ortho team in 1 week.  Ortho has recommended DVT prophylaxis and his subcu heparin will be continued at the [facility] after and if discharged from ECF prior to 4 weeks from his surgery he can be switched to p.o. aspirin.  Clinical condition is stable to be discharged in AM.    Pertinent Physical Exam At Time of Discharge  Physical Exam  Vitals and nursing note reviewed.   Constitutional:       General: He is awake. He is not in acute distress.     Appearance: Normal appearance. He is normal weight. He is not ill-appearing or toxic-appearing.      Comments: Patient is awake alert oriented x 3 and in no acute distress   HENT:       Head: Normocephalic and atraumatic.      Right Ear: Tympanic membrane and ear canal normal. There is no impacted cerumen.      Left Ear: Tympanic membrane, ear canal and external ear normal.      Nose: Nose normal. No congestion or rhinorrhea.      Mouth/Throat:      Pharynx: Oropharynx is clear. No oropharyngeal exudate or posterior oropharyngeal erythema.   Eyes:      General: No scleral icterus.        Right eye: No discharge.         Left eye: No discharge.      Extraocular Movements: Extraocular movements intact.      Conjunctiva/sclera: Conjunctivae normal.      Pupils: Pupils are equal, round, and reactive to light.   Neck:      Thyroid: No thyroid mass, thyromegaly or thyroid tenderness.      Vascular: No carotid bruit.      Trachea: Phonation normal.   Cardiovascular:      Rate and Rhythm: Normal rate and regular rhythm.      Pulses: Normal pulses.      Heart sounds: S1 normal and S2 normal. Murmur heard.      Crescendo decrescendo systolic murmur is present.      No gallop.      Comments: 3 x 6 crescendo-decrescendo murmur in the aortic area  Pulmonary:      Effort: Pulmonary effort is normal. No respiratory distress.      Breath sounds: Normal breath sounds. No wheezing, rhonchi or rales.   Abdominal:      General: Abdomen is flat. Bowel sounds are normal. There is no distension.      Palpations: Abdomen is soft. There is no mass.      Tenderness: There is no abdominal tenderness. There is no right CVA tenderness, left CVA tenderness, guarding or rebound.      Hernia: No hernia is present.   Musculoskeletal:         General: No swelling or tenderness. Normal range of motion.      Cervical back: Full passive range of motion without pain, normal range of motion and neck supple. No rigidity. No spinous process tenderness or muscular tenderness.      Left lower leg: No edema.      Comments: S/p right quadriceps repair for rupture   Lymphadenopathy:      Cervical: No cervical adenopathy.   Skin:     General:  Skin is warm.      Coloration: Skin is not jaundiced.      Findings: No erythema or rash.   Neurological:      General: No focal deficit present.      Mental Status: He is alert and oriented to person, place, and time. Mental status is at baseline.      Sensory: No sensory deficit.      Motor: No weakness.      Gait: Gait normal.      Deep Tendon Reflexes: Reflexes normal.   Psychiatric:         Attention and Perception: Attention normal.         Mood and Affect: Mood normal.         Speech: Speech normal.         Behavior: Behavior normal. Behavior is cooperative.         Thought Content: Thought content normal. Thought content does not include suicidal ideation.         Cognition and Memory: Cognition and memory normal.         Judgment: Judgment normal.         Outpatient Follow-Up  Future Appointments   Date Time Provider Department Center   2/25/2025 11:20 AM Torsten Teixeira MD MPH [id number] [facility]   3/31/2025  3:00 PM INF 01 ROSS [facility]   10/21/2025 12:50 PM Nia Markham MD [id number] [facility]         Brien Iyer MD

## 2025-02-16 NOTE — CARE PLAN
The patient's goals for the shift include      The clinical goals for the shift include Patient will remain safe and comfortable thoughout shift.

## 2025-02-17 VITALS
HEART RATE: 85 BPM | HEIGHT: 71 IN | TEMPERATURE: 99.1 F | OXYGEN SATURATION: 96 % | RESPIRATION RATE: 18 BRPM | BODY MASS INDEX: 16.79 KG/M2 | WEIGHT: 119.93 LBS | SYSTOLIC BLOOD PRESSURE: 112 MMHG | DIASTOLIC BLOOD PRESSURE: 61 MMHG

## 2025-02-17 LAB
ANION GAP SERPL CALC-SCNC: 16 MMOL/L (ref 10–20)
BASOPHILS # BLD AUTO: 0.05 X10*3/UL (ref 0–0.1)
BASOPHILS NFR BLD AUTO: 0.7 %
BUN SERPL-MCNC: 28 MG/DL (ref 6–23)
CALCIUM SERPL-MCNC: 7.8 MG/DL (ref 8.6–10.3)
CHLORIDE SERPL-SCNC: 105 MMOL/L (ref 98–107)
CO2 SERPL-SCNC: 22 MMOL/L (ref 21–32)
CREAT SERPL-MCNC: 0.88 MG/DL (ref 0.5–1.3)
EGFRCR SERPLBLD CKD-EPI 2021: 84 ML/MIN/1.73M*2
EOSINOPHIL # BLD AUTO: 0 X10*3/UL (ref 0–0.4)
EOSINOPHIL NFR BLD AUTO: 0 %
ERYTHROCYTE [DISTWIDTH] IN BLOOD BY AUTOMATED COUNT: 12.9 % (ref 11.5–14.5)
GLUCOSE SERPL-MCNC: 91 MG/DL (ref 74–99)
HCT VFR BLD AUTO: 29.8 % (ref 41–52)
HGB BLD-MCNC: 9.5 G/DL (ref 13.5–17.5)
IMM GRANULOCYTES # BLD AUTO: 0.04 X10*3/UL (ref 0–0.5)
IMM GRANULOCYTES NFR BLD AUTO: 0.5 % (ref 0–0.9)
LYMPHOCYTES # BLD AUTO: 1 X10*3/UL (ref 0.8–3)
LYMPHOCYTES NFR BLD AUTO: 13.5 %
MCH RBC QN AUTO: 31.6 PG (ref 26–34)
MCHC RBC AUTO-ENTMCNC: 31.9 G/DL (ref 32–36)
MCV RBC AUTO: 99 FL (ref 80–100)
MONOCYTES # BLD AUTO: 0.83 X10*3/UL (ref 0.05–0.8)
MONOCYTES NFR BLD AUTO: 11.2 %
NEUTROPHILS # BLD AUTO: 5.49 X10*3/UL (ref 1.6–5.5)
NEUTROPHILS NFR BLD AUTO: 74.1 %
NRBC BLD-RTO: 0 /100 WBCS (ref 0–0)
PLATELET # BLD AUTO: 233 X10*3/UL (ref 150–450)
POTASSIUM SERPL-SCNC: 4.3 MMOL/L (ref 3.5–5.3)
RBC # BLD AUTO: 3.01 X10*6/UL (ref 4.5–5.9)
SODIUM SERPL-SCNC: 139 MMOL/L (ref 136–145)
WBC # BLD AUTO: 7.4 X10*3/UL (ref 4.4–11.3)

## 2025-02-17 PROCEDURE — 2500000002 HC RX 250 W HCPCS SELF ADMINISTERED DRUGS (ALT 637 FOR MEDICARE OP, ALT 636 FOR OP/ED): Performed by: INTERNAL MEDICINE

## 2025-02-17 PROCEDURE — 2500000001 HC RX 250 WO HCPCS SELF ADMINISTERED DRUGS (ALT 637 FOR MEDICARE OP)

## 2025-02-17 PROCEDURE — 85025 COMPLETE CBC W/AUTO DIFF WBC: CPT | Performed by: INTERNAL MEDICINE

## 2025-02-17 PROCEDURE — 2500000002 HC RX 250 W HCPCS SELF ADMINISTERED DRUGS (ALT 637 FOR MEDICARE OP, ALT 636 FOR OP/ED)

## 2025-02-17 PROCEDURE — 2500000004 HC RX 250 GENERAL PHARMACY W/ HCPCS (ALT 636 FOR OP/ED)

## 2025-02-17 PROCEDURE — 36415 COLL VENOUS BLD VENIPUNCTURE: CPT | Performed by: INTERNAL MEDICINE

## 2025-02-17 PROCEDURE — 2500000001 HC RX 250 WO HCPCS SELF ADMINISTERED DRUGS (ALT 637 FOR MEDICARE OP): Performed by: STUDENT IN AN ORGANIZED HEALTH CARE EDUCATION/TRAINING PROGRAM

## 2025-02-17 PROCEDURE — 80048 BASIC METABOLIC PNL TOTAL CA: CPT | Performed by: INTERNAL MEDICINE

## 2025-02-17 RX ADMIN — METOPROLOL SUCCINATE 25 MG: 25 TABLET, EXTENDED RELEASE ORAL at 09:16

## 2025-02-17 RX ADMIN — LEVOTHYROXINE SODIUM 88 MCG: 88 TABLET ORAL at 05:39

## 2025-02-17 RX ADMIN — HEPARIN SODIUM 5000 UNITS: 5000 INJECTION INTRAVENOUS; SUBCUTANEOUS at 09:16

## 2025-02-17 RX ADMIN — HEPARIN SODIUM 5000 UNITS: 5000 INJECTION INTRAVENOUS; SUBCUTANEOUS at 01:37

## 2025-02-17 RX ADMIN — FERROUS SULFATE TAB 325 MG (65 MG ELEMENTAL FE) 325 MG: 325 (65 FE) TAB at 12:50

## 2025-02-17 RX ADMIN — POLYETHYLENE GLYCOL 3350 17 G: 17 POWDER, FOR SOLUTION ORAL at 09:16

## 2025-02-17 RX ADMIN — TAMSULOSIN HYDROCHLORIDE 0.4 MG: 0.4 CAPSULE ORAL at 09:16

## 2025-02-17 ASSESSMENT — COGNITIVE AND FUNCTIONAL STATUS - GENERAL
WALKING IN HOSPITAL ROOM: A LITTLE
MOBILITY SCORE: 18
DRESSING REGULAR LOWER BODY CLOTHING: A LITTLE
HELP NEEDED FOR BATHING: A LITTLE
DAILY ACTIVITIY SCORE: 20
MOVING TO AND FROM BED TO CHAIR: A LITTLE
TOILETING: A LITTLE
DRESSING REGULAR UPPER BODY CLOTHING: A LITTLE
TURNING FROM BACK TO SIDE WHILE IN FLAT BAD: A LITTLE
STANDING UP FROM CHAIR USING ARMS: A LITTLE
CLIMB 3 TO 5 STEPS WITH RAILING: A LOT

## 2025-02-17 ASSESSMENT — PAIN SCALES - GENERAL: PAINLEVEL_OUTOF10: 0 - NO PAIN

## 2025-02-17 ASSESSMENT — PAIN - FUNCTIONAL ASSESSMENT: PAIN_FUNCTIONAL_ASSESSMENT: 0-10

## 2025-02-17 NOTE — PROGRESS NOTES
02/17/25 1103   Discharge Planning   Home or Post Acute Services Post acute facilities (Rehab/SNF/etc)   Type of Post Acute Facility Services Skilled nursing   Expected Discharge Disposition SNF     Patient has discharge order can be transferred to [facility] today.  Auth received.  Transport set up for 1pm.  Will inform patient and bedside nurse updated with time and number for report.  Care transitions to follow.      1141am:  Patient updated on transfer to [facility].  Patient appreciative of information.  Care transitions to follow.

## 2025-02-17 NOTE — NURSING NOTE
An attempt was made to call in report to snf receiving pt this afternoon, was placed on hold for 20 minutes waiting for nurse. Another call was placed and a message was left w/  for receiving nurse, waiting for c/b @ this time

## 2025-02-17 NOTE — CARE PLAN
The patient's goals for the shift include      The clinical goals for the shift include comfort, safety      Problem: Pain - Adult  Goal: Verbalizes/displays adequate comfort level or baseline comfort level  Outcome: Progressing     Problem: Safety - Adult  Goal: Free from fall injury  Outcome: Progressing     Problem: Discharge Planning  Goal: Discharge to home or other facility with appropriate resources  Outcome: Progressing     Problem: Chronic Conditions and Co-morbidities  Goal: Patient's chronic conditions and co-morbidity symptoms are monitored and maintained or improved  Outcome: Progressing     Problem: Nutrition  Goal: Nutrient intake appropriate for maintaining nutritional needs  Outcome: Progressing     Problem: Fall/Injury  Goal: Not fall by end of shift  Outcome: Progressing  Goal: Be free from injury by end of the shift  Outcome: Progressing  Goal: Verbalize understanding of personal risk factors for fall in the hospital  Outcome: Progressing  Goal: Verbalize understanding of risk factor reduction measures to prevent injury from fall in the home  Outcome: Progressing  Goal: Use assistive devices by end of the shift  Outcome: Progressing  Goal: Pace activities to prevent fatigue by end of the shift  Outcome: Progressing

## 2025-02-17 NOTE — CARE PLAN
The patient's goals for the shift include      The clinical goals for the shift include Patient will remain comfortable, safe no fall throughout shift.

## 2025-02-25 ENCOUNTER — APPOINTMENT (OUTPATIENT)
Dept: ORTHOPEDIC SURGERY | Facility: CLINIC | Age: 85
End: 2025-02-25
Payer: COMMERCIAL

## 2025-02-26 ENCOUNTER — APPOINTMENT (OUTPATIENT)
Dept: ORTHOPEDIC SURGERY | Facility: CLINIC | Age: 85
End: 2025-02-26
Payer: COMMERCIAL

## 2025-02-26 ENCOUNTER — ANCILLARY PROCEDURE (OUTPATIENT)
Dept: ORTHOPEDIC SURGERY | Facility: CLINIC | Age: 85
End: 2025-02-26
Payer: COMMERCIAL

## 2025-02-26 DIAGNOSIS — Z98.890 S/P RIGHT KNEE SURGERY: ICD-10-CM

## 2025-02-26 DIAGNOSIS — Z98.890 STATUS POST SURGERY: ICD-10-CM

## 2025-02-26 DIAGNOSIS — S76.111A QUADRICEPS TENDON RUPTURE, RIGHT, INITIAL ENCOUNTER: ICD-10-CM

## 2025-02-26 PROCEDURE — 73560 X-RAY EXAM OF KNEE 1 OR 2: CPT | Mod: RIGHT SIDE | Performed by: STUDENT IN AN ORGANIZED HEALTH CARE EDUCATION/TRAINING PROGRAM

## 2025-02-26 PROCEDURE — 1111F DSCHRG MED/CURRENT MED MERGE: CPT | Performed by: STUDENT IN AN ORGANIZED HEALTH CARE EDUCATION/TRAINING PROGRAM

## 2025-02-26 PROCEDURE — 1123F ACP DISCUSS/DSCN MKR DOCD: CPT | Performed by: STUDENT IN AN ORGANIZED HEALTH CARE EDUCATION/TRAINING PROGRAM

## 2025-02-26 PROCEDURE — 99024 POSTOP FOLLOW-UP VISIT: CPT | Performed by: STUDENT IN AN ORGANIZED HEALTH CARE EDUCATION/TRAINING PROGRAM

## 2025-03-01 DIAGNOSIS — E03.9 ACQUIRED HYPOTHYROIDISM: ICD-10-CM

## 2025-03-02 DIAGNOSIS — I10 BENIGN ESSENTIAL HYPERTENSION: ICD-10-CM

## 2025-03-03 RX ORDER — LEVOTHYROXINE SODIUM 88 UG/1
88 TABLET ORAL DAILY
Qty: 90 TABLET | Refills: 0 | Status: SHIPPED | OUTPATIENT
Start: 2025-03-03

## 2025-03-06 ENCOUNTER — DOCUMENTATION (OUTPATIENT)
Dept: CARE COORDINATION | Facility: CLINIC | Age: 85
End: 2025-03-06
Payer: COMMERCIAL

## 2025-03-06 ENCOUNTER — PATIENT OUTREACH (OUTPATIENT)
Dept: CARE COORDINATION | Facility: CLINIC | Age: 85
End: 2025-03-06
Payer: COMMERCIAL

## 2025-03-06 SDOH — ECONOMIC STABILITY: GENERAL: WOULD YOU LIKE HELP WITH ANY OF THE FOLLOWING NEEDS?: I DONT NEED HELP WITH ANY OF THESE

## 2025-03-06 NOTE — PROGRESS NOTES
Outreach call to patient to support a smooth transition of care from recent discharge from [facility]. Spoke to patient, he states he is doing well, voiced understanding of discharge medications, instructions and follow up appointments. Patient states he is going to outpatient PT.  Will continue to monitor through transition period.

## 2025-03-09 NOTE — PROGRESS NOTES
PRIMARY CARE PHYSICIAN: Judah White MD    ORTHOPAEDIC POSTOPERATIVE VISIT    ASSESSMENT & PLAN    Impression: 85 y.o. male 2 weeks and 2 days postop s/p right quadriceps tendon repair.  Overall, the patient is recovering well.    Plan:   He will remain weightbearing as tolerated with knee brace locked in extension.  He will work on range of motion beginning with 0 to 30 degrees and increasing by 15 degrees/week.  Goal of 0 to 90 degrees x 6 weeks postoperatively.    I reviewed the intraoperative findings with the patient and answered all their questions. I reviewed their postoperative timeline and plan with them. They understand the postoperative precautions and the treatment plan going forward.     Follow-Up: Patient will follow-up in 4 to 5 weeks.    At the end of the visit, all questions were answered in full. The patient is in agreement with the plan and recommendations. They will call the office with any questions/concerns.    Note dictated with Ale[employer] software. Completed without full typed error editing and sent to avoid delay.    SUBJECTIVE  CHIEF COMPLAINT:   Chief Complaint   Patient presents with    Right Knee - Post-op     2wk POV No pain        HPI: Israel Franks is a 85 y.o. patient. Israel Franks is now 2 weeks and 2 days postop status post right distal quadriceps tendon repair.  Overall, the patient is recovering well.  His pain is controlled he is no longer narcotic medication.  No reports of issues with surgical incision.  Remains weightbearing as tolerated with knee brace in place.  He is at the start physical therapy.  No other complaints today.    REVIEW OF SYSTEMS  Constitutional: See HPI for pain assessment, No significant weight loss, recent trauma  Cardiovascular: No chest pain, shortness of breath  Respiratory: No difficulty breathing, cough  Gastrointestinal: No nausea, vomiting, diarrhea, constipation  Musculoskeletal: Noted in HPI, positive for pain, restricted  motion, stiffness  Integumentary: No rashes, easy bruising, redness   Neurological: no numbness or tingling in extremities, no gait disturbances   Psychiatric: No mood changes, memory changes, social issues  Heme/Lymph: no excessive swelling, easy bruising, excessive bleeding  ENT: No hearing changes  Eyes: No vision changes    CURRENT MEDICATIONS:   Current Outpatient Medications   Medication Sig Dispense Refill    acetaminophen (Tylenol) 325 mg tablet Take 2 tablets (650 mg) by mouth every 4 hours if needed for mild pain (1 - 3).      atorvastatin (Lipitor) 10 mg tablet Take 1 tablet (10 mg) by mouth once daily at bedtime.      calcium citrate/vitamin D3 (CITRACAL REGULAR ORAL) Take 3 tablets by mouth once daily at bedtime.      denosumab (Prolia) 60 mg/mL syringe Inject 1 mL (60 mg total) under the skin every 6 months. 1 mL 1    Edarbi 40 mg tablet TAKE 1 TABLET BY MOUTH DAILY 30 tablet 0    ferrous sulfate 325 (65 Fe) MG EC tablet Take 1 tablet by mouth once daily at noon. Take with meals.      heparin sodium,porcine (heparin, porcine,) 5,000 unit/mL injection Inject 1 mL (5,000 Units) under the skin every 8 hours for 20 days.      levothyroxine (Synthroid, Levoxyl) 88 mcg tablet TAKE 1 TABLET BY MOUTH ONCE  DAILY 90 tablet 0    metoprolol succinate XL (Toprol-XL) 25 mg 24 hr tablet Take 1 tablet (25 mg) by mouth once daily. Do not crush or chew.      multivitamin with minerals tablet Take 1 tablet by mouth once daily at noon. Take with meals.      polyethylene glycol (Glycolax, Miralax) 17 gram packet Take 17 g by mouth once daily.      tamsulosin (Flomax) 0.4 mg 24 hr capsule TAKE 1 CAPSULE BY MOUTH ONCE DAILY. 90 capsule 0    vitamins A,C,E-zinc-copper (ICaps AREDS) 4,296 mcg-226 mg-90 mg capsule Take 1 capsule by mouth 2 times a day.       No current facility-administered medications for this visit.        OBJECTIVE    PHYSICAL EXAM      2/16/2025     3:41 AM 2/16/2025     8:47 AM 2/16/2025     9:43 AM  2/16/2025     5:04 PM 2/16/2025     7:43 PM 2/17/2025     3:17 AM 2/17/2025     8:11 AM   Vitals   Systolic 123 112 103 101 94 109 112   Diastolic 57 59 56 57 53 58 61   BP Location Left arm Left arm Left arm  Left arm Left arm Left arm   Heart Rate 82 86  79 91 82 85   Temp 36.8 °C (98.2 °F) 36.9 °C (98.4 °F)  37 °C (98.6 °F) 36.8 °C (98.2 °F) 37.5 °C (99.5 °F) 37.3 °C (99.1 °F)   Resp 18 18  18 18 18 18      There is no height or weight on file to calculate BMI.    General: Well-appearing, no acute distress    Skin intact bilateral upper and lower extremities  No erythema  No warmth    Detailed examination of right knee demonstrates:  Surgical incisions healing well, Steri-Strips in place  No erythema or warmth  No drainage  No ecchymosis  Mild effusion  Resolving swelling, minimal  The patient is able to straight leg raise without extensor lag.  Patella mobility 1+ medial and lateral  Lower extremity motor grossly intact  L4-S1 sensation intact bilaterally  2+ DP/PT pulses bilaterally  Warm and well-perfused, brisk capillary refill    IMAGING:   AP and lateral views of the right knee without evidence of new injury or fracture.    Júnior Teixeira MD, MPH  Attending Surgeon    Sports Medicine Orthopaedic Surgery  [facility] [employer] [facility]

## 2025-03-13 RX ORDER — AZILSARTAN KAMEDOXOMIL 40 MG/1
1 TABLET ORAL DAILY
Qty: 30 TABLET | Refills: 11 | OUTPATIENT
Start: 2025-03-13

## 2025-03-20 ENCOUNTER — TELEPHONE (OUTPATIENT)
Dept: PRIMARY CARE | Facility: CLINIC | Age: 85
End: 2025-03-20
Payer: COMMERCIAL

## 2025-03-25 DIAGNOSIS — M81.8 DISUSE OSTEOPOROSIS: Primary | ICD-10-CM

## 2025-03-26 ENCOUNTER — LAB (OUTPATIENT)
Dept: LAB | Facility: HOSPITAL | Age: 85
End: 2025-03-26
Payer: COMMERCIAL

## 2025-03-26 DIAGNOSIS — M81.0 AGE-RELATED OSTEOPOROSIS WITHOUT CURRENT PATHOLOGICAL FRACTURE: Primary | ICD-10-CM

## 2025-03-26 LAB
ALBUMIN SERPL BCP-MCNC: 3.9 G/DL (ref 3.4–5)
ALP SERPL-CCNC: 74 U/L (ref 33–136)
ALT SERPL W P-5'-P-CCNC: 23 U/L (ref 10–52)
ANION GAP SERPL CALC-SCNC: 12 MMOL/L (ref 10–20)
AST SERPL W P-5'-P-CCNC: 25 U/L (ref 9–39)
BILIRUB SERPL-MCNC: 0.8 MG/DL (ref 0–1.2)
BUN SERPL-MCNC: 31 MG/DL (ref 6–23)
CALCIUM SERPL-MCNC: 9.8 MG/DL (ref 8.6–10.6)
CHLORIDE SERPL-SCNC: 105 MMOL/L (ref 98–107)
CO2 SERPL-SCNC: 27 MMOL/L (ref 21–32)
CREAT SERPL-MCNC: 1.07 MG/DL (ref 0.5–1.3)
EGFRCR SERPLBLD CKD-EPI 2021: 68 ML/MIN/1.73M*2
GLUCOSE SERPL-MCNC: 84 MG/DL (ref 74–99)
POTASSIUM SERPL-SCNC: 4.9 MMOL/L (ref 3.5–5.3)
PROT SERPL-MCNC: 6.5 G/DL (ref 6.4–8.2)
SODIUM SERPL-SCNC: 139 MMOL/L (ref 136–145)

## 2025-03-26 PROCEDURE — 80053 COMPREHEN METABOLIC PANEL: CPT

## 2025-03-27 DIAGNOSIS — S76.111D RUPTURE OF RIGHT QUADRICEPS TENDON, SUBSEQUENT ENCOUNTER: ICD-10-CM

## 2025-03-28 ENCOUNTER — OFFICE VISIT (OUTPATIENT)
Dept: ORTHOPEDIC SURGERY | Facility: HOSPITAL | Age: 85
End: 2025-03-28
Payer: COMMERCIAL

## 2025-03-28 ENCOUNTER — HOSPITAL ENCOUNTER (OUTPATIENT)
Dept: RADIOLOGY | Facility: HOSPITAL | Age: 85
Discharge: HOME | End: 2025-03-28
Payer: COMMERCIAL

## 2025-03-28 VITALS — BODY MASS INDEX: 16.66 KG/M2 | WEIGHT: 119 LBS | HEIGHT: 71 IN

## 2025-03-28 DIAGNOSIS — Z98.890 S/P RIGHT KNEE SURGERY: Primary | ICD-10-CM

## 2025-03-28 DIAGNOSIS — S76.111D RUPTURE OF RIGHT QUADRICEPS TENDON, SUBSEQUENT ENCOUNTER: ICD-10-CM

## 2025-03-28 DIAGNOSIS — S76.111A QUADRICEPS TENDON RUPTURE, RIGHT, INITIAL ENCOUNTER: ICD-10-CM

## 2025-03-28 PROCEDURE — 73560 X-RAY EXAM OF KNEE 1 OR 2: CPT | Mod: RT

## 2025-03-28 PROCEDURE — 1159F MED LIST DOCD IN RCRD: CPT | Performed by: STUDENT IN AN ORGANIZED HEALTH CARE EDUCATION/TRAINING PROGRAM

## 2025-03-28 PROCEDURE — 1123F ACP DISCUSS/DSCN MKR DOCD: CPT | Performed by: STUDENT IN AN ORGANIZED HEALTH CARE EDUCATION/TRAINING PROGRAM

## 2025-03-28 PROCEDURE — 99211 OFF/OP EST MAY X REQ PHY/QHP: CPT | Performed by: STUDENT IN AN ORGANIZED HEALTH CARE EDUCATION/TRAINING PROGRAM

## 2025-03-28 ASSESSMENT — PAIN - FUNCTIONAL ASSESSMENT: PAIN_FUNCTIONAL_ASSESSMENT: NO/DENIES PAIN

## 2025-03-31 ENCOUNTER — APPOINTMENT (OUTPATIENT)
Dept: INFUSION THERAPY | Facility: CLINIC | Age: 85
End: 2025-03-31
Payer: MEDICARE

## 2025-03-31 VITALS
DIASTOLIC BLOOD PRESSURE: 68 MMHG | SYSTOLIC BLOOD PRESSURE: 116 MMHG | RESPIRATION RATE: 16 BRPM | HEART RATE: 84 BPM | WEIGHT: 115.41 LBS | TEMPERATURE: 97.3 F | BODY MASS INDEX: 16.1 KG/M2 | OXYGEN SATURATION: 99 %

## 2025-03-31 DIAGNOSIS — M81.0 OSTEOPOROSIS, UNSPECIFIED OSTEOPOROSIS TYPE, UNSPECIFIED PATHOLOGICAL FRACTURE PRESENCE: ICD-10-CM

## 2025-03-31 PROCEDURE — 96372 THER/PROPH/DIAG INJ SC/IM: CPT | Performed by: NURSE PRACTITIONER

## 2025-03-31 RX ORDER — EPINEPHRINE 0.3 MG/.3ML
0.3 INJECTION SUBCUTANEOUS EVERY 5 MIN PRN
OUTPATIENT
Start: 2025-09-22

## 2025-03-31 RX ORDER — FAMOTIDINE 10 MG/ML
20 INJECTION, SOLUTION INTRAVENOUS ONCE AS NEEDED
OUTPATIENT
Start: 2025-09-22

## 2025-03-31 RX ORDER — DIPHENHYDRAMINE HYDROCHLORIDE 50 MG/ML
50 INJECTION, SOLUTION INTRAMUSCULAR; INTRAVENOUS AS NEEDED
OUTPATIENT
Start: 2025-09-22

## 2025-03-31 RX ORDER — ALBUTEROL SULFATE 0.83 MG/ML
3 SOLUTION RESPIRATORY (INHALATION) AS NEEDED
OUTPATIENT
Start: 2025-09-22

## 2025-03-31 ASSESSMENT — ENCOUNTER SYMPTOMS
DIZZINESS: 0
LEG SWELLING: 0
SHORTNESS OF BREATH: 0
WOUND: 0
WHEEZING: 0
PALPITATIONS: 0
NUMBNESS: 0
EXTREMITY WEAKNESS: 0
COUGH: 0
LIGHT-HEADEDNESS: 0

## 2025-03-31 NOTE — PROGRESS NOTES
[facility]   Infusion Clinic Note   Date: 2025   Name: Israel Franks  : 1940   MRN: [id number]         Reason for Visit: Injections and Follow-up (PT HERE FOR PROLIA 60 MG INJECTION/NEXT APPT: 6 MONTHS)         Today: We administered denosumab.       Ordered By: Gio Leroy MD       For a Diagnosis of: Osteoporosis, unspecified osteoporosis type, unspecified pathological fracture presence       At today's visit patient accompanied by: Self      Today's Vitals:   Vitals:    25 1411   BP: 116/68   Pulse: 84   Resp: 16   Temp: 36.3 °C (97.3 °F)   SpO2: 99%   Weight: 52.3 kg (115 lb 6.6 oz)             Pre - Treatment Checklist:      - Previous reaction to current treatment: no      (Assess patient for the concerns below. Document provider notification as appropriate).  - Active or recent infection with/without current antibiotic use: no  - Recent or planned invasive dental work: no  - Recent or planned surgeries: YES, SURGERY ON LEG 2/10  - Recently received or plans to receive vaccinations: no  - Has treatment related toxicities: no  - Any chance may be pregnant:  n/a      Pain: 0   - Is the pain different from normal: n/a   - Is prescribing Doctor aware:  n/a      Labs: Reviewed       Fall Risk Screening: Abad Fall Risk  History of Falling, Immediate or Within 3 Months: Yes  Secondary Diagnosis: Yes  Ambulatory Aid: Crutches/cane/walker  Intravenous Therapy/Heparin Lock: Yes  Gait/Transferring: Weak  Mental Status: Oriented to own ability  Abad Fall Risk Score: 85       Review Of Systems:  Review of Systems   Respiratory:  Negative for cough, shortness of breath and wheezing.    Cardiovascular:  Negative for chest pain, leg swelling and palpitations.   Skin:  Negative for itching, rash and wound.   Neurological:  Negative for dizziness, extremity weakness, light-headedness and numbness.         Infusion Readiness:  - Assessment Concerns Related to Infusion: No  -  "Provider notified: n/a      New Patient Education:    N/A (returning patient for continuation of therapy. Ongoing education provided as needed.)        Treatment Conditions & Drug Specific Questions:    Denosumab  (PROLIA. XGEVA)    (Unless otherwise specified on patient specific therapy plan):     TREATMENT CONDITIONS:  Unless otherwise specified on patient specific therapy plan HOLD and notify provider prior to proceeding with today's injection if patients:  O Corrected or Serum Calcium LESS THAN 8.6 mg/dL  OR Ionized calcium less than 1.1 mmol/L or  less than 4.7 mg/dL (depending on resulting agency)  o Recent or planned invasive dental procedure (within 4 weeks)    Lab Results   Component Value Date    CALCIUM 9.8 03/26/2025    PHOS 3.2 02/14/2019      No results found for: \"CAION\"    Patient meets treatment conditions? Yes    DRUG SPECIFIC QUESTIONS:  Is the patient taking calcium and vitamin D? Yes  (Recommended)    Pt Instructed on following risks: (1) hypocalcemia, (2) osteonecrosis of the jaw, (3) atypical femoral fractures, (4) serious infections, and (5) dermatologic reactions?  Yes      REMINDER:  PREGNANCY CATEGORY X DRUG. OBTAIN NEGTATIVE PREGNANCY TEST PRIOR TO FIRST INFUSION FOR WOMEN OF CHILDBEARING ABILITY   REMS DRUG    Recommended Vitals/Observation:  Vitals: Obtain vitals prior to injection.  Observation: Patient may leave immediately following injection.        Weight Based Drug Calculations:    WEIGHT BASED DRUGS: NOT APPLICABLE / FLAT DOSE       Post Treatment: Patient tolerated treatment without issue and was discharged in no apparent distress.      Note Authored / Patient Cared for By: Lakeisha Irvin RN        "

## 2025-03-31 NOTE — PATIENT INSTRUCTIONS
Today :We administered denosumab.     For:   1. Osteoporosis, unspecified osteoporosis type, unspecified pathological fracture presence         Your next appointment is due in:  6 MONTHS        Please read the  Medication Guide that was given to you and reviewed during todays visit.     (Tell all doctors including dentists that you are taking this medication)     Go to the emergency room or call 911 if:  -You have signs of allergic reaction:   -Rash, hives, itching.   -Swollen, blistered, peeling skin.   -Swelling of face, lips, mouth, tongue or throat.   -Tightness of chest, trouble breathing, swallowing or talking     Call your doctor:  - If IV / injection site gets red, warm, swollen, itchy or leaks fluid or pus.     (Leave dressing on your IV site for at least 2 hours and keep area clean and dry  - If you get sick or have symptoms of infection or are not feeling well for any reason.    (Wash your hands often, stay away from people who are sick)  - If you have side effects from your medication that do not go away or are bothersome.     (Refer to the teaching your nurse gave you for side effects to call your doctor about)    - Common side effects may include:  stuffy nose, headache, feeling tired, muscle aches, upset stomach  - Before receiving any vaccines     - Call the [facility] at   If:  - You get sick, are on antibiotics, have had a recent vaccine, have surgery or dental work and your doctor wants your visit rescheduled.  - You need to cancel and reschedule your visit for any reason. Call at least 2 days before your visit if you need to cancel.   - Your insurance changes before your next visit.    (We will need to get approval from your new insurance. This can take up to two weeks.)     The [facility] is opened Monday thru Friday. We are closed on weekends and holidays.   Voice mail will take your call if the center is closed. If you leave a message please allow 24 hours for  a call back during weekdays. If you leave a message on a weekend/holiday, we will call you back the next business day.    A pharmacist is available Monday - Friday from 8:30AM to 3:30PM to help answer any questions you may have about your prescriptions(s). Please call pharmacy at:    [facility] [place]: [phone]  [facility]: [phone]  [facility]: [phone]

## 2025-04-07 NOTE — PROGRESS NOTES
PRIMARY CARE PHYSICIAN: Judah White MD    ORTHOPAEDIC POSTOPERATIVE VISIT    ASSESSMENT & PLAN    Impression: 85 y.o. male 6 weeks with 4 days postop s/p right knee quadriceps tendon repair, overall doing well.    Plan:   At this time, he will begin to wean out of his brace.  He will continue to work on range of motion slight strengthening with physical therapy.    I reviewed the intraoperative findings with the patient and answered all their questions. I reviewed their postoperative timeline and plan with them. They understand the postoperative precautions and the treatment plan going forward.     Follow-Up: Patient will follow-up in 6-8 weeks.    At the end of the visit, all questions were answered in full. The patient is in agreement with the plan and recommendations. They will call the office with any questions/concerns.    Note dictated with [employer] software. Completed without full typed error editing and sent to avoid delay.    SUBJECTIVE  CHIEF COMPLAINT:   Chief Complaint   Patient presents with    Right Knee - Post-op     2wk POV No pain        HPI: Israel Franks is a 85 y.o. patient. Israel Franks is now 6 weeks and 4 days postop status post right quadriceps repair, overall doing well.  At this time, he has regained significant motion from 0 to 90 degrees.  His pain is controlled.  No reported issues with the surgical incisions.  No new injuries or falls.    REVIEW OF SYSTEMS  Constitutional: See HPI for pain assessment, No significant weight loss, recent trauma  Cardiovascular: No chest pain, shortness of breath  Respiratory: No difficulty breathing, cough  Gastrointestinal: No nausea, vomiting, diarrhea, constipation  Musculoskeletal: Noted in HPI, positive for pain, restricted motion, stiffness  Integumentary: No rashes, easy bruising, redness   Neurological: no numbness or tingling in extremities, no gait disturbances   Psychiatric: No mood changes, memory changes, social  issues  Heme/Lymph: no excessive swelling, easy bruising, excessive bleeding  ENT: No hearing changes  Eyes: No vision changes    CURRENT MEDICATIONS:   Current Outpatient Medications   Medication Sig Dispense Refill    acetaminophen (Tylenol) 325 mg tablet Take 2 tablets (650 mg) by mouth every 4 hours if needed for mild pain (1 - 3).      atorvastatin (Lipitor) 10 mg tablet Take 1 tablet (10 mg) by mouth once daily at bedtime.      calcium citrate/vitamin D3 (CITRACAL REGULAR ORAL) Take 3 tablets by mouth once daily at bedtime.      denosumab (Prolia) 60 mg/mL syringe Inject 1 mL (60 mg total) under the skin every 6 months. 1 mL 1    Edarbi 40 mg tablet TAKE 1 TABLET BY MOUTH DAILY 30 tablet 0    ferrous sulfate 325 (65 Fe) MG EC tablet Take 1 tablet by mouth once daily at noon. Take with meals.      levothyroxine (Synthroid, Levoxyl) 88 mcg tablet TAKE 1 TABLET BY MOUTH ONCE  DAILY 90 tablet 0    metoprolol succinate XL (Toprol-XL) 25 mg 24 hr tablet Take 1 tablet (25 mg) by mouth once daily. Do not crush or chew.      multivitamin with minerals tablet Take 1 tablet by mouth once daily at noon. Take with meals.      polyethylene glycol (Glycolax, Miralax) 17 gram packet Take 17 g by mouth once daily.      tamsulosin (Flomax) 0.4 mg 24 hr capsule TAKE 1 CAPSULE BY MOUTH ONCE DAILY. 90 capsule 0    vitamins A,C,E-zinc-copper (ICaps AREDS) 4,296 mcg-226 mg-90 mg capsule Take 1 capsule by mouth 2 times a day.       No current facility-administered medications for this visit.        OBJECTIVE    PHYSICAL EXAM      2/16/2025     9:43 AM 2/16/2025     5:04 PM 2/16/2025     7:43 PM 2/17/2025     3:17 AM 2/17/2025     8:11 AM 3/28/2025    10:15 AM 3/31/2025     2:11 PM   Vitals   Systolic 103 101 94 109 112  116   Diastolic 56 57 53 58 61  68   BP Location Left arm  Left arm Left arm Left arm  Left arm   Heart Rate  79 91 82 85  84   Temp  37 °C (98.6 °F) 36.8 °C (98.2 °F) 37.5 °C (99.5 °F) 37.3 °C (99.1 °F)  36.3 °C  "(97.3 °F)   Resp  18 18 18 18  16   Height      1.803 m (5' 11\")    Weight (lb)      119 115.41   BMI      16.6 kg/m2 16.1 kg/m2   BSA (m2)      1.64 m2 1.62 m2   Visit Report      Report       Body mass index is 16.6 kg/m².    General: Well-appearing, no acute distress    Skin intact bilateral upper and lower extremities  No erythema  No warmth    Detailed examination of right knee demonstrates:  Surgical incisions healing well.  No erythema or warmth  No drainage  No ecchymosis  No effusion  Resolving swelling, minimal  Knee range of motion: 0-90 degrees without pain.  Patella mobility 1+ medial and lateral  Lower extremity motor grossly intact  L4-S1 sensation intact bilaterally  2+ DP/PT pulses bilaterally  Warm and well-perfused, brisk capillary refill    IMAGING:   No evidence of acute fracture or interval injury.    Júnior Teixeira MD, MPH  Attending Surgeon    Sports Medicine Orthopaedic Surgery  [facility] [place]  [facility] [employer]   "

## 2025-04-11 ENCOUNTER — APPOINTMENT (OUTPATIENT)
Dept: ORTHOPEDIC SURGERY | Facility: HOSPITAL | Age: 85
End: 2025-04-11
Payer: COMMERCIAL

## 2025-04-15 ENCOUNTER — PATIENT OUTREACH (OUTPATIENT)
Dept: CARE COORDINATION | Facility: CLINIC | Age: 85
End: 2025-04-15
Payer: COMMERCIAL

## 2025-04-15 DIAGNOSIS — I10 BENIGN ESSENTIAL HYPERTENSION: ICD-10-CM

## 2025-04-15 RX ORDER — AZILSARTAN KAMEDOXOMIL 40 MG/1
1 TABLET ORAL DAILY
Qty: 90 TABLET | Refills: 0 | Status: SHIPPED | OUTPATIENT
Start: 2025-04-15

## 2025-04-22 ASSESSMENT — ENCOUNTER SYMPTOMS
NECK PAIN: 0
HEADACHES: 0
PND: 0
ORTHOPNEA: 0
SHORTNESS OF BREATH: 0
PALPITATIONS: 0
BLURRED VISION: 0
HYPERTENSION: 1

## 2025-04-24 ENCOUNTER — APPOINTMENT (OUTPATIENT)
Dept: PRIMARY CARE | Facility: CLINIC | Age: 85
End: 2025-04-24
Payer: COMMERCIAL

## 2025-04-24 VITALS
SYSTOLIC BLOOD PRESSURE: 120 MMHG | RESPIRATION RATE: 22 BRPM | DIASTOLIC BLOOD PRESSURE: 70 MMHG | HEART RATE: 82 BPM | BODY MASS INDEX: 16.1 KG/M2 | WEIGHT: 115 LBS | OXYGEN SATURATION: 98 % | HEIGHT: 71 IN

## 2025-04-24 DIAGNOSIS — E78.49 OTHER HYPERLIPIDEMIA: ICD-10-CM

## 2025-04-24 DIAGNOSIS — E78.5 DYSLIPIDEMIA: ICD-10-CM

## 2025-04-24 DIAGNOSIS — Z92.89 HOSPITALIZATION WITHIN LAST 30 DAYS: Primary | ICD-10-CM

## 2025-04-24 DIAGNOSIS — N40.1 BENIGN PROSTATIC HYPERPLASIA WITH URINARY FREQUENCY: ICD-10-CM

## 2025-04-24 DIAGNOSIS — R97.20 ABNORMAL PSA: ICD-10-CM

## 2025-04-24 DIAGNOSIS — R35.0 BENIGN PROSTATIC HYPERPLASIA WITH URINARY FREQUENCY: ICD-10-CM

## 2025-04-24 DIAGNOSIS — R60.0 BILATERAL LEG EDEMA: ICD-10-CM

## 2025-04-24 DIAGNOSIS — K86.89 PANCREATIC INSUFFICIENCY (HHS-HCC): ICD-10-CM

## 2025-04-24 DIAGNOSIS — D53.1 MEGALOBLASTIC ANEMIA: ICD-10-CM

## 2025-04-24 DIAGNOSIS — I49.8 SINUS ARRHYTHMIA: ICD-10-CM

## 2025-04-24 DIAGNOSIS — I10 ESSENTIAL HYPERTENSION: ICD-10-CM

## 2025-04-24 DIAGNOSIS — E03.9 ACQUIRED HYPOTHYROIDISM: ICD-10-CM

## 2025-04-24 PROBLEM — K28.9 GASTROJEJUNAL ULCER WITHOUT HEMORRHAGE OR PERFORATION: Status: RESOLVED | Noted: 2025-02-17 | Resolved: 2025-04-24

## 2025-04-24 PROBLEM — E43 SEVERE PROTEIN-CALORIE MALNUTRITION (GOMEZ: LESS THAN 60% OF STANDARD WEIGHT) (MULTI): Status: RESOLVED | Noted: 2025-02-17 | Resolved: 2025-04-24

## 2025-04-24 PROBLEM — R29.6 FALLING: Status: RESOLVED | Noted: 2025-02-17 | Resolved: 2025-04-24

## 2025-04-24 PROBLEM — H35.30 MACULAR DEGENERATION: Status: RESOLVED | Noted: 2025-02-17 | Resolved: 2025-04-24

## 2025-04-24 PROBLEM — K21.9 GASTROESOPHAGEAL REFLUX DISEASE WITHOUT ESOPHAGITIS: Status: RESOLVED | Noted: 2025-02-17 | Resolved: 2025-04-24

## 2025-04-24 PROBLEM — K92.9 DIGESTIVE SYSTEM DISORDER: Status: RESOLVED | Noted: 2025-02-17 | Resolved: 2025-04-24

## 2025-04-24 PROCEDURE — 99215 OFFICE O/P EST HI 40 MIN: CPT | Performed by: INTERNAL MEDICINE

## 2025-04-24 PROCEDURE — 3074F SYST BP LT 130 MM HG: CPT | Performed by: INTERNAL MEDICINE

## 2025-04-24 PROCEDURE — 1036F TOBACCO NON-USER: CPT | Performed by: INTERNAL MEDICINE

## 2025-04-24 PROCEDURE — 1159F MED LIST DOCD IN RCRD: CPT | Performed by: INTERNAL MEDICINE

## 2025-04-24 PROCEDURE — G2211 COMPLEX E/M VISIT ADD ON: HCPCS | Performed by: INTERNAL MEDICINE

## 2025-04-24 PROCEDURE — 3078F DIAST BP <80 MM HG: CPT | Performed by: INTERNAL MEDICINE

## 2025-04-24 PROCEDURE — 1123F ACP DISCUSS/DSCN MKR DOCD: CPT | Performed by: INTERNAL MEDICINE

## 2025-04-24 RX ORDER — ATORVASTATIN CALCIUM 10 MG/1
10 TABLET, FILM COATED ORAL NIGHTLY
Qty: 90 TABLET | Refills: 0 | Status: SHIPPED | OUTPATIENT
Start: 2025-04-24

## 2025-04-24 RX ORDER — LOSARTAN POTASSIUM 25 MG/1
TABLET ORAL EVERY 24 HOURS
COMMUNITY
Start: 2025-02-26

## 2025-04-24 ASSESSMENT — ENCOUNTER SYMPTOMS
CARDIOVASCULAR NEGATIVE: 1
HYPERTENSION: 1
PND: 0
BLURRED VISION: 0
SHORTNESS OF BREATH: 0
EYES NEGATIVE: 1
NEUROLOGICAL NEGATIVE: 1
HEMATOLOGIC/LYMPHATIC NEGATIVE: 1
PALPITATIONS: 0
ORTHOPNEA: 0
ALLERGIC/IMMUNOLOGIC NEGATIVE: 1
GASTROINTESTINAL NEGATIVE: 1
PSYCHIATRIC NEGATIVE: 1
NECK PAIN: 0
CONSTITUTIONAL NEGATIVE: 1
RESPIRATORY NEGATIVE: 1
MUSCULOSKELETAL NEGATIVE: 1
HEADACHES: 0
ENDOCRINE NEGATIVE: 1

## 2025-04-24 NOTE — ASSESSMENT & PLAN NOTE
HTN - hypertension well/controlled .Target BP < 130/80  achieved. Educate low salt diet and exercise with weight loss. Educate home self monitoring and diary keeping. Educate risks of elevate blood pressure and benefits of prompt treatment.  Refill Edarbi and Losartan and metoprolol

## 2025-04-24 NOTE — PROGRESS NOTES
"Subjective   Patient ID: Israel Franks is a 85 y.o. male who presents for Follow-up (fuv). Status Post hospitalization due to fall and Quads tendon tear and repair and long rehab     Hypertension  This is a chronic problem. The current episode started more than 1 year ago. The problem has been resolved since onset. The problem is controlled. Pertinent negatives include no anxiety, blurred vision, chest pain, headaches, malaise/fatigue, neck pain, orthopnea, palpitations, peripheral edema, PND or shortness of breath. There are no associated agents to hypertension. There are no known risk factors for coronary artery disease. There are no compliance problems.         Review of Systems   Constitutional: Negative.  Negative for malaise/fatigue.   HENT: Negative.     Eyes: Negative.  Negative for blurred vision.   Respiratory: Negative.  Negative for shortness of breath.    Cardiovascular: Negative.  Negative for chest pain, palpitations, orthopnea and PND.   Gastrointestinal: Negative.    Endocrine: Negative.    Musculoskeletal: Negative.  Negative for neck pain.   Skin: Negative.    Allergic/Immunologic: Negative.    Neurological: Negative.  Negative for headaches.   Hematological: Negative.    Psychiatric/Behavioral: Negative.     All other systems reviewed and are negative.      Objective   /70   Pulse 82   Resp 22   Ht 1.803 m (5' 11\")   Wt 52.2 kg (115 lb)   SpO2 98%   BMI 16.04 kg/m²     Physical Exam  Vitals and nursing note reviewed.   Constitutional:       Appearance: Normal appearance.   HENT:      Head: Normocephalic and atraumatic.      Right Ear: Tympanic membrane, ear canal and external ear normal.      Left Ear: Tympanic membrane, ear canal and external ear normal. There is no impacted cerumen.      Nose: Nose normal.      Mouth/Throat:      Mouth: Mucous membranes are moist.      Pharynx: Oropharynx is clear.   Eyes:      Extraocular Movements: Extraocular movements intact.      " Conjunctiva/sclera: Conjunctivae normal.      Pupils: Pupils are equal, round, and reactive to light.   Cardiovascular:      Rate and Rhythm: Normal rate and regular rhythm.      Pulses: Normal pulses.      Heart sounds: Normal heart sounds. No murmur heard.  Pulmonary:      Effort: Pulmonary effort is normal. No respiratory distress.      Breath sounds: Normal breath sounds. No stridor. No wheezing, rhonchi or rales.   Chest:      Chest wall: No tenderness.   Abdominal:      General: Abdomen is flat. Bowel sounds are normal. There is no distension.      Palpations: Abdomen is soft. There is no mass.      Tenderness: There is no abdominal tenderness. There is no right CVA tenderness, left CVA tenderness, guarding or rebound.      Hernia: No hernia is present.   Musculoskeletal:         General: Normal range of motion.      Cervical back: Normal range of motion and neck supple.   Skin:     General: Skin is warm.      Capillary Refill: Capillary refill takes less than 2 seconds.   Neurological:      General: No focal deficit present.      Mental Status: He is alert.      Cranial Nerves: No cranial nerve deficit.      Sensory: No sensory deficit.      Motor: No weakness.      Coordination: Coordination normal.      Gait: Gait normal.      Deep Tendon Reflexes: Reflexes normal.   Psychiatric:         Mood and Affect: Mood normal.         Behavior: Behavior normal. Behavior is cooperative.         Thought Content: Thought content normal.         Judgment: Judgment normal.         Assessment/Plan   Problem List Items Addressed This Visit           ICD-10-CM    Abnormal PSA R97.20    Relevant Orders    PSA    Benign prostatic hyperplasia with urinary frequency N40.1, R35.0    BPH - Benign PROSTATIC Hypertrophy, + Dysuria/Nocturia, check PSA, prescribe Flomax 0.4mg qD and Avodart 0.5 mg qD vs. Finasteride 5 mg qD.  Educate exercises and Change in life style, prescribe vitamin E 400 IU qD. Consider referral to urology.           Hypothyroidism E03.9    Hypothyroidism - Symptoms are controlled (weight gain, fatigue, constipation, cold intolerance). Check TSH continues dose of Synthroid/Levothyroxine of 88 mcg/qD.          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 Educate diet and change in lifestyle and increase in exercises - Refill: and educate compliance with medication and diet.          Pancreatic insufficiency ([facility]) K86.89    Supplement enzymes and monitor symptoms and appetitie and weight          Essential hypertension I10    HTN - hypertension well/controlled .Target BP < 130/80  achieved. Educate low salt diet and exercise with weight loss. Educate home self monitoring and diary keeping. Educate risks of elevate blood pressure and benefits of prompt treatment.  Refill Edarbi and Losartan and metoprolol          Hospitalization within last 30 days - Primary Z92.89    reviewed the hospital course and reconcile her medications

## 2025-04-24 NOTE — ASSESSMENT & PLAN NOTE
Edema - and leg swelling dur to venous insufficiency - responding to low dose Furosemide and recommend: leg elevation and compression stockings - v

## 2025-04-25 DIAGNOSIS — R35.0 FREQUENCY OF MICTURITION: ICD-10-CM

## 2025-04-25 DIAGNOSIS — N40.1 BENIGN PROSTATIC HYPERPLASIA WITH LOWER URINARY TRACT SYMPTOMS: ICD-10-CM

## 2025-04-25 RX ORDER — TAMSULOSIN HYDROCHLORIDE 0.4 MG/1
0.4 CAPSULE ORAL DAILY
Qty: 90 CAPSULE | Refills: 0 | Status: SHIPPED | OUTPATIENT
Start: 2025-04-25

## 2025-04-26 ENCOUNTER — OFFICE VISIT (OUTPATIENT)
Dept: URGENT CARE | Age: 85
End: 2025-04-26
Payer: COMMERCIAL

## 2025-04-26 VITALS
HEART RATE: 75 BPM | BODY MASS INDEX: 16.8 KG/M2 | OXYGEN SATURATION: 99 % | SYSTOLIC BLOOD PRESSURE: 132 MMHG | TEMPERATURE: 97.8 F | WEIGHT: 120 LBS | RESPIRATION RATE: 20 BRPM | DIASTOLIC BLOOD PRESSURE: 61 MMHG | HEIGHT: 71 IN

## 2025-04-26 DIAGNOSIS — R21 RASH OF UNKNOWN ETIOLOGY: Primary | ICD-10-CM

## 2025-04-26 LAB
BASOPHILS # BLD AUTO: 40 CELLS/UL (ref 0–200)
BASOPHILS NFR BLD AUTO: 0.6 %
EOSINOPHIL # BLD AUTO: 0 CELLS/UL (ref 15–500)
EOSINOPHIL NFR BLD AUTO: 0 %
ERYTHROCYTE [DISTWIDTH] IN BLOOD BY AUTOMATED COUNT: 12.1 % (ref 11–15)
HCT VFR BLD AUTO: 31.4 % (ref 38.5–50)
HGB BLD-MCNC: 10.2 G/DL (ref 13.2–17.1)
LYMPHOCYTES # BLD AUTO: 1142 CELLS/UL (ref 850–3900)
LYMPHOCYTES NFR BLD AUTO: 17.3 %
MCH RBC QN AUTO: 32 PG (ref 27–33)
MCHC RBC AUTO-ENTMCNC: 32.5 G/DL (ref 32–36)
MCV RBC AUTO: 98.4 FL (ref 80–100)
MONOCYTES # BLD AUTO: 752 CELLS/UL (ref 200–950)
MONOCYTES NFR BLD AUTO: 11.4 %
NEUTROPHILS # BLD AUTO: 4666 CELLS/UL (ref 1500–7800)
NEUTROPHILS NFR BLD AUTO: 70.7 %
PLATELET # BLD AUTO: 206 THOUSAND/UL (ref 140–400)
PMV BLD REES-ECKER: 10.7 FL (ref 7.5–12.5)
PSA SERPL-MCNC: 6.26 NG/ML
RBC # BLD AUTO: 3.19 MILLION/UL (ref 4.2–5.8)
TSH SERPL-ACNC: 3.28 MIU/L (ref 0.4–4.5)
WBC # BLD AUTO: 6.6 THOUSAND/UL (ref 3.8–10.8)

## 2025-04-26 RX ORDER — FAMOTIDINE 40 MG/1
40 TABLET, FILM COATED ORAL DAILY
Qty: 14 TABLET | Refills: 0 | Status: SHIPPED | OUTPATIENT
Start: 2025-04-26 | End: 2025-05-10

## 2025-04-26 RX ORDER — METHYLPREDNISOLONE 4 MG/1
TABLET ORAL
Qty: 21 TABLET | Refills: 0 | Status: SHIPPED | OUTPATIENT
Start: 2025-04-26 | End: 2025-05-02

## 2025-04-26 ASSESSMENT — ENCOUNTER SYMPTOMS
LOSS OF SENSATION IN FEET: 0
OCCASIONAL FEELINGS OF UNSTEADINESS: 0
DEPRESSION: 0

## 2025-04-26 NOTE — PROGRESS NOTES
"Subjective   Patient ID: Israel Franks is a 85 y.o. male. They present today with a chief complaint of Foot Infection (Rough skin, red, couple of months, itchy. RT foot. ).    History of Present Illness  Patient is an 85-year-old male who presents for concern of right foot infection.  States that his foot has been red and itchy for the past couple of months.  States that it started after a stay in the hospital several weeks ago.  States that he has just been ignoring it still has not had it evaluated yet.  States that it becomes more pruritic than what he touches it.  He denies any exposures to new soaps, lotions, detergents, pets, plants or medications.    Past Medical History  Allergies as of 04/26/2025    (No Known Allergies)       Prescriptions Prior to Admission[1]     Medical History[2]    Surgical History[3]     reports that he quit smoking about 62 years ago. His smoking use included cigarettes. He started smoking about 56 years ago. He has never used smokeless tobacco. He reports that he does not currently use alcohol after a past usage of about 3.0 standard drinks of alcohol per week. He reports that he does not use drugs.    Review of Systems  ROS is negative unless otherwise stated in HPI.         Objective    Vitals:    04/26/25 1546   BP: 132/61   Pulse: 75   Resp: 20   Temp: 36.6 °C (97.8 °F)   TempSrc: Oral   SpO2: 99%   Weight: 54.4 kg (120 lb)   Height: 1.803 m (5' 11\")     No LMP for male patient.      VS: As documented in the triage note and EMR flowsheet from this visit was reviewed  General: Well appearing. No acute distress.   Eyes:  Extraocular movements grossly intact. No scleral icterus.   Head: Atraumatic. Normocephalic.     Neck: No meningismus. No gross masses. Full movement through range of motion  CV: Regular rhythm. No murmurs, rubs, gallops appreciated.   Resp: Clear to auscultation bilaterally. No respiratory distress.    Skin: Warm, dry.  Erythematous rash characterized by very " small papules to the dorsum of the right foot that has a sandpaper like texture.  Neuro: CN II-VII intact. A&O x3. Speech fluent. Alert. Moving all extremities. Ambulates with normal gait  Psych: Appropriate mood and affect for situation      Point of Care Test & Imaging Results from this visit  No results found for this visit on 04/26/25.   Imaging  No results found.    Cardiology, Vascular, and Other Imaging  No other imaging results found for the past 2 days      Diagnostic study results (if any) were reviewed by Rosario Corley PA-C.    Assessment/Plan   Allergies, medications, history, and pertinent labs/EKGs/Imaging reviewed by Rosario Corley PA-C.     Medical Decision Making  Patient is an 85-year-old male who presents for rash to the right foot.  States that has been there for several weeks without improvement.  States that it is pruritic in nature.  On examination, he has an erythematous rash characterized by very small papules with a sandpaperlike texture.  Etiology of the rash is unclear at this time.  Will prescribe patient Medrol Dosepak and Pepcid for itching.  Advised to follow-up with dermatology for any persistent symptoms. Patient informed of the diagnosis.  They are agreeable to the plan as discussed above.  Patient given the opportunity to ask questions.  All of the patient's questions were answered. Given precautions in which to seek attention in the emergency department. Discussed follow up with PCP or other appropriate clinician.      Orders and Diagnoses  Diagnoses and all orders for this visit:  Rash of unknown etiology  -     famotidine (Pepcid) 40 mg tablet; Take 1 tablet (40 mg) by mouth once daily for 14 days.  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    Gastrojejunal ulcer, unspecified as acute or chronic, without hemorrhage or perforation     Jejunal ulcer    Gilbert syndrome     Gilbert syndrome    Hyperlipidemia     Hypertension     Iron deficiency anemia secondary to blood loss (chronic)     Iron deficiency anemia due to chronic blood loss    Iron deficiency anemia secondary to blood loss (chronic) 12/27/2016    Anemia due to blood loss    Macular degeneration 02/17/2025    Other long term (current) drug therapy 06/17/2016    High risk medication use    Personal history of (healed) traumatic fracture     History of fracture of left hip    Severe protein-calorie malnutrition (Erazo: less than 60% of standard weight) (Multi) 02/17/2025    Tinnitus, right ear 06/17/2016    Tinnitus of right ear   [3]   Past Surgical History:  Procedure Laterality Date    CHOLECYSTECTOMY  04/15/2014    Cholecystectomy    COLONOSCOPY  06/09/2015    Complete Colonoscopy    OTHER SURGICAL HISTORY  04/15/2014    Partial Gastrectomy Billroth II    US GUIDED ABSCESS DRAIN  7/6/2023    US GUIDED ABSCESS DRAIN 7/6/2023 [facility] US

## 2025-04-28 DIAGNOSIS — E78.5 DYSLIPIDEMIA: ICD-10-CM

## 2025-04-28 DIAGNOSIS — I49.8 SINUS ARRHYTHMIA: ICD-10-CM

## 2025-04-28 RX ORDER — ATORVASTATIN CALCIUM 10 MG/1
10 TABLET, FILM COATED ORAL NIGHTLY
Qty: 90 TABLET | Refills: 0 | Status: SHIPPED | OUTPATIENT
Start: 2025-04-28

## 2025-05-01 ENCOUNTER — CLINICAL SUPPORT (OUTPATIENT)
Dept: PRIMARY CARE | Facility: CLINIC | Age: 85
End: 2025-05-01
Payer: COMMERCIAL

## 2025-05-01 DIAGNOSIS — D53.1 MEGALOBLASTIC ANEMIA: ICD-10-CM

## 2025-05-01 PROCEDURE — 96372 THER/PROPH/DIAG INJ SC/IM: CPT | Performed by: INTERNAL MEDICINE

## 2025-05-01 RX ORDER — CYANOCOBALAMIN 1000 UG/ML
1000 INJECTION, SOLUTION INTRAMUSCULAR; SUBCUTANEOUS ONCE
Status: COMPLETED | OUTPATIENT
Start: 2025-05-01 | End: 2025-05-01

## 2025-05-01 RX ADMIN — CYANOCOBALAMIN 1000 MCG: 1000 INJECTION, SOLUTION INTRAMUSCULAR; SUBCUTANEOUS at 13:05

## 2025-05-02 ENCOUNTER — APPOINTMENT (OUTPATIENT)
Dept: ORTHOPEDIC SURGERY | Facility: CLINIC | Age: 85
End: 2025-05-02
Payer: COMMERCIAL

## 2025-05-03 DIAGNOSIS — E03.9 ACQUIRED HYPOTHYROIDISM: ICD-10-CM

## 2025-05-05 DIAGNOSIS — I10 BENIGN ESSENTIAL HYPERTENSION: ICD-10-CM

## 2025-05-05 RX ORDER — LEVOTHYROXINE SODIUM 88 UG/1
88 TABLET ORAL DAILY
Qty: 90 TABLET | Refills: 0 | Status: SHIPPED | OUTPATIENT
Start: 2025-05-05

## 2025-05-05 RX ORDER — AZILSARTAN KAMEDOXOMIL 40 MG/1
1 TABLET ORAL DAILY
Qty: 90 TABLET | Refills: 0 | Status: SHIPPED | OUTPATIENT
Start: 2025-05-05

## 2025-05-08 DIAGNOSIS — S76.111D QUADRICEPS TENDON RUPTURE, RIGHT, SUBSEQUENT ENCOUNTER: ICD-10-CM

## 2025-05-09 ENCOUNTER — OFFICE VISIT (OUTPATIENT)
Dept: ORTHOPEDIC SURGERY | Facility: HOSPITAL | Age: 85
End: 2025-05-09
Payer: COMMERCIAL

## 2025-05-09 ENCOUNTER — HOSPITAL ENCOUNTER (OUTPATIENT)
Dept: RADIOLOGY | Facility: HOSPITAL | Age: 85
Discharge: HOME | End: 2025-05-09
Payer: COMMERCIAL

## 2025-05-09 DIAGNOSIS — Z98.890 STATUS POST SURGERY: Primary | ICD-10-CM

## 2025-05-09 DIAGNOSIS — S76.111D QUADRICEPS TENDON RUPTURE, RIGHT, SUBSEQUENT ENCOUNTER: ICD-10-CM

## 2025-05-09 PROCEDURE — 73560 X-RAY EXAM OF KNEE 1 OR 2: CPT | Mod: RT

## 2025-05-09 PROCEDURE — 1159F MED LIST DOCD IN RCRD: CPT | Performed by: STUDENT IN AN ORGANIZED HEALTH CARE EDUCATION/TRAINING PROGRAM

## 2025-05-09 PROCEDURE — 99211 OFF/OP EST MAY X REQ PHY/QHP: CPT | Performed by: STUDENT IN AN ORGANIZED HEALTH CARE EDUCATION/TRAINING PROGRAM

## 2025-05-09 ASSESSMENT — PAIN - FUNCTIONAL ASSESSMENT: PAIN_FUNCTIONAL_ASSESSMENT: NO/DENIES PAIN

## 2025-05-13 NOTE — PROGRESS NOTES
FF   "redness   Neurological: no numbness or tingling in extremities, no gait disturbances   Psychiatric: No mood changes, memory changes, social issues  Heme/Lymph: no excessive swelling, easy bruising, excessive bleeding  ENT: No hearing changes  Eyes: No vision changes    CURRENT MEDICATIONS:   Current Medications[1]     OBJECTIVE    PHYSICAL EXAM      2/16/2025     7:43 PM 2/17/2025     3:17 AM 2/17/2025     8:11 AM 3/28/2025    10:15 AM 3/31/2025     2:11 PM 4/24/2025     3:00 PM 4/26/2025     3:46 PM   Vitals   Systolic 94 109 112  116 120 132   Diastolic 53 58 61  68 70 61   BP Location Left arm Left arm Left arm  Left arm     Heart Rate 91 82 85  84 82 75   Temp 36.8 °C (98.2 °F) 37.5 °C (99.5 °F) 37.3 °C (99.1 °F)  36.3 °C (97.3 °F)  36.6 °C (97.8 °F)   Resp 18 18 18  16 22 20   Height    1.803 m (5' 11\")  1.803 m (5' 11\") 1.803 m (5' 11\")   Weight (lb)    119 115.41 115 120   BMI    16.6 kg/m2 16.1 kg/m2 16.04 kg/m2 16.74 kg/m2   BSA (m2)    1.64 m2 1.62 m2 1.62 m2 1.65 m2   Visit Report    Report  Report Report      There is no height or weight on file to calculate BMI.    General: Well-appearing, no acute distress    Skin intact bilateral upper and lower extremities  No erythema  No warmth    Detailed examination of right knee demonstrates:  Surgical incisions healing well.  No erythema or warmth  No drainage  No ecchymosis  No effusion  No swelling.  Knee range of motion: 0-110 degrees without pain.  Patella mobility 1+ medial and lateral  Lower extremity motor grossly intact  L4-S1 sensation intact bilaterally  2+ DP/PT pulses bilaterally  Warm and well-perfused, brisk capillary refill     IMAGING:   No evidence of acute fracture or interval injury.    Júnior Teixeira MD, MPH  Attending Surgeon    Sports Medicine Orthopaedic Surgery  [facility] [employer] [facility]        [1]   Current Outpatient " Medications   Medication Sig Dispense Refill    levothyroxine (Synthroid, Levoxyl) 88 mcg tablet TAKE 1 TABLET BY MOUTH ONCE  DAILY 90 tablet 0    acetaminophen (Tylenol) 325 mg tablet Take 2 tablets (650 mg) by mouth every 4 hours if needed for mild pain (1 - 3).      atorvastatin (Lipitor) 10 mg tablet Take 1 tablet (10 mg) by mouth once daily at bedtime. 90 tablet 0    calcium citrate/vitamin D3 (CITRACAL REGULAR ORAL) Take 3 tablets by mouth once daily at bedtime.      denosumab (Prolia) 60 mg/mL syringe Inject 1 mL (60 mg total) under the skin every 6 months. 1 mL 1    Edarbi 40 mg tablet TAKE 1 TABLET BY MOUTH ONCE  DAILY 90 tablet 0    famotidine (Pepcid) 40 mg tablet Take 1 tablet (40 mg) by mouth once daily for 14 days. 14 tablet 0    ferrous sulfate 325 (65 Fe) MG EC tablet Take 1 tablet by mouth once daily at noon. Take with meals.      losartan (Cozaar) 25 mg tablet Take by mouth once every 24 hours.      metoprolol succinate XL (Toprol-XL) 25 mg 24 hr tablet Take 1 tablet (25 mg) by mouth once daily. Do not crush or chew.      multivitamin with minerals tablet Take 1 tablet by mouth once daily at noon. Take with meals.      polyethylene glycol (Glycolax, Miralax) 17 gram packet Take 17 g by mouth once daily.      tamsulosin (Flomax) 0.4 mg 24 hr capsule Take 1 capsule (0.4 mg) by mouth once daily. 90 capsule 0    vitamins A,C,E-zinc-copper (ICaps AREDS) 4,296 mcg-226 mg-90 mg capsule Take 1 capsule by mouth 2 times a day.       No current facility-administered medications for this visit.

## 2025-05-27 ENCOUNTER — APPOINTMENT (OUTPATIENT)
Dept: ORTHOPEDIC SURGERY | Facility: CLINIC | Age: 85
End: 2025-05-27
Payer: COMMERCIAL

## 2025-06-05 ENCOUNTER — APPOINTMENT (OUTPATIENT)
Dept: PRIMARY CARE | Facility: CLINIC | Age: 85
End: 2025-06-05
Payer: COMMERCIAL

## 2025-06-05 ENCOUNTER — CLINICAL SUPPORT (OUTPATIENT)
Dept: PRIMARY CARE | Facility: CLINIC | Age: 85
End: 2025-06-05
Payer: COMMERCIAL

## 2025-06-05 DIAGNOSIS — D53.1 MEGALOBLASTIC ANEMIA: ICD-10-CM

## 2025-06-05 PROCEDURE — 96372 THER/PROPH/DIAG INJ SC/IM: CPT | Performed by: INTERNAL MEDICINE

## 2025-06-05 RX ORDER — CYANOCOBALAMIN 1000 UG/ML
1000 INJECTION, SOLUTION INTRAMUSCULAR; SUBCUTANEOUS ONCE
Status: COMPLETED | OUTPATIENT
Start: 2025-06-05 | End: 2025-06-05

## 2025-06-05 RX ADMIN — CYANOCOBALAMIN 1000 MCG: 1000 INJECTION, SOLUTION INTRAMUSCULAR; SUBCUTANEOUS at 12:17

## 2025-07-03 ENCOUNTER — APPOINTMENT (OUTPATIENT)
Dept: PRIMARY CARE | Facility: CLINIC | Age: 85
End: 2025-07-03
Payer: COMMERCIAL

## 2025-07-03 DIAGNOSIS — D53.1 MEGALOBLASTIC ANEMIA: ICD-10-CM

## 2025-07-03 PROCEDURE — 96372 THER/PROPH/DIAG INJ SC/IM: CPT | Performed by: INTERNAL MEDICINE

## 2025-07-03 RX ORDER — CYANOCOBALAMIN 1000 UG/ML
1000 INJECTION, SOLUTION INTRAMUSCULAR; SUBCUTANEOUS ONCE
Status: COMPLETED | OUTPATIENT
Start: 2025-07-03 | End: 2025-07-03

## 2025-07-03 RX ADMIN — CYANOCOBALAMIN 1000 MCG: 1000 INJECTION, SOLUTION INTRAMUSCULAR; SUBCUTANEOUS at 16:23

## 2025-07-05 DIAGNOSIS — E03.9 ACQUIRED HYPOTHYROIDISM: ICD-10-CM

## 2025-07-08 RX ORDER — LEVOTHYROXINE SODIUM 88 UG/1
88 TABLET ORAL DAILY
Qty: 90 TABLET | Refills: 1 | Status: SHIPPED | OUTPATIENT
Start: 2025-07-08

## 2025-07-30 DIAGNOSIS — N40.1 BENIGN PROSTATIC HYPERPLASIA WITH LOWER URINARY TRACT SYMPTOMS: ICD-10-CM

## 2025-07-30 DIAGNOSIS — R35.0 FREQUENCY OF MICTURITION: ICD-10-CM

## 2025-07-30 RX ORDER — TAMSULOSIN HYDROCHLORIDE 0.4 MG/1
0.4 CAPSULE ORAL
Qty: 90 CAPSULE | Refills: 0 | Status: SHIPPED | OUTPATIENT
Start: 2025-07-30

## 2025-08-02 ENCOUNTER — OFFICE VISIT (OUTPATIENT)
Dept: URGENT CARE | Age: 85
End: 2025-08-02
Payer: COMMERCIAL

## 2025-08-02 VITALS
DIASTOLIC BLOOD PRESSURE: 60 MMHG | RESPIRATION RATE: 18 BRPM | HEIGHT: 70 IN | TEMPERATURE: 97.4 F | SYSTOLIC BLOOD PRESSURE: 109 MMHG | WEIGHT: 120 LBS | HEART RATE: 74 BPM | OXYGEN SATURATION: 99 % | BODY MASS INDEX: 17.18 KG/M2

## 2025-08-02 DIAGNOSIS — S16.1XXA STRAIN OF NECK MUSCLE, INITIAL ENCOUNTER: Primary | ICD-10-CM

## 2025-08-02 PROCEDURE — 1159F MED LIST DOCD IN RCRD: CPT | Performed by: REGISTERED NURSE

## 2025-08-02 PROCEDURE — 3074F SYST BP LT 130 MM HG: CPT | Performed by: REGISTERED NURSE

## 2025-08-02 PROCEDURE — 3078F DIAST BP <80 MM HG: CPT | Performed by: REGISTERED NURSE

## 2025-08-02 PROCEDURE — 99213 OFFICE O/P EST LOW 20 MIN: CPT | Performed by: REGISTERED NURSE

## 2025-08-02 RX ORDER — TIZANIDINE 2 MG/1
2 TABLET ORAL EVERY 6 HOURS PRN
Qty: 30 TABLET | Refills: 0 | Status: SHIPPED | OUTPATIENT
Start: 2025-08-02 | End: 2025-08-12

## 2025-08-02 NOTE — PROGRESS NOTES
"Subjective   Patient ID: Israel Franks is a 85 y.o. male. They present today with a chief complaint of Neck Pain.    History of Present Illness  HPI  85-year-old male patient presents with chief complaint of left sided neck muscle pain.  He states about 10 days ago he was doing some overhead exercising where he was holding onto a bar above his head and pulling down to the back of his neck.  He states the pain has gradually gotten worse over the last few days and now is at the point where his neck is stiff and he is having difficulty with turning the head to the left.  He states he has been taking an occasional Tylenol which has helped mildly.  He denies any dizziness, headache, or lightheadedness.    Past Medical History  Allergies as of 08/02/2025    (No Known Allergies)       Prescriptions Prior to Admission[1]     Medical History[2]    Surgical History[3]     reports that he quit smoking about 62 years ago. His smoking use included cigarettes. He started smoking about 56 years ago. He has never used smokeless tobacco. He reports that he does not currently use alcohol after a past usage of about 3.0 standard drinks of alcohol per week. He reports that he does not use drugs.    Review of Systems  Review of Systems     Pertinent systems reviewed and were negative unless otherwise stated in HPI.                            Objective    Vitals:    08/02/25 1649   BP: 109/60   Pulse: 74   Resp: 18   Temp: 36.3 °C (97.4 °F)   TempSrc: Oral   SpO2: 99%   Weight: 54.4 kg (120 lb)   Height: 1.778 m (5' 10\")     No LMP for male patient.    Physical Exam  VS: As documented in the triage note and EMR flowsheet from this visit was reviewed  General: Well appearing. No acute distress.  Eyes:  Extraocular movements grossly intact. No scleral icterus.  Head: Atraumatic. Normocephalic.    Neck:  No gross masses.  +stiffness with tightness to the left side of neck, difficulty turning head side to side.  Mild tenderness with " palpation  CV: Regular rhythm. No murmurs, rubs, gallops appreciated.  Resp: Clear to auscultation bilaterally. No respiratory distress.    Skin: Warm, dry. No rashes  Neuro: CN II-VII intact. A&O x3. Speech fluent. Alert. Moving all extremities. Ambulates with normal gait  Psych: Appropriate mood and affect for situation    Procedures    Point of Care Test & Imaging Results from this visit  No results found for this visit on 08/02/25.   Imaging  No results found.    Cardiology, Vascular, and Other Imaging  No other imaging results found for the past 2 days      Diagnostic study results (if any) were reviewed by Crystal L Severino, APRN-CNP.    Assessment/Plan   Allergies, medications, history, and pertinent labs/EKGs/Imaging reviewed by Crystal L Severino, APRN-CNP.     Medical Decision Making  The patient was evaluated for above complaints in [facility]. The patient has muscle strain of neck muscle . Patient educated on treatment plan consisting of zanaflex as needed along with tylenol and ibuprofen. Patient provided with return precaution and can follow up with PCP if no improvement. They were provided with a work/school excuse if requested.      Orders and Diagnoses  Diagnoses and all orders for this visit:  Strain of neck muscle, initial encounter  -     tiZANidine (Zanaflex) 2 mg tablet; Take 1 tablet (2 mg) by mouth every 6 hours if needed for muscle spasms for up to 10 days.  Warm or cool compresses  OTC muscle rubs as needed  Tylenol/Ibuprofen as needed       Medical Admin Record      Patient disposition: Home    Electronically signed by Crystal L Severino, APRN-CNP  5:17 PM           [1] (Not in a hospital admission)  [2]   Past Medical History:  Diagnosis Date    Digestive system disorder 02/17/2025    Epigastric pain 12/28/2016    Abdominal pain, epigastric    Falling 02/17/2025    Finger injury 06/12/2024    Gastroesophageal reflux disease without esophagitis 02/17/2025    Gastrojejunal ulcer without  hemorrhage or perforation 02/17/2025    Gastrojejunal ulcer, unspecified as acute or chronic, without hemorrhage or perforation     Jejunal ulcer    Gilbert syndrome     Gilbert syndrome    Hyperlipidemia     Hypertension     Iron deficiency anemia secondary to blood loss (chronic)     Iron deficiency anemia due to chronic blood loss    Iron deficiency anemia secondary to blood loss (chronic) 12/27/2016    Anemia due to blood loss    Macular degeneration 02/17/2025    Other long term (current) drug therapy 06/17/2016    High risk medication use    Personal history of (healed) traumatic fracture     History of fracture of left hip    Severe protein-calorie malnutrition (Erazo: less than 60% of standard weight) (Multi) 02/17/2025    Tinnitus, right ear 06/17/2016    Tinnitus of right ear   [3]   Past Surgical History:  Procedure Laterality Date    CHOLECYSTECTOMY  04/15/2014    Cholecystectomy    COLONOSCOPY  06/09/2015    Complete Colonoscopy    OTHER SURGICAL HISTORY  04/15/2014    Partial Gastrectomy Billroth II    US GUIDED ABSCESS DRAIN  7/6/2023    US GUIDED ABSCESS DRAIN 7/6/2023 [facility] US

## 2025-08-07 ENCOUNTER — APPOINTMENT (OUTPATIENT)
Dept: PRIMARY CARE | Facility: CLINIC | Age: 85
End: 2025-08-07
Payer: COMMERCIAL

## 2025-08-07 DIAGNOSIS — D53.1 MEGALOBLASTIC ANEMIA: Primary | ICD-10-CM

## 2025-08-07 PROCEDURE — 96372 THER/PROPH/DIAG INJ SC/IM: CPT | Performed by: INTERNAL MEDICINE

## 2025-08-07 RX ORDER — CYANOCOBALAMIN 1000 UG/ML
1000 INJECTION, SOLUTION INTRAMUSCULAR; SUBCUTANEOUS ONCE
Status: COMPLETED | OUTPATIENT
Start: 2025-08-07 | End: 2025-08-07

## 2025-08-07 RX ADMIN — CYANOCOBALAMIN 1000 MCG: 1000 INJECTION, SOLUTION INTRAMUSCULAR; SUBCUTANEOUS at 13:43

## 2025-08-08 ENCOUNTER — APPOINTMENT (OUTPATIENT)
Dept: ORTHOPEDIC SURGERY | Facility: HOSPITAL | Age: 85
End: 2025-08-08
Payer: COMMERCIAL

## 2025-08-20 DIAGNOSIS — I49.8 SINUS ARRHYTHMIA: ICD-10-CM

## 2025-08-20 DIAGNOSIS — E78.5 DYSLIPIDEMIA: ICD-10-CM

## 2025-08-21 DIAGNOSIS — Z98.890 STATUS POST SURGERY: ICD-10-CM

## 2025-08-21 DIAGNOSIS — S76.111A QUADRICEPS TENDON RUPTURE, RIGHT, INITIAL ENCOUNTER: ICD-10-CM

## 2025-08-22 ENCOUNTER — OFFICE VISIT (OUTPATIENT)
Dept: ORTHOPEDIC SURGERY | Facility: HOSPITAL | Age: 85
End: 2025-08-22
Payer: MEDICARE

## 2025-08-22 ENCOUNTER — HOSPITAL ENCOUNTER (OUTPATIENT)
Dept: RADIOLOGY | Facility: HOSPITAL | Age: 85
Discharge: HOME | End: 2025-08-22
Payer: MEDICARE

## 2025-08-22 DIAGNOSIS — Z98.890 STATUS POST SURGERY: ICD-10-CM

## 2025-08-22 DIAGNOSIS — S76.111A QUADRICEPS TENDON RUPTURE, RIGHT, INITIAL ENCOUNTER: ICD-10-CM

## 2025-08-22 PROCEDURE — 73560 X-RAY EXAM OF KNEE 1 OR 2: CPT | Mod: RT

## 2025-08-22 RX ORDER — ATORVASTATIN CALCIUM 10 MG/1
10 TABLET, FILM COATED ORAL NIGHTLY
Qty: 90 TABLET | Refills: 0 | Status: SHIPPED | OUTPATIENT
Start: 2025-08-22

## 2025-08-22 ASSESSMENT — PAIN - FUNCTIONAL ASSESSMENT: PAIN_FUNCTIONAL_ASSESSMENT: NO/DENIES PAIN

## 2025-09-04 ENCOUNTER — APPOINTMENT (OUTPATIENT)
Dept: PRIMARY CARE | Facility: CLINIC | Age: 85
End: 2025-09-04
Payer: COMMERCIAL

## 2025-09-04 DIAGNOSIS — M81.0 OSTEOPOROSIS, UNSPECIFIED OSTEOPOROSIS TYPE, UNSPECIFIED PATHOLOGICAL FRACTURE PRESENCE: Primary | ICD-10-CM

## 2025-09-04 RX ORDER — EPINEPHRINE 0.3 MG/.3ML
0.3 INJECTION SUBCUTANEOUS EVERY 5 MIN PRN
OUTPATIENT
Start: 2025-09-04

## 2025-09-04 RX ORDER — DIPHENHYDRAMINE HYDROCHLORIDE 50 MG/ML
50 INJECTION, SOLUTION INTRAMUSCULAR; INTRAVENOUS AS NEEDED
OUTPATIENT
Start: 2025-09-04

## 2025-09-04 RX ORDER — ALBUTEROL SULFATE 0.83 MG/ML
3 SOLUTION RESPIRATORY (INHALATION) AS NEEDED
OUTPATIENT
Start: 2025-09-22

## 2025-09-04 RX ORDER — FAMOTIDINE 10 MG/ML
20 INJECTION, SOLUTION INTRAVENOUS ONCE AS NEEDED
OUTPATIENT
Start: 2025-09-04

## 2025-09-30 ENCOUNTER — APPOINTMENT (OUTPATIENT)
Dept: INFUSION THERAPY | Facility: CLINIC | Age: 85
End: 2025-09-30
Payer: COMMERCIAL

## 2025-10-09 ENCOUNTER — APPOINTMENT (OUTPATIENT)
Dept: PRIMARY CARE | Facility: CLINIC | Age: 85
End: 2025-10-09
Payer: MEDICARE

## 2025-10-21 ENCOUNTER — APPOINTMENT (OUTPATIENT)
Dept: UROLOGY | Facility: CLINIC | Age: 85
End: 2025-10-21
Payer: MEDICARE

## (undated) DEVICE — SLEEVE, VASO PRESS, CALF GARMENT, MEDIUM, GREEN

## (undated) DEVICE — SPONGE, LAP, XRAY DECT, 18IN X 18IN, W/LOOP, STERILE

## (undated) DEVICE — VESSEL LOOP, RED MAXI, 2 CARD

## (undated) DEVICE — CATHETER, IV, ANGIOCATH, 14 G X 1.88 IN, FEP POLYMER

## (undated) DEVICE — PITCHER, GRADUATE, 32 OZ (1200CC), STERILE

## (undated) DEVICE — APPLICATOR, CHLORAPREP, W/ORANGE TINT, 26ML

## (undated) DEVICE — DRAPE, U-DRAPE, NON STERILE

## (undated) DEVICE — DRAPE, SHEET, U, W/ADHESIVE STRIP, IMPERVIOUS, 60 X 70 IN, DISPOSABLE, LF, STERILE

## (undated) DEVICE — PADDING, CAST, WYTEX, 4 IN X 4 YD, LF

## (undated) DEVICE — Device

## (undated) DEVICE — DRAPE, INCISE, ANTIMICROBIAL, IOBAN 2, LARGE, 17 X 23 IN, DISPOSABLE, STERILE

## (undated) DEVICE — GUIDE PIN KIT, ACL W/O SAW BLADE

## (undated) DEVICE — DRAPE, SHEET, THREE QUARTER, FAN FOLD, 57 X 77 IN

## (undated) DEVICE — DRESSING, MEPILEX BORDER, POST-OP AG, 4 X 12 IN

## (undated) DEVICE — BAG, BANDED, 36IN X 28IN

## (undated) DEVICE — CUFF, TOURNIQUET, 24 X 4, DUAL PORT/SNGL BLADDER, DISP, LF

## (undated) DEVICE — BANDAGE, ESMARK, 6 IN X 9 FT, STERILE

## (undated) DEVICE — DRESSING, GAUZE, SPONGE, 12 PLY, CURITY, 4 X 4 IN, RIGID TRAY, STERILE

## (undated) DEVICE — BANDAGE, ELASTIC, PREMIUM, SELF-CLOSE, 6 IN X 5.5 YD, STERILE

## (undated) DEVICE — BANDAGE, ELASTIC, ACE, ACE, DOUBLE LENGTH, 6 X 550 IN, LF

## (undated) DEVICE — BANDAGE, COFLEX, 6 X 5 YDS, FOAM TAN, STERILE, LF

## (undated) DEVICE — BANDAGE, ELASTIC, PREMIUM, SELF-CLOSE, 4 IN X 5.5 YD, STERILE